# Patient Record
Sex: MALE | Race: WHITE | NOT HISPANIC OR LATINO | Employment: OTHER | URBAN - METROPOLITAN AREA
[De-identification: names, ages, dates, MRNs, and addresses within clinical notes are randomized per-mention and may not be internally consistent; named-entity substitution may affect disease eponyms.]

---

## 2017-06-29 ENCOUNTER — HOSPITAL ENCOUNTER (INPATIENT)
Facility: HOSPITAL | Age: 52
LOS: 4 days | Discharge: HOME | DRG: 871 | End: 2017-07-03
Attending: EMERGENCY MEDICINE | Admitting: HOSPITALIST
Payer: MEDICARE

## 2017-06-29 ENCOUNTER — GENERIC CONVERSION - ENCOUNTER (OUTPATIENT)
Dept: OTHER | Facility: OTHER | Age: 52
End: 2017-06-29

## 2017-06-29 ENCOUNTER — APPOINTMENT (EMERGENCY)
Dept: RADIOLOGY | Facility: HOSPITAL | Age: 52
DRG: 871 | End: 2017-06-29
Payer: MEDICARE

## 2017-06-29 DIAGNOSIS — N39.0 URINARY TRACT INFECTION, ACUTE: Primary | ICD-10-CM

## 2017-06-29 DIAGNOSIS — E66.01 MORBID OBESITY DUE TO EXCESS CALORIES (HCC): Chronic | ICD-10-CM

## 2017-06-29 DIAGNOSIS — A41.9 SEPSIS (HCC): ICD-10-CM

## 2017-06-29 DIAGNOSIS — I50.9 CHF, ACUTE (HCC): ICD-10-CM

## 2017-06-29 DIAGNOSIS — I50.43 ACUTE ON CHRONIC COMBINED SYSTOLIC AND DIASTOLIC CONGESTIVE HEART FAILURE (HCC): Chronic | ICD-10-CM

## 2017-06-29 PROBLEM — I10 ESSENTIAL HYPERTENSION: Chronic | Status: ACTIVE | Noted: 2017-06-29

## 2017-06-29 PROBLEM — R65.10 SIRS (SYSTEMIC INFLAMMATORY RESPONSE SYNDROME) (HCC): Status: ACTIVE | Noted: 2017-06-29

## 2017-06-29 PROBLEM — N17.9 AKI (ACUTE KIDNEY INJURY) (HCC): Status: ACTIVE | Noted: 2017-06-29

## 2017-06-29 LAB
ALBUMIN SERPL BCP-MCNC: 3.4 G/DL (ref 3.5–5)
ALP SERPL-CCNC: 80 U/L (ref 46–116)
ALT SERPL W P-5'-P-CCNC: 49 U/L (ref 12–78)
ANION GAP SERPL CALCULATED.3IONS-SCNC: 8 MMOL/L (ref 4–13)
AST SERPL W P-5'-P-CCNC: 27 U/L (ref 5–45)
BACTERIA UR QL AUTO: ABNORMAL /HPF
BASOPHILS # BLD AUTO: 0.07 THOUSANDS/ΜL (ref 0–0.1)
BASOPHILS NFR BLD AUTO: 1 % (ref 0–1)
BILIRUB SERPL-MCNC: 0.8 MG/DL (ref 0.2–1)
BILIRUB UR QL STRIP: NEGATIVE
BUN SERPL-MCNC: 15 MG/DL (ref 5–25)
CALCIUM SERPL-MCNC: 9.4 MG/DL (ref 8.3–10.1)
CHLORIDE SERPL-SCNC: 99 MMOL/L (ref 100–108)
CLARITY UR: CLEAR
CO2 SERPL-SCNC: 31 MMOL/L (ref 21–32)
COLOR UR: YELLOW
CREAT SERPL-MCNC: 1.41 MG/DL (ref 0.6–1.3)
EOSINOPHIL # BLD AUTO: 0.14 THOUSAND/ΜL (ref 0–0.61)
EOSINOPHIL NFR BLD AUTO: 1 % (ref 0–6)
ERYTHROCYTE [DISTWIDTH] IN BLOOD BY AUTOMATED COUNT: 13.6 % (ref 11.6–15.1)
GFR SERPL CREATININE-BSD FRML MDRD: 53 ML/MIN/1.73SQ M
GLUCOSE SERPL-MCNC: 102 MG/DL (ref 65–140)
GLUCOSE UR STRIP-MCNC: NEGATIVE MG/DL
HCT VFR BLD AUTO: 46.2 % (ref 36.5–49.3)
HGB BLD-MCNC: 15.3 G/DL (ref 12–17)
HGB UR QL STRIP.AUTO: ABNORMAL
KETONES UR STRIP-MCNC: NEGATIVE MG/DL
LACTATE SERPL-SCNC: 1.5 MMOL/L (ref 0.5–2)
LEUKOCYTE ESTERASE UR QL STRIP: ABNORMAL
LYMPHOCYTES # BLD AUTO: 1.82 THOUSANDS/ΜL (ref 0.6–4.47)
LYMPHOCYTES NFR BLD AUTO: 14 % (ref 14–44)
MCH RBC QN AUTO: 28.7 PG (ref 26.8–34.3)
MCHC RBC AUTO-ENTMCNC: 33.1 G/DL (ref 31.4–37.4)
MCV RBC AUTO: 87 FL (ref 82–98)
MONOCYTES # BLD AUTO: 1.1 THOUSAND/ΜL (ref 0.17–1.22)
MONOCYTES NFR BLD AUTO: 8 % (ref 4–12)
NEUTROPHILS # BLD AUTO: 10.37 THOUSANDS/ΜL (ref 1.85–7.62)
NEUTS SEG NFR BLD AUTO: 76 % (ref 43–75)
NITRITE UR QL STRIP: NEGATIVE
NON-SQ EPI CELLS URNS QL MICRO: ABNORMAL /HPF
NT-PROBNP SERPL-MCNC: 1748 PG/ML
PH UR STRIP.AUTO: 6 [PH] (ref 4.5–8)
PLATELET # BLD AUTO: 209 THOUSANDS/UL (ref 149–390)
PLATELET # BLD AUTO: 211 THOUSANDS/UL (ref 149–390)
PMV BLD AUTO: 11 FL (ref 8.9–12.7)
PMV BLD AUTO: 11.1 FL (ref 8.9–12.7)
POTASSIUM SERPL-SCNC: 3.9 MMOL/L (ref 3.5–5.3)
PROT SERPL-MCNC: 8.5 G/DL (ref 6.4–8.2)
PROT UR STRIP-MCNC: ABNORMAL MG/DL
RBC # BLD AUTO: 5.33 MILLION/UL (ref 3.88–5.62)
RBC #/AREA URNS AUTO: ABNORMAL /HPF
SODIUM SERPL-SCNC: 138 MMOL/L (ref 136–145)
SP GR UR STRIP.AUTO: 1.02 (ref 1–1.03)
TROPONIN I SERPL-MCNC: 0.06 NG/ML
UROBILINOGEN UR QL STRIP.AUTO: 0.2 E.U./DL
WBC # BLD AUTO: 13.5 THOUSAND/UL (ref 4.31–10.16)
WBC #/AREA URNS AUTO: ABNORMAL /HPF

## 2017-06-29 PROCEDURE — 94660 CPAP INITIATION&MGMT: CPT

## 2017-06-29 PROCEDURE — 85025 COMPLETE CBC W/AUTO DIFF WBC: CPT | Performed by: EMERGENCY MEDICINE

## 2017-06-29 PROCEDURE — 83605 ASSAY OF LACTIC ACID: CPT | Performed by: EMERGENCY MEDICINE

## 2017-06-29 PROCEDURE — 36415 COLL VENOUS BLD VENIPUNCTURE: CPT | Performed by: EMERGENCY MEDICINE

## 2017-06-29 PROCEDURE — 71020 HB CHEST X-RAY 2VW FRONTAL&LATL: CPT

## 2017-06-29 PROCEDURE — 99285 EMERGENCY DEPT VISIT HI MDM: CPT

## 2017-06-29 PROCEDURE — 80053 COMPREHEN METABOLIC PANEL: CPT | Performed by: EMERGENCY MEDICINE

## 2017-06-29 PROCEDURE — 87186 SC STD MICRODIL/AGAR DIL: CPT | Performed by: EMERGENCY MEDICINE

## 2017-06-29 PROCEDURE — 94760 N-INVAS EAR/PLS OXIMETRY 1: CPT

## 2017-06-29 PROCEDURE — 85049 AUTOMATED PLATELET COUNT: CPT | Performed by: PHYSICIAN ASSISTANT

## 2017-06-29 PROCEDURE — 84484 ASSAY OF TROPONIN QUANT: CPT | Performed by: EMERGENCY MEDICINE

## 2017-06-29 PROCEDURE — 93005 ELECTROCARDIOGRAM TRACING: CPT

## 2017-06-29 PROCEDURE — 87086 URINE CULTURE/COLONY COUNT: CPT | Performed by: EMERGENCY MEDICINE

## 2017-06-29 PROCEDURE — 83880 ASSAY OF NATRIURETIC PEPTIDE: CPT | Performed by: EMERGENCY MEDICINE

## 2017-06-29 PROCEDURE — 81001 URINALYSIS AUTO W/SCOPE: CPT | Performed by: EMERGENCY MEDICINE

## 2017-06-29 PROCEDURE — 84484 ASSAY OF TROPONIN QUANT: CPT | Performed by: PHYSICIAN ASSISTANT

## 2017-06-29 PROCEDURE — 87040 BLOOD CULTURE FOR BACTERIA: CPT | Performed by: EMERGENCY MEDICINE

## 2017-06-29 PROCEDURE — 87077 CULTURE AEROBIC IDENTIFY: CPT | Performed by: EMERGENCY MEDICINE

## 2017-06-29 RX ORDER — ALBUTEROL SULFATE 2.5 MG/3ML
2.5 SOLUTION RESPIRATORY (INHALATION) EVERY 4 HOURS PRN
Status: DISCONTINUED | OUTPATIENT
Start: 2017-06-29 | End: 2017-07-03 | Stop reason: HOSPADM

## 2017-06-29 RX ORDER — ACETAMINOPHEN 325 MG/1
650 TABLET ORAL ONCE
Status: COMPLETED | OUTPATIENT
Start: 2017-06-29 | End: 2017-06-29

## 2017-06-29 RX ORDER — ONDANSETRON 2 MG/ML
4 INJECTION INTRAMUSCULAR; INTRAVENOUS EVERY 6 HOURS PRN
Status: DISCONTINUED | OUTPATIENT
Start: 2017-06-29 | End: 2017-07-03 | Stop reason: HOSPADM

## 2017-06-29 RX ORDER — HEPARIN SODIUM 5000 [USP'U]/ML
7500 INJECTION, SOLUTION INTRAVENOUS; SUBCUTANEOUS EVERY 8 HOURS SCHEDULED
Status: DISCONTINUED | OUTPATIENT
Start: 2017-06-29 | End: 2017-07-03 | Stop reason: HOSPADM

## 2017-06-29 RX ORDER — LEVOFLOXACIN 5 MG/ML
750 INJECTION, SOLUTION INTRAVENOUS ONCE
Status: COMPLETED | OUTPATIENT
Start: 2017-06-29 | End: 2017-06-29

## 2017-06-29 RX ORDER — FUROSEMIDE 10 MG/ML
40 INJECTION INTRAMUSCULAR; INTRAVENOUS
Status: DISCONTINUED | OUTPATIENT
Start: 2017-06-29 | End: 2017-07-02

## 2017-06-29 RX ORDER — ASPIRIN 81 MG/1
81 TABLET, CHEWABLE ORAL DAILY
Status: DISCONTINUED | OUTPATIENT
Start: 2017-06-30 | End: 2017-07-03 | Stop reason: HOSPADM

## 2017-06-29 RX ADMIN — ONDANSETRON 4 MG: 2 INJECTION INTRAMUSCULAR; INTRAVENOUS at 21:17

## 2017-06-29 RX ADMIN — METOPROLOL TARTRATE 12.5 MG: 25 TABLET ORAL at 20:33

## 2017-06-29 RX ADMIN — LEVOFLOXACIN 750 MG: 5 INJECTION, SOLUTION INTRAVENOUS at 15:21

## 2017-06-29 RX ADMIN — HEPARIN SODIUM 7500 UNITS: 5000 INJECTION, SOLUTION INTRAVENOUS; SUBCUTANEOUS at 18:20

## 2017-06-29 RX ADMIN — HEPARIN SODIUM 7500 UNITS: 5000 INJECTION, SOLUTION INTRAVENOUS; SUBCUTANEOUS at 21:17

## 2017-06-29 RX ADMIN — CEFEPIME HYDROCHLORIDE 2000 MG: 2 INJECTION, POWDER, FOR SOLUTION INTRAVENOUS at 20:34

## 2017-06-29 RX ADMIN — ACETAMINOPHEN 650 MG: 325 TABLET ORAL at 15:29

## 2017-06-29 RX ADMIN — FUROSEMIDE 40 MG: 10 INJECTION, SOLUTION INTRAMUSCULAR; INTRAVENOUS at 18:20

## 2017-06-30 ENCOUNTER — APPOINTMENT (INPATIENT)
Dept: NON INVASIVE DIAGNOSTICS | Facility: HOSPITAL | Age: 52
DRG: 871 | End: 2017-06-30
Payer: MEDICARE

## 2017-06-30 LAB
ANION GAP SERPL CALCULATED.3IONS-SCNC: 10 MMOL/L (ref 4–13)
ATRIAL RATE: 117 BPM
BASOPHILS # BLD AUTO: 0.05 THOUSANDS/ΜL (ref 0–0.1)
BASOPHILS NFR BLD AUTO: 1 % (ref 0–1)
BUN SERPL-MCNC: 18 MG/DL (ref 5–25)
CALCIUM SERPL-MCNC: 8.9 MG/DL (ref 8.3–10.1)
CHLORIDE SERPL-SCNC: 102 MMOL/L (ref 100–108)
CHOLEST SERPL-MCNC: 163 MG/DL (ref 50–200)
CO2 SERPL-SCNC: 26 MMOL/L (ref 21–32)
CREAT SERPL-MCNC: 1.47 MG/DL (ref 0.6–1.3)
EOSINOPHIL # BLD AUTO: 0.2 THOUSAND/ΜL (ref 0–0.61)
EOSINOPHIL NFR BLD AUTO: 2 % (ref 0–6)
ERYTHROCYTE [DISTWIDTH] IN BLOOD BY AUTOMATED COUNT: 13.8 % (ref 11.6–15.1)
EST. AVERAGE GLUCOSE BLD GHB EST-MCNC: 105 MG/DL
GFR SERPL CREATININE-BSD FRML MDRD: 50.5 ML/MIN/1.73SQ M
GLUCOSE SERPL-MCNC: 101 MG/DL (ref 65–140)
HBA1C MFR BLD: 5.3 % (ref 4.2–6.3)
HCT VFR BLD AUTO: 46.1 % (ref 36.5–49.3)
HDLC SERPL-MCNC: 43 MG/DL (ref 40–60)
HGB BLD-MCNC: 14.9 G/DL (ref 12–17)
LDLC SERPL CALC-MCNC: 103 MG/DL (ref 0–100)
LYMPHOCYTES # BLD AUTO: 1.56 THOUSANDS/ΜL (ref 0.6–4.47)
LYMPHOCYTES NFR BLD AUTO: 15 % (ref 14–44)
MCH RBC QN AUTO: 28.2 PG (ref 26.8–34.3)
MCHC RBC AUTO-ENTMCNC: 32.3 G/DL (ref 31.4–37.4)
MCV RBC AUTO: 87 FL (ref 82–98)
MONOCYTES # BLD AUTO: 0.93 THOUSAND/ΜL (ref 0.17–1.22)
MONOCYTES NFR BLD AUTO: 9 % (ref 4–12)
NEUTROPHILS # BLD AUTO: 7.72 THOUSANDS/ΜL (ref 1.85–7.62)
NEUTS SEG NFR BLD AUTO: 73 % (ref 43–75)
P AXIS: 43 DEGREES
PLATELET # BLD AUTO: 233 THOUSANDS/UL (ref 149–390)
PMV BLD AUTO: 11 FL (ref 8.9–12.7)
POTASSIUM SERPL-SCNC: 3.8 MMOL/L (ref 3.5–5.3)
PR INTERVAL: 162 MS
QRS AXIS: -6 DEGREES
QRSD INTERVAL: 106 MS
QT INTERVAL: 322 MS
QTC INTERVAL: 449 MS
RBC # BLD AUTO: 5.29 MILLION/UL (ref 3.88–5.62)
SODIUM SERPL-SCNC: 138 MMOL/L (ref 136–145)
T WAVE AXIS: 91 DEGREES
TRIGL SERPL-MCNC: 87 MG/DL
TROPONIN I SERPL-MCNC: 0.05 NG/ML
TSH SERPL DL<=0.05 MIU/L-ACNC: 0.84 UIU/ML (ref 0.36–3.74)
VENTRICULAR RATE: 117 BPM
WBC # BLD AUTO: 10.46 THOUSAND/UL (ref 4.31–10.16)

## 2017-06-30 PROCEDURE — 84443 ASSAY THYROID STIM HORMONE: CPT | Performed by: PHYSICIAN ASSISTANT

## 2017-06-30 PROCEDURE — C8929 TTE W OR WO FOL WCON,DOPPLER: HCPCS

## 2017-06-30 PROCEDURE — 80061 LIPID PANEL: CPT | Performed by: PHYSICIAN ASSISTANT

## 2017-06-30 PROCEDURE — 94660 CPAP INITIATION&MGMT: CPT

## 2017-06-30 PROCEDURE — 85025 COMPLETE CBC W/AUTO DIFF WBC: CPT | Performed by: PHYSICIAN ASSISTANT

## 2017-06-30 PROCEDURE — 83036 HEMOGLOBIN GLYCOSYLATED A1C: CPT | Performed by: PHYSICIAN ASSISTANT

## 2017-06-30 PROCEDURE — 80048 BASIC METABOLIC PNL TOTAL CA: CPT | Performed by: PHYSICIAN ASSISTANT

## 2017-06-30 PROCEDURE — 84484 ASSAY OF TROPONIN QUANT: CPT | Performed by: PHYSICIAN ASSISTANT

## 2017-06-30 RX ORDER — CARVEDILOL 6.25 MG/1
6.25 TABLET ORAL 2 TIMES DAILY WITH MEALS
Status: DISCONTINUED | OUTPATIENT
Start: 2017-06-30 | End: 2017-07-03 | Stop reason: HOSPADM

## 2017-06-30 RX ADMIN — PERFLUTREN 1.2 ML/MIN: 6.52 INJECTION, SUSPENSION INTRAVENOUS at 12:32

## 2017-06-30 RX ADMIN — ASPIRIN 81 MG 81 MG: 81 TABLET ORAL at 08:11

## 2017-06-30 RX ADMIN — HEPARIN SODIUM 7500 UNITS: 5000 INJECTION, SOLUTION INTRAVENOUS; SUBCUTANEOUS at 05:05

## 2017-06-30 RX ADMIN — FUROSEMIDE 40 MG: 10 INJECTION, SOLUTION INTRAMUSCULAR; INTRAVENOUS at 08:11

## 2017-06-30 RX ADMIN — FUROSEMIDE 40 MG: 10 INJECTION, SOLUTION INTRAMUSCULAR; INTRAVENOUS at 15:33

## 2017-06-30 RX ADMIN — CEFTRIAXONE SODIUM 1000 MG: 10 INJECTION, POWDER, FOR SOLUTION INTRAVENOUS at 23:12

## 2017-06-30 RX ADMIN — HEPARIN SODIUM 7500 UNITS: 5000 INJECTION, SOLUTION INTRAVENOUS; SUBCUTANEOUS at 15:24

## 2017-06-30 RX ADMIN — METOPROLOL TARTRATE 12.5 MG: 25 TABLET ORAL at 08:11

## 2017-06-30 RX ADMIN — HEPARIN SODIUM 7500 UNITS: 5000 INJECTION, SOLUTION INTRAVENOUS; SUBCUTANEOUS at 23:09

## 2017-06-30 RX ADMIN — CEFTRIAXONE SODIUM 1000 MG: 10 INJECTION, POWDER, FOR SOLUTION INTRAVENOUS at 00:01

## 2017-06-30 RX ADMIN — CARVEDILOL 6.25 MG: 6.25 TABLET, FILM COATED ORAL at 15:33

## 2017-07-01 ENCOUNTER — APPOINTMENT (INPATIENT)
Dept: ULTRASOUND IMAGING | Facility: HOSPITAL | Age: 52
DRG: 871 | End: 2017-07-01
Payer: MEDICARE

## 2017-07-01 PROBLEM — A41.9 SEPSIS (HCC): Status: ACTIVE | Noted: 2017-06-29

## 2017-07-01 LAB
ANION GAP SERPL CALCULATED.3IONS-SCNC: 10 MMOL/L (ref 4–13)
BACTERIA UR CULT: NORMAL
BASOPHILS # BLD AUTO: 0.06 THOUSANDS/ΜL (ref 0–0.1)
BASOPHILS NFR BLD AUTO: 1 % (ref 0–1)
BUN SERPL-MCNC: 27 MG/DL (ref 5–25)
CALCIUM SERPL-MCNC: 9.6 MG/DL (ref 8.3–10.1)
CHLORIDE SERPL-SCNC: 101 MMOL/L (ref 100–108)
CO2 SERPL-SCNC: 28 MMOL/L (ref 21–32)
CREAT SERPL-MCNC: 1.6 MG/DL (ref 0.6–1.3)
EOSINOPHIL # BLD AUTO: 0.33 THOUSAND/ΜL (ref 0–0.61)
EOSINOPHIL NFR BLD AUTO: 3 % (ref 0–6)
ERYTHROCYTE [DISTWIDTH] IN BLOOD BY AUTOMATED COUNT: 14.1 % (ref 11.6–15.1)
GFR SERPL CREATININE-BSD FRML MDRD: 45.8 ML/MIN/1.73SQ M
GLUCOSE SERPL-MCNC: 104 MG/DL (ref 65–140)
HCT VFR BLD AUTO: 46.7 % (ref 36.5–49.3)
HGB BLD-MCNC: 14.9 G/DL (ref 12–17)
LYMPHOCYTES # BLD AUTO: 1.4 THOUSANDS/ΜL (ref 0.6–4.47)
LYMPHOCYTES NFR BLD AUTO: 13 % (ref 14–44)
MCH RBC QN AUTO: 28.4 PG (ref 26.8–34.3)
MCHC RBC AUTO-ENTMCNC: 31.9 G/DL (ref 31.4–37.4)
MCV RBC AUTO: 89 FL (ref 82–98)
MONOCYTES # BLD AUTO: 1.06 THOUSAND/ΜL (ref 0.17–1.22)
MONOCYTES NFR BLD AUTO: 10 % (ref 4–12)
NEUTROPHILS # BLD AUTO: 8.17 THOUSANDS/ΜL (ref 1.85–7.62)
NEUTS SEG NFR BLD AUTO: 73 % (ref 43–75)
PLATELET # BLD AUTO: 204 THOUSANDS/UL (ref 149–390)
PMV BLD AUTO: 10.7 FL (ref 8.9–12.7)
POTASSIUM SERPL-SCNC: 3.6 MMOL/L (ref 3.5–5.3)
RBC # BLD AUTO: 5.25 MILLION/UL (ref 3.88–5.62)
SODIUM SERPL-SCNC: 139 MMOL/L (ref 136–145)
WBC # BLD AUTO: 11.02 THOUSAND/UL (ref 4.31–10.16)

## 2017-07-01 PROCEDURE — 76770 US EXAM ABDO BACK WALL COMP: CPT

## 2017-07-01 PROCEDURE — 85025 COMPLETE CBC W/AUTO DIFF WBC: CPT | Performed by: PHYSICIAN ASSISTANT

## 2017-07-01 PROCEDURE — 80048 BASIC METABOLIC PNL TOTAL CA: CPT | Performed by: PHYSICIAN ASSISTANT

## 2017-07-01 RX ORDER — ACETAMINOPHEN 325 MG/1
650 TABLET ORAL EVERY 6 HOURS PRN
Status: DISCONTINUED | OUTPATIENT
Start: 2017-07-01 | End: 2017-07-03 | Stop reason: HOSPADM

## 2017-07-01 RX ORDER — POTASSIUM CHLORIDE 20 MEQ/1
40 TABLET, EXTENDED RELEASE ORAL ONCE
Status: COMPLETED | OUTPATIENT
Start: 2017-07-01 | End: 2017-07-01

## 2017-07-01 RX ORDER — HYDRALAZINE HYDROCHLORIDE 25 MG/1
25 TABLET, FILM COATED ORAL EVERY 8 HOURS SCHEDULED
Status: DISCONTINUED | OUTPATIENT
Start: 2017-07-01 | End: 2017-07-03 | Stop reason: HOSPADM

## 2017-07-01 RX ADMIN — HEPARIN SODIUM 7500 UNITS: 5000 INJECTION, SOLUTION INTRAVENOUS; SUBCUTANEOUS at 21:44

## 2017-07-01 RX ADMIN — ASPIRIN 81 MG 81 MG: 81 TABLET ORAL at 08:36

## 2017-07-01 RX ADMIN — FUROSEMIDE 40 MG: 10 INJECTION, SOLUTION INTRAMUSCULAR; INTRAVENOUS at 08:36

## 2017-07-01 RX ADMIN — CEFTRIAXONE SODIUM 1000 MG: 10 INJECTION, POWDER, FOR SOLUTION INTRAVENOUS at 23:08

## 2017-07-01 RX ADMIN — CARVEDILOL 6.25 MG: 6.25 TABLET, FILM COATED ORAL at 08:36

## 2017-07-01 RX ADMIN — FUROSEMIDE 40 MG: 10 INJECTION, SOLUTION INTRAMUSCULAR; INTRAVENOUS at 16:20

## 2017-07-01 RX ADMIN — HEPARIN SODIUM 7500 UNITS: 5000 INJECTION, SOLUTION INTRAVENOUS; SUBCUTANEOUS at 13:58

## 2017-07-01 RX ADMIN — CARVEDILOL 6.25 MG: 6.25 TABLET, FILM COATED ORAL at 16:20

## 2017-07-01 RX ADMIN — HYDRALAZINE HYDROCHLORIDE 25 MG: 25 TABLET, FILM COATED ORAL at 16:20

## 2017-07-01 RX ADMIN — HYDRALAZINE HYDROCHLORIDE 25 MG: 25 TABLET, FILM COATED ORAL at 21:42

## 2017-07-01 RX ADMIN — HEPARIN SODIUM 7500 UNITS: 5000 INJECTION, SOLUTION INTRAVENOUS; SUBCUTANEOUS at 05:39

## 2017-07-01 RX ADMIN — POTASSIUM CHLORIDE 40 MEQ: 1500 TABLET, EXTENDED RELEASE ORAL at 10:57

## 2017-07-01 RX ADMIN — HYDRALAZINE HYDROCHLORIDE 25 MG: 25 TABLET, FILM COATED ORAL at 10:57

## 2017-07-02 LAB
ANION GAP SERPL CALCULATED.3IONS-SCNC: 11 MMOL/L (ref 4–13)
BASOPHILS # BLD AUTO: 0.09 THOUSANDS/ΜL (ref 0–0.1)
BASOPHILS NFR BLD AUTO: 1 % (ref 0–1)
BUN SERPL-MCNC: 30 MG/DL (ref 5–25)
CALCIUM SERPL-MCNC: 9.2 MG/DL (ref 8.3–10.1)
CHLORIDE SERPL-SCNC: 102 MMOL/L (ref 100–108)
CO2 SERPL-SCNC: 28 MMOL/L (ref 21–32)
CREAT SERPL-MCNC: 1.49 MG/DL (ref 0.6–1.3)
EOSINOPHIL # BLD AUTO: 0.58 THOUSAND/ΜL (ref 0–0.61)
EOSINOPHIL NFR BLD AUTO: 7 % (ref 0–6)
ERYTHROCYTE [DISTWIDTH] IN BLOOD BY AUTOMATED COUNT: 14 % (ref 11.6–15.1)
GFR SERPL CREATININE-BSD FRML MDRD: 49.7 ML/MIN/1.73SQ M
GLUCOSE SERPL-MCNC: 109 MG/DL (ref 65–140)
HCT VFR BLD AUTO: 46.4 % (ref 36.5–49.3)
HGB BLD-MCNC: 14.8 G/DL (ref 12–17)
LYMPHOCYTES # BLD AUTO: 1.82 THOUSANDS/ΜL (ref 0.6–4.47)
LYMPHOCYTES NFR BLD AUTO: 23 % (ref 14–44)
MAGNESIUM SERPL-MCNC: 2.4 MG/DL (ref 1.6–2.6)
MCH RBC QN AUTO: 28.2 PG (ref 26.8–34.3)
MCHC RBC AUTO-ENTMCNC: 31.9 G/DL (ref 31.4–37.4)
MCV RBC AUTO: 88 FL (ref 82–98)
MONOCYTES # BLD AUTO: 1.01 THOUSAND/ΜL (ref 0.17–1.22)
MONOCYTES NFR BLD AUTO: 13 % (ref 4–12)
NEUTROPHILS # BLD AUTO: 4.38 THOUSANDS/ΜL (ref 1.85–7.62)
NEUTS SEG NFR BLD AUTO: 56 % (ref 43–75)
PLATELET # BLD AUTO: 238 THOUSANDS/UL (ref 149–390)
PMV BLD AUTO: 10.9 FL (ref 8.9–12.7)
POTASSIUM SERPL-SCNC: 3.8 MMOL/L (ref 3.5–5.3)
RBC # BLD AUTO: 5.25 MILLION/UL (ref 3.88–5.62)
SODIUM SERPL-SCNC: 141 MMOL/L (ref 136–145)
WBC # BLD AUTO: 7.88 THOUSAND/UL (ref 4.31–10.16)

## 2017-07-02 PROCEDURE — 85025 COMPLETE CBC W/AUTO DIFF WBC: CPT | Performed by: PHYSICIAN ASSISTANT

## 2017-07-02 PROCEDURE — 83735 ASSAY OF MAGNESIUM: CPT | Performed by: PHYSICIAN ASSISTANT

## 2017-07-02 PROCEDURE — 80048 BASIC METABOLIC PNL TOTAL CA: CPT | Performed by: PHYSICIAN ASSISTANT

## 2017-07-02 RX ORDER — ISOSORBIDE MONONITRATE 30 MG/1
30 TABLET, EXTENDED RELEASE ORAL DAILY
Status: DISCONTINUED | OUTPATIENT
Start: 2017-07-02 | End: 2017-07-03 | Stop reason: HOSPADM

## 2017-07-02 RX ORDER — MINERAL OIL AND PETROLATUM 150; 830 MG/G; MG/G
OINTMENT OPHTHALMIC
Status: DISCONTINUED | OUTPATIENT
Start: 2017-07-02 | End: 2017-07-02

## 2017-07-02 RX ORDER — LEVOFLOXACIN 500 MG/1
500 TABLET, FILM COATED ORAL EVERY 24 HOURS
Status: DISCONTINUED | OUTPATIENT
Start: 2017-07-02 | End: 2017-07-03 | Stop reason: HOSPADM

## 2017-07-02 RX ORDER — FUROSEMIDE 40 MG/1
40 TABLET ORAL
Status: DISCONTINUED | OUTPATIENT
Start: 2017-07-02 | End: 2017-07-03 | Stop reason: HOSPADM

## 2017-07-02 RX ORDER — POLYVINYL ALCOHOL 14 MG/ML
1 SOLUTION/ DROPS OPHTHALMIC
Status: DISCONTINUED | OUTPATIENT
Start: 2017-07-02 | End: 2017-07-03 | Stop reason: HOSPADM

## 2017-07-02 RX ADMIN — LEVOFLOXACIN 500 MG: 500 TABLET, FILM COATED ORAL at 09:21

## 2017-07-02 RX ADMIN — HYDRALAZINE HYDROCHLORIDE 25 MG: 25 TABLET, FILM COATED ORAL at 21:39

## 2017-07-02 RX ADMIN — ACETAMINOPHEN 650 MG: 325 TABLET, FILM COATED ORAL at 14:41

## 2017-07-02 RX ADMIN — HEPARIN SODIUM 7500 UNITS: 5000 INJECTION, SOLUTION INTRAVENOUS; SUBCUTANEOUS at 21:39

## 2017-07-02 RX ADMIN — HEPARIN SODIUM 7500 UNITS: 5000 INJECTION, SOLUTION INTRAVENOUS; SUBCUTANEOUS at 05:10

## 2017-07-02 RX ADMIN — FUROSEMIDE 40 MG: 40 TABLET ORAL at 16:39

## 2017-07-02 RX ADMIN — CARVEDILOL 6.25 MG: 6.25 TABLET, FILM COATED ORAL at 09:21

## 2017-07-02 RX ADMIN — FUROSEMIDE 40 MG: 10 INJECTION, SOLUTION INTRAMUSCULAR; INTRAVENOUS at 09:21

## 2017-07-02 RX ADMIN — POLYVINYL ALCOHOL 1 DROP: 14 SOLUTION/ DROPS OPHTHALMIC at 14:39

## 2017-07-02 RX ADMIN — CARVEDILOL 6.25 MG: 6.25 TABLET, FILM COATED ORAL at 16:39

## 2017-07-02 RX ADMIN — HYDRALAZINE HYDROCHLORIDE 25 MG: 25 TABLET, FILM COATED ORAL at 05:11

## 2017-07-02 RX ADMIN — HYDRALAZINE HYDROCHLORIDE 25 MG: 25 TABLET, FILM COATED ORAL at 14:39

## 2017-07-02 RX ADMIN — HEPARIN SODIUM 7500 UNITS: 5000 INJECTION, SOLUTION INTRAVENOUS; SUBCUTANEOUS at 13:02

## 2017-07-02 RX ADMIN — ASPIRIN 81 MG 81 MG: 81 TABLET ORAL at 09:21

## 2017-07-02 RX ADMIN — ISOSORBIDE MONONITRATE 30 MG: 30 TABLET, EXTENDED RELEASE ORAL at 12:57

## 2017-07-03 VITALS
HEART RATE: 87 BPM | DIASTOLIC BLOOD PRESSURE: 67 MMHG | RESPIRATION RATE: 18 BRPM | TEMPERATURE: 98.2 F | OXYGEN SATURATION: 95 % | SYSTOLIC BLOOD PRESSURE: 149 MMHG | WEIGHT: 315 LBS

## 2017-07-03 LAB
ANION GAP SERPL CALCULATED.3IONS-SCNC: 7 MMOL/L (ref 4–13)
BUN SERPL-MCNC: 27 MG/DL (ref 5–25)
CALCIUM SERPL-MCNC: 9.1 MG/DL (ref 8.3–10.1)
CHLORIDE SERPL-SCNC: 104 MMOL/L (ref 100–108)
CO2 SERPL-SCNC: 30 MMOL/L (ref 21–32)
CREAT SERPL-MCNC: 1.39 MG/DL (ref 0.6–1.3)
GFR SERPL CREATININE-BSD FRML MDRD: 53.9 ML/MIN/1.73SQ M
GLUCOSE SERPL-MCNC: 116 MG/DL (ref 65–140)
POTASSIUM SERPL-SCNC: 3.9 MMOL/L (ref 3.5–5.3)
SODIUM SERPL-SCNC: 141 MMOL/L (ref 136–145)

## 2017-07-03 PROCEDURE — 80048 BASIC METABOLIC PNL TOTAL CA: CPT | Performed by: PHYSICIAN ASSISTANT

## 2017-07-03 RX ORDER — FUROSEMIDE 40 MG/1
40 TABLET ORAL
Qty: 60 TABLET | Refills: 0 | Status: SHIPPED | OUTPATIENT
Start: 2017-07-03 | End: 2018-07-14 | Stop reason: SDUPTHER

## 2017-07-03 RX ORDER — ISOSORBIDE MONONITRATE 30 MG/1
30 TABLET, EXTENDED RELEASE ORAL DAILY
Qty: 30 TABLET | Refills: 0 | Status: SHIPPED | OUTPATIENT
Start: 2017-07-03 | End: 2018-07-14 | Stop reason: SDUPTHER

## 2017-07-03 RX ORDER — CARVEDILOL 6.25 MG/1
6.25 TABLET ORAL 2 TIMES DAILY WITH MEALS
Qty: 60 TABLET | Refills: 0 | Status: SHIPPED | OUTPATIENT
Start: 2017-07-03 | End: 2018-03-16 | Stop reason: SDUPTHER

## 2017-07-03 RX ORDER — POTASSIUM CHLORIDE 20 MEQ/1
20 TABLET, EXTENDED RELEASE ORAL DAILY
Qty: 30 TABLET | Refills: 0 | Status: CANCELLED | OUTPATIENT
Start: 2017-07-03 | End: 2017-08-02

## 2017-07-03 RX ORDER — ASPIRIN 81 MG/1
81 TABLET, CHEWABLE ORAL DAILY
Refills: 0 | Status: SHIPPED | OUTPATIENT
Start: 2017-07-03

## 2017-07-03 RX ORDER — POTASSIUM CHLORIDE 750 MG/1
20 TABLET, FILM COATED, EXTENDED RELEASE ORAL DAILY
Qty: 30 TABLET | Refills: 0 | Status: SHIPPED | OUTPATIENT
Start: 2017-07-03 | End: 2018-02-02 | Stop reason: SDUPTHER

## 2017-07-03 RX ORDER — LEVOFLOXACIN 500 MG/1
500 TABLET, FILM COATED ORAL EVERY 24 HOURS
Qty: 1 TABLET | Refills: 0 | Status: SHIPPED | OUTPATIENT
Start: 2017-07-03 | End: 2017-07-04

## 2017-07-03 RX ORDER — POTASSIUM CHLORIDE 20 MEQ/1
20 TABLET, EXTENDED RELEASE ORAL ONCE
Status: COMPLETED | OUTPATIENT
Start: 2017-07-03 | End: 2017-07-03

## 2017-07-03 RX ORDER — HYDRALAZINE HYDROCHLORIDE 25 MG/1
25 TABLET, FILM COATED ORAL EVERY 8 HOURS SCHEDULED
Qty: 90 TABLET | Refills: 0 | Status: SHIPPED | OUTPATIENT
Start: 2017-07-03 | End: 2019-01-14 | Stop reason: SDUPTHER

## 2017-07-03 RX ADMIN — HYDRALAZINE HYDROCHLORIDE 25 MG: 25 TABLET, FILM COATED ORAL at 06:05

## 2017-07-03 RX ADMIN — LEVOFLOXACIN 500 MG: 500 TABLET, FILM COATED ORAL at 08:42

## 2017-07-03 RX ADMIN — POTASSIUM CHLORIDE 20 MEQ: 1500 TABLET, EXTENDED RELEASE ORAL at 10:18

## 2017-07-03 RX ADMIN — ASPIRIN 81 MG 81 MG: 81 TABLET ORAL at 08:42

## 2017-07-03 RX ADMIN — HEPARIN SODIUM 7500 UNITS: 5000 INJECTION, SOLUTION INTRAVENOUS; SUBCUTANEOUS at 06:05

## 2017-07-03 RX ADMIN — CARVEDILOL 6.25 MG: 6.25 TABLET, FILM COATED ORAL at 08:42

## 2017-07-03 RX ADMIN — FUROSEMIDE 40 MG: 40 TABLET ORAL at 08:42

## 2017-07-03 RX ADMIN — ISOSORBIDE MONONITRATE 30 MG: 30 TABLET, EXTENDED RELEASE ORAL at 08:42

## 2017-07-04 LAB
BACTERIA BLD CULT: NORMAL
BACTERIA BLD CULT: NORMAL

## 2017-07-07 ENCOUNTER — TRANSCRIBE ORDERS (OUTPATIENT)
Dept: SLEEP CENTER | Facility: CLINIC | Age: 52
End: 2017-07-07

## 2017-07-07 DIAGNOSIS — G47.33 OSA (OBSTRUCTIVE SLEEP APNEA): Primary | ICD-10-CM

## 2017-07-17 ENCOUNTER — GENERIC CONVERSION - ENCOUNTER (OUTPATIENT)
Dept: OTHER | Facility: OTHER | Age: 52
End: 2017-07-17

## 2017-08-02 ENCOUNTER — ALLSCRIPTS OFFICE VISIT (OUTPATIENT)
Dept: OTHER | Facility: OTHER | Age: 52
End: 2017-08-02

## 2017-08-02 DIAGNOSIS — I50.9 HEART FAILURE (HCC): ICD-10-CM

## 2017-08-02 DIAGNOSIS — M19.90 OSTEOARTHRITIS: ICD-10-CM

## 2017-08-08 ENCOUNTER — TRANSCRIBE ORDERS (OUTPATIENT)
Dept: LAB | Facility: CLINIC | Age: 52
End: 2017-08-08

## 2017-08-08 ENCOUNTER — APPOINTMENT (OUTPATIENT)
Dept: LAB | Facility: CLINIC | Age: 52
End: 2017-08-08
Payer: MEDICARE

## 2017-08-08 DIAGNOSIS — M19.90 OSTEOARTHRITIS: ICD-10-CM

## 2017-08-08 DIAGNOSIS — I50.9 HEART FAILURE (HCC): ICD-10-CM

## 2017-08-08 LAB
ANION GAP SERPL CALCULATED.3IONS-SCNC: 9 MMOL/L (ref 4–13)
BUN SERPL-MCNC: 26 MG/DL (ref 5–25)
CALCIUM SERPL-MCNC: 9.2 MG/DL (ref 8.3–10.1)
CHLORIDE SERPL-SCNC: 103 MMOL/L (ref 100–108)
CO2 SERPL-SCNC: 30 MMOL/L (ref 21–32)
CREAT SERPL-MCNC: 1.35 MG/DL (ref 0.6–1.3)
GFR SERPL CREATININE-BSD FRML MDRD: 60 ML/MIN/1.73SQ M
GLUCOSE SERPL-MCNC: 108 MG/DL (ref 65–140)
POTASSIUM SERPL-SCNC: 4.2 MMOL/L (ref 3.5–5.3)
SODIUM SERPL-SCNC: 142 MMOL/L (ref 136–145)

## 2017-08-08 PROCEDURE — 36415 COLL VENOUS BLD VENIPUNCTURE: CPT

## 2017-08-08 PROCEDURE — 80048 BASIC METABOLIC PNL TOTAL CA: CPT

## 2017-08-30 ENCOUNTER — TRANSCRIBE ORDERS (OUTPATIENT)
Dept: SLEEP CENTER | Facility: CLINIC | Age: 52
End: 2017-08-30

## 2017-08-30 ENCOUNTER — HOSPITAL ENCOUNTER (OUTPATIENT)
Dept: SLEEP CENTER | Facility: CLINIC | Age: 52
Discharge: HOME | End: 2017-08-30
Payer: MEDICARE

## 2017-08-30 DIAGNOSIS — G47.33 OSA (OBSTRUCTIVE SLEEP APNEA): ICD-10-CM

## 2017-08-30 DIAGNOSIS — J44.9 OSA AND COPD OVERLAP SYNDROME (HCC): Primary | ICD-10-CM

## 2017-08-30 DIAGNOSIS — G47.33 OSA AND COPD OVERLAP SYNDROME (HCC): Primary | ICD-10-CM

## 2017-09-06 ENCOUNTER — HOSPITAL ENCOUNTER (OUTPATIENT)
Dept: SLEEP CENTER | Facility: CLINIC | Age: 52
Discharge: HOME | End: 2017-09-06
Payer: MEDICARE

## 2017-09-06 ENCOUNTER — TRANSCRIBE ORDERS (OUTPATIENT)
Dept: SLEEP CENTER | Facility: CLINIC | Age: 52
End: 2017-09-06

## 2017-09-06 DIAGNOSIS — G47.33 OSA AND COPD OVERLAP SYNDROME (HCC): ICD-10-CM

## 2017-09-06 DIAGNOSIS — G47.33 OSA AND COPD OVERLAP SYNDROME (HCC): Primary | ICD-10-CM

## 2017-09-06 DIAGNOSIS — J44.9 OSA AND COPD OVERLAP SYNDROME (HCC): Primary | ICD-10-CM

## 2017-09-06 DIAGNOSIS — J44.9 OSA AND COPD OVERLAP SYNDROME (HCC): ICD-10-CM

## 2017-11-02 ENCOUNTER — ALLSCRIPTS OFFICE VISIT (OUTPATIENT)
Dept: OTHER | Facility: OTHER | Age: 52
End: 2017-11-02

## 2017-11-02 DIAGNOSIS — G47.33 OBSTRUCTIVE SLEEP APNEA: ICD-10-CM

## 2017-11-03 NOTE — PROGRESS NOTES
Assessment  Assessed    1  Biventricular heart failure with reduced left ventricular function (428 0) (I50 814)   2  Morbid obesity (278 01) (E66 01)   3  Non-ischemic cardiomyopathy (425 4) (I42 8)   4  Obstructive sleep apnea (327 23) (G47 33)    Plan  Obstructive sleep apnea    · Follow-up visit in 4 Months Evaluation and Treatment  Follow-up  Status: Hold For -  Scheduling  Requested for: [id number]   Ordered; For: Obstructive sleep apnea; Ordered By: Nicole Estela Performed:  Due: 21SCK8845   · ECHO COMPLETE WITH CONTRAST IF INDICATED; Status:Hold For - Scheduling;  Requested RWQ:[id number];    Perform:[facility]; GTQ:[id number]; Ordered;For:Obstructive sleep apnea; Ordered By:Omar Tidwell; Discussion/Summary  Cardiology Discussion Summary Free Text Note Form [facility]:   1  NICM, EF: 35%, Stage Cis down 24 lbs to 380 [facility]- negative6 4 cmcoreg 12 5 mg BID, hydralazine 25 mg TID, Imdur 30 mg daily, kcl 20 meqnoneMorbid Obesity: eating better nowHTN- controlledOSA- awaiting CPAP titration  to check response to therapyto lose weight  Chief Complaint  Chief Complaint Free Text Note Form: 3 month f/u  History of Present Illness  Cardiology HPI Free Text Note Form [facility]: 47 yo male presents for follow up after hospitalization for acute systolic heart failure  He follows with Dr Shant Ceron for BiV heart failure, obesity and KYRIE  He states he did not have medical insurance for 2 5 years and then got Medicare, went to see his family doctor who sent him to ER due to his volume overload  He had not been on medicines  He is down 24 lbs since his admission  He reports he had a cardiac cath back around 2010 which was negative  He was discharged from the hospital on coreg 6 25 mg BID, hydralazine and imdur  His last Cr was 1 39  He was discharged on lasix 40 mg BID  HE is low sodium in his diet  His lost 12 more lbs since his discharge  He is getting cramping        Active Problems  Problems 1  Arthritis (716 90) (M19 90)   2  Biventricular heart failure with reduced left ventricular function (428 0) (I50 814)   3  Congestive heart failure (428 0) (I50 9)   4  Hypertension (401 9) (I10)   5  Morbid obesity (278 01) (E66 01)   6  Non-ischemic cardiomyopathy (425 4) (I42 8)   7  Non-sustained ventricular tachycardia (427 1) (I47 2)   8  Obesity hypoventilation syndrome (278 03) (E66 2)   9  Obstructive sleep apnea (327 23) (G47 33)    Past Medical History  Problems    1  History of Henoch-Schonlein purpura (287 0) (D69 0)    Surgical History  Problems    1  History of Knee Surgery    Family History  Mother    1  Family history of Arthritis   2  Family history of Hypertension  Father    3  Family history of Cancer  Family History    4  Family history of Diabetes    Social History  Problems    · Full-time employment   ·    · Never a smoker   · No alcohol use   · No drug use    Current Meds   1  Aspirin 81 MG TABS; TAKE 1 TABLET DAILY; Therapy: 09NYK0619 to Recorded   2  Carvedilol 12 5 MG Oral Tablet; TAKE 1 TABLET TWICE DAILY  Requested for:   02Aug2017; Last Rx:02Aug2017 Ordered   3  Furosemide 40 MG Oral Tablet; Take 1 tablet twice daily; Therapy: 23HYZ6549 to (Lissa Ordoñez)  Requested for: 02Aug2017; Last   Rx:02Aug2017 Ordered   4  HydrALAZINE HCl - 25 MG Oral Tablet; TAKE 1 TABLET BY MOUTH 3 TIMES DAILY; Therapy: 78KCE4824 to (Lis Craig)  Requested for: 27Oct2017; Last   Rx:27Oct2017 Ordered   5  Isosorbide Mononitrate ER 30 MG Oral Tablet Extended Release 24 Hour; Take 1 tablet   daily  Requested for: 58Utp8406; Last Rx:02Aug2017 Ordered   6  Potassium Chloride ER 20 MEQ Oral Tablet Extended Release; Take 1 tablet daily; Therapy: 73FAA2733 to (Lis Craig)  Requested for: 77OCC0132; Last   Rx:30Oct2017 Ordered    Allergies  Medication    1   Penicillins    Vitals  Vital Signs    Recorded: 66OIK2357 02:25PM   Heart Rate 76, R Radial   Systolic 959, LUE, Sitting Diastolic 70, LUE, Sitting   BP CUFF SIZE Large   Height 5 ft 4 in   Weight 373 lb 2 oz   BMI Calculated 64 05   BSA Calculated 2 55   O2 Saturation 95     Physical Exam    Constitutional   General appearance: No acute distress, well appearing and well nourished  Eyes   Conjunctiva and Sclera examination: Conjunctiva pink, sclera anicteric  Ears, Nose, Mouth, and Throat - Oropharynx: Clear, nares are clear, mucous membranes are moist    Neck   Neck and thyroid: Normal, supple, trachea midline, no thyromegaly  Pulmonary   Respiratory effort: No increased work of breathing or signs of respiratory distress  Auscultation of lungs: Clear to auscultation, no rales, no rhonchi, no wheezing, good air movement  Cardiovascular   Auscultation of heart: Normal rate and rhythm, normal S1 and S2, no murmurs  Carotid pulses: Normal, 2+ bilaterally  Peripheral vascular exam: Normal pulses throughout, no tenderness, erythema or swelling  Pedal pulses: Normal, 2+ bilaterally  Examination of extremities for edema and/or varicosities: Normal     Abdomen   Abdomen: Non-tender and no distention  Liver and spleen: No hepatomegaly or splenomegaly  Musculoskeletal Gait and station: Normal gait  -- Digits and nails: Normal without clubbing or cyanosis  -- Inspection/palpation of joints, bones, and muscles: Normal, ROM normal     Skin - Skin and subcutaneous tissue: Normal without rashes or lesions  Skin is warm and well perfused, normal turgor  Neurologic - Cranial nerves: II - XII intact  -- Speech: Normal     Psychiatric - Orientation to person, place, and time: Normal -- Mood and affect: Normal       Results/Data  Diagnostic Studies Reviewed Cardio:   Lab Review: Aug 2017: Cr 1 354 2   Echocardiogram/MICHELLE: EF: 35%        Signatures   Electronically signed by : Sriram Mitchell DO; Nov 2 2017  2:38PM EST                       (Author)

## 2017-11-16 ENCOUNTER — HOSPITAL ENCOUNTER (OUTPATIENT)
Dept: NON INVASIVE DIAGNOSTICS | Facility: CLINIC | Age: 52
Discharge: HOME | End: 2017-11-16
Payer: MEDICARE

## 2017-11-16 DIAGNOSIS — G47.33 OBSTRUCTIVE SLEEP APNEA: ICD-10-CM

## 2017-11-16 PROCEDURE — 93306 TTE W/DOPPLER COMPLETE: CPT

## 2017-12-05 ENCOUNTER — GENERIC CONVERSION - ENCOUNTER (OUTPATIENT)
Dept: OTHER | Facility: OTHER | Age: 52
End: 2017-12-05

## 2017-12-07 ENCOUNTER — TRANSCRIBE ORDERS (OUTPATIENT)
Dept: SLEEP CENTER | Facility: CLINIC | Age: 52
End: 2017-12-07

## 2017-12-07 ENCOUNTER — HOSPITAL ENCOUNTER (OUTPATIENT)
Dept: SLEEP CENTER | Facility: CLINIC | Age: 52
Discharge: HOME | End: 2017-12-07
Payer: MEDICARE

## 2017-12-07 DIAGNOSIS — G47.33 OSA (OBSTRUCTIVE SLEEP APNEA): Primary | ICD-10-CM

## 2017-12-07 DIAGNOSIS — J44.9 OSA AND COPD OVERLAP SYNDROME (HCC): ICD-10-CM

## 2017-12-07 DIAGNOSIS — G47.33 OSA AND COPD OVERLAP SYNDROME (HCC): ICD-10-CM

## 2017-12-07 NOTE — PROGRESS NOTES
Progress Note - Sleep Center   Sanjeev Barrientos CCK:[birth year] MRN: [id number]      Reason for Visit:  46 y o male here for PAP compliance check    Assessment:  Doing well with new PAP device  Sleep quality is improved and the patient reports less daytime sleepiness  Compliance data show utilization for greater than or equal to 70% of nights for greater than or equal to 4 hours per night  Plan:  Continue same    Follow up: One year    History of Present Illness:  History of KYRIE on PAP therapy  Fully compliant and deriving benefit  No snoring on current settings  The patient will sometimes fall asleep without putting his BiPAP on  Historical Information    Past Medical History:   Past Medical History:   Diagnosis Date    CHF (congestive heart failure) ([facility] [address] )     Hx of cardiac cath     Hypertension     KYRIE treated with BiPAP          Past Surgical History:   Past Surgical History:   Procedure Laterality Date    ABDOMINAL SURGERY      stomach cauterization post emesis    KNEE ARTHROSCOPY Right          Social History - see chart  History   Alcohol use: Not on file     History   Drug Use Not on file     History   Smoking Status    Not on file   Smokeless Tobacco    Not on file     Family History: No family history on file      Medications/Allergies:      Current Outpatient Prescriptions:     aspirin 81 mg chewable tablet, Chew 1 tablet daily, Disp: , Rfl: 0    carvedilol (COREG) 6 25 mg tablet, Take 1 tablet by mouth 2 (two) times a day with meals, Disp: 60 tablet, Rfl: 0    furosemide (LASIX) 40 mg tablet, Take 1 tablet by mouth 2 (two) times a day, Disp: 60 tablet, Rfl: 0    hydrALAZINE (APRESOLINE) 25 mg tablet, Take 1 tablet by mouth every 8 (eight) hours, Disp: 90 tablet, Rfl: 0    isosorbide mononitrate (IMDUR) 30 mg 24 hr tablet, Take 1 tablet by mouth daily, Disp: 30 tablet, Rfl: 0    potassium chloride (K-DUR) 10 mEq tablet, Take 2 tablets by mouth daily, Disp: 30 tablet, Rfl: 0      Objective    Vital Signs:   See Chart    Chandler Sleepiness Scale:   16    Physical Exam:    General: Alert, appropriate, cooperative, overweight    Head: NC/AT, mild retrognathia    Skin: Warm, dry    Neuro: No motor abnormalities, cranial nerves appear intact    Extremity: No clubbing, cyanosis    PAP settin/5 cm with S/T backup rate  DME Provider: [employer] [employer]    Counseling / Coordination of Care  Total clinic time spent today 15 minutes  Greater than 50% of total time was spent with the patient and / or family counseling and / or coordination of care  A description of the counseling / coordination of care: Discussed equipment and response to treatment  DUSTIN Robb    Board Certified Sleep Specialist

## 2018-01-13 VITALS
BODY MASS INDEX: 53.78 KG/M2 | WEIGHT: 315 LBS | DIASTOLIC BLOOD PRESSURE: 70 MMHG | SYSTOLIC BLOOD PRESSURE: 118 MMHG | HEIGHT: 64 IN | OXYGEN SATURATION: 95 % | HEART RATE: 76 BPM

## 2018-01-14 VITALS
SYSTOLIC BLOOD PRESSURE: 134 MMHG | HEART RATE: 94 BPM | BODY MASS INDEX: 53.78 KG/M2 | OXYGEN SATURATION: 98 % | DIASTOLIC BLOOD PRESSURE: 68 MMHG | HEIGHT: 64 IN | WEIGHT: 315 LBS

## 2018-02-02 DIAGNOSIS — I50.814 BIVENTRICULAR HEART FAILURE WITH REDUCED LEFT VENTRICULAR FUNCTION (HCC): Primary | ICD-10-CM

## 2018-02-02 RX ORDER — POTASSIUM CHLORIDE 750 MG/1
20 TABLET, FILM COATED, EXTENDED RELEASE ORAL DAILY
Qty: 30 TABLET | Refills: 5 | Status: SHIPPED | OUTPATIENT
Start: 2018-02-02 | End: 2018-05-07

## 2018-02-04 DIAGNOSIS — I50.42 CHRONIC COMBINED SYSTOLIC AND DIASTOLIC CHF, NYHA CLASS 2 (HCC): Primary | ICD-10-CM

## 2018-02-05 RX ORDER — POTASSIUM CHLORIDE 1500 MG/1
TABLET, FILM COATED, EXTENDED RELEASE ORAL
Qty: 90 TABLET | Refills: 0 | Status: SHIPPED | OUTPATIENT
Start: 2018-02-05 | End: 2018-05-07 | Stop reason: SDUPTHER

## 2018-02-24 ENCOUNTER — TRANSCRIBE ORDERS (OUTPATIENT)
Dept: LAB | Facility: CLINIC | Age: 53
End: 2018-02-24

## 2018-02-24 ENCOUNTER — APPOINTMENT (OUTPATIENT)
Dept: LAB | Facility: CLINIC | Age: 53
End: 2018-02-24
Payer: MEDICARE

## 2018-02-24 DIAGNOSIS — Z11.4 SCREENING FOR HUMAN IMMUNODEFICIENCY VIRUS: ICD-10-CM

## 2018-02-24 DIAGNOSIS — R53.83 FATIGUE, UNSPECIFIED TYPE: Primary | ICD-10-CM

## 2018-02-24 DIAGNOSIS — R53.83 FATIGUE, UNSPECIFIED TYPE: ICD-10-CM

## 2018-02-24 DIAGNOSIS — E78.2 MIXED HYPERLIPIDEMIA: ICD-10-CM

## 2018-02-24 DIAGNOSIS — Z11.59 SCREENING EXAMINATION FOR POLIOMYELITIS: ICD-10-CM

## 2018-02-24 LAB
ALBUMIN SERPL BCP-MCNC: 3.4 G/DL (ref 3.5–5)
ALP SERPL-CCNC: 76 U/L (ref 46–116)
ALT SERPL W P-5'-P-CCNC: 63 U/L (ref 12–78)
ANION GAP SERPL CALCULATED.3IONS-SCNC: 4 MMOL/L (ref 4–13)
AST SERPL W P-5'-P-CCNC: 26 U/L (ref 5–45)
BACTERIA UR QL AUTO: ABNORMAL /HPF
BASOPHILS # BLD AUTO: 0.11 THOUSANDS/ΜL (ref 0–0.1)
BASOPHILS NFR BLD AUTO: 1 % (ref 0–1)
BILIRUB SERPL-MCNC: 0.6 MG/DL (ref 0.2–1)
BILIRUB UR QL STRIP: NEGATIVE
BUN SERPL-MCNC: 22 MG/DL (ref 5–25)
CALCIUM SERPL-MCNC: 9.3 MG/DL (ref 8.3–10.1)
CHLORIDE SERPL-SCNC: 102 MMOL/L (ref 100–108)
CHOLEST SERPL-MCNC: 183 MG/DL (ref 50–200)
CLARITY UR: CLEAR
CO2 SERPL-SCNC: 32 MMOL/L (ref 21–32)
COLOR UR: YELLOW
CREAT SERPL-MCNC: 1.3 MG/DL (ref 0.6–1.3)
EOSINOPHIL # BLD AUTO: 0.47 THOUSAND/ΜL (ref 0–0.61)
EOSINOPHIL NFR BLD AUTO: 5 % (ref 0–6)
ERYTHROCYTE [DISTWIDTH] IN BLOOD BY AUTOMATED COUNT: 13.6 % (ref 11.6–15.1)
GFR SERPL CREATININE-BSD FRML MDRD: 63 ML/MIN/1.73SQ M
GLUCOSE P FAST SERPL-MCNC: 90 MG/DL (ref 65–99)
GLUCOSE UR STRIP-MCNC: NEGATIVE MG/DL
HCT VFR BLD AUTO: 45 % (ref 36.5–49.3)
HDLC SERPL-MCNC: 35 MG/DL (ref 40–60)
HGB BLD-MCNC: 14.6 G/DL (ref 12–17)
HGB UR QL STRIP.AUTO: ABNORMAL
KETONES UR STRIP-MCNC: NEGATIVE MG/DL
LDLC SERPL CALC-MCNC: 121 MG/DL (ref 0–100)
LEUKOCYTE ESTERASE UR QL STRIP: ABNORMAL
LYMPHOCYTES # BLD AUTO: 1.9 THOUSANDS/ΜL (ref 0.6–4.47)
LYMPHOCYTES NFR BLD AUTO: 19 % (ref 14–44)
MCH RBC QN AUTO: 28.9 PG (ref 26.8–34.3)
MCHC RBC AUTO-ENTMCNC: 32.4 G/DL (ref 31.4–37.4)
MCV RBC AUTO: 89 FL (ref 82–98)
MONOCYTES # BLD AUTO: 0.79 THOUSAND/ΜL (ref 0.17–1.22)
MONOCYTES NFR BLD AUTO: 8 % (ref 4–12)
NEUTROPHILS # BLD AUTO: 7.02 THOUSANDS/ΜL (ref 1.85–7.62)
NEUTS SEG NFR BLD AUTO: 67 % (ref 43–75)
NITRITE UR QL STRIP: NEGATIVE
NON-SQ EPI CELLS URNS QL MICRO: ABNORMAL /HPF
PH UR STRIP.AUTO: 7 [PH] (ref 4.5–8)
PLATELET # BLD AUTO: 217 THOUSANDS/UL (ref 149–390)
PMV BLD AUTO: 11 FL (ref 8.9–12.7)
POTASSIUM SERPL-SCNC: 4.2 MMOL/L (ref 3.5–5.3)
PROT SERPL-MCNC: 8.2 G/DL (ref 6.4–8.2)
PROT UR STRIP-MCNC: ABNORMAL MG/DL
RBC # BLD AUTO: 5.06 MILLION/UL (ref 3.88–5.62)
RBC #/AREA URNS AUTO: ABNORMAL /HPF
SODIUM SERPL-SCNC: 138 MMOL/L (ref 136–145)
SP GR UR STRIP.AUTO: 1.01 (ref 1–1.03)
TRIGL SERPL-MCNC: 136 MG/DL
TSH SERPL DL<=0.05 MIU/L-ACNC: 1.64 UIU/ML (ref 0.36–3.74)
UROBILINOGEN UR QL STRIP.AUTO: 0.2 E.U./DL
WBC # BLD AUTO: 10.29 THOUSAND/UL (ref 4.31–10.16)
WBC #/AREA URNS AUTO: ABNORMAL /HPF

## 2018-02-24 PROCEDURE — 80061 LIPID PANEL: CPT

## 2018-02-24 PROCEDURE — 84443 ASSAY THYROID STIM HORMONE: CPT

## 2018-02-24 PROCEDURE — 36415 COLL VENOUS BLD VENIPUNCTURE: CPT

## 2018-02-24 PROCEDURE — 86803 HEPATITIS C AB TEST: CPT

## 2018-02-24 PROCEDURE — 80053 COMPREHEN METABOLIC PANEL: CPT

## 2018-02-24 PROCEDURE — 85025 COMPLETE CBC W/AUTO DIFF WBC: CPT

## 2018-02-24 PROCEDURE — 87389 HIV-1 AG W/HIV-1&-2 AB AG IA: CPT

## 2018-02-24 PROCEDURE — 81001 URINALYSIS AUTO W/SCOPE: CPT | Performed by: FAMILY MEDICINE

## 2018-02-25 LAB — HCV AB SER QL: NORMAL

## 2018-02-26 LAB — HIV 1+2 AB+HIV1 P24 AG SERPL QL IA: NORMAL

## 2018-03-16 ENCOUNTER — OFFICE VISIT (OUTPATIENT)
Dept: CARDIOLOGY CLINIC | Facility: CLINIC | Age: 53
End: 2018-03-16
Payer: MEDICARE

## 2018-03-16 ENCOUNTER — TELEPHONE (OUTPATIENT)
Dept: CARDIOLOGY CLINIC | Facility: CLINIC | Age: 53
End: 2018-03-16

## 2018-03-16 VITALS
BODY MASS INDEX: 66.94 KG/M2 | OXYGEN SATURATION: 95 % | HEART RATE: 78 BPM | WEIGHT: 315 LBS | DIASTOLIC BLOOD PRESSURE: 72 MMHG | SYSTOLIC BLOOD PRESSURE: 128 MMHG

## 2018-03-16 DIAGNOSIS — I50.22 CHRONIC SYSTOLIC CHF (CONGESTIVE HEART FAILURE), NYHA CLASS 2 (HCC): Primary | ICD-10-CM

## 2018-03-16 DIAGNOSIS — I10 HTN (HYPERTENSION), BENIGN: ICD-10-CM

## 2018-03-16 DIAGNOSIS — E66.01 MORBID OBESITY DUE TO EXCESS CALORIES (HCC): ICD-10-CM

## 2018-03-16 PROCEDURE — 99214 OFFICE O/P EST MOD 30 MIN: CPT | Performed by: INTERNAL MEDICINE

## 2018-03-16 RX ORDER — CARVEDILOL 12.5 MG/1
12.5 TABLET ORAL 2 TIMES DAILY WITH MEALS
Qty: 60 TABLET | Refills: 3 | Status: SHIPPED | OUTPATIENT
Start: 2018-03-16 | End: 2018-07-14 | Stop reason: SDUPTHER

## 2018-03-16 RX ORDER — SPIRONOLACTONE 25 MG/1
25 TABLET ORAL DAILY
Qty: 30 TABLET | Refills: 2 | Status: SHIPPED | OUTPATIENT
Start: 2018-03-16 | End: 2018-06-12 | Stop reason: SDUPTHER

## 2018-03-16 RX ORDER — DICLOFENAC SODIUM 75 MG/1
75 TABLET, DELAYED RELEASE ORAL 2 TIMES DAILY
COMMUNITY
Start: 2018-02-19 | End: 2018-06-15

## 2018-03-16 NOTE — TELEPHONE ENCOUNTER
[facility] pharmacist called, concerned about filling spirinolactone script since pt also on potassium and lasix  I advised that potassium level followed closely, and BMP ordered today    FYI

## 2018-03-16 NOTE — PROGRESS NOTES
Heart Failure Outpatient Progress Note - Baron Platt 46 y o  male MRN: [id number]    @ Encounter: [id number]      Assessment/Plan:    Patient Active Problem List    Diagnosis Date Noted    Acute on chronic combined systolic and diastolic congestive heart failure ([facility] ) 06/29/2017    S[facility]is [facility] ) 06/29/2017    Urinary tract infection 06/29/2017    Essential hypertension 06/29/2017    Morbid obesity due to excess calories ([facility] ) 06/29/2017    VIRA (acute kidney injury) ([place] [address] ) 06/29/2017     1  Chronic systolic Heart failure, Stage C, NYHA 2  Etiology: NICM- obesity, HTN  Weight: 390 lbs , up from 373 lbs    TODAY'S PLAN:  I explained to the patient that prophylactic ICD is recommended per ACC/AHA/HRS guidelines given EF is < 35% for greater than 3 months from time of initial diagnosis despite optimized medical therapy  ICD implantation is performed for the primary goal or reducing arrhythmogenic and total mortality as evidenced in multiple trials  When applicable, per guideline recommendations for CRT therapy, a BIV device will be implanted to provide ICD therapy as well as resynchronization to optimize LV myocardial synchrony and cardiac output  In this circumstance the patient was explained that not all patients will achieve desired response (improved functional capacity, NYHA class, decreased LVESd on echo)  'Non responder rate can approach 40% based on patients particular type of cardiomyopathy, level and location of scar and amount of electrical dyssynchrony  Consent for the procedure including procedural risks will be obtained by implanting electrophysiologist    He has refused ICD today  I instructed him to try to avoid Diclofenac  He is drinking way too much- try to limit to 2 liters a day   Needs better HF outpt management  --2g sodium diet  - Daily weights    Echocardiogram 11/16/17  LVEF: 35%  LVIDd: 6 5  RV:  MR:  PASP:  RVOT:   Other:    Neurohormonal Blockade:  --Beta-Blocker: coreg 12 5 mg BID  --ACEi, ARB or ARNi: hydralazine 25 mg TID, Imdur 30 mg daily    (or SVR reduction)  --Aldosterone Receptor Blocker:  --Diuretic: lasix 40 mg BID, K 20 meq daily    Sudden Cardiac Death Risk Reduction:  --ICD: recommended; too big for lifevest  Interrogation:     Electrical Resynchronization:  --narrow QRS  Interrogation:     Advanced Therapies (If appropriate): --Inotrope:  --LVAD/Transplant Candidacy:    # Morbid obesity  # HTN  # KYRIE- CPAP    HPI:   47 yo male presents for follow up after hospitalization for acute systolic heart failure  He follows with Dr Terrance De Leon for BiV heart failure, obesity and KYRIE  He states he did not have medical insurance for 2 5 years and then got Medicare, went to see his family doctor who sent him to ER due to his volume overload  He had not been on medicines  He is down 24 lbs since his admission  He reports he had a cardiac cath back around 2010 which was negative  He was discharged from the hospital on coreg 6 25 mg BID, hydralazine and imdur  His last Cr was 1 39  He was discharged on lasix 40 mg BID  HE is low sodium in his diet  His lost 12 more lbs since his discharge  He is getting cramping  Interval History:  CPAP mask broke    Past Medical History:   Diagnosis Date    CHF (congestive heart failure) (HCC)     Hx of cardiac cath     Hypertension     KYRIE treated with BiPAP        Review of Systems - Negative except Weight is up today    Allergies   Allergen Reactions    Penicillins            Current Outpatient Prescriptions:     aspirin 81 mg chewable tablet, Chew 1 tablet daily, Disp: , Rfl: 0    carvedilol (COREG) 6 25 mg tablet, Take 1 tablet by mouth 2 (two) times a day with meals, Disp: 60 tablet, Rfl: 0    diclofenac (VOLTAREN) 75 mg EC tablet, Take 75 mg by mouth 2 (two) times a day, Disp: , Rfl:     furosemide (LASIX) 40 mg tablet, Take 1 tablet by mouth 2 (two) times a day, Disp: 60 tablet, Rfl: 0    hydrALAZINE (APRESOLINE) 25 mg tablet, Take 1 tablet by mouth every 8 (eight) hours, Disp: 90 tablet, Rfl: 0    isosorbide mononitrate (IMDUR) 30 mg 24 hr tablet, Take 1 tablet by mouth daily, Disp: 30 tablet, Rfl: 0    Potassium Chloride ER 20 MEQ TBCR, TAKE 1 TABLET BY MOUTH EVERY DAY, Disp: 90 tablet, Rfl: 0    VENTOLIN  (90 Base) MCG/ACT inhaler, Take 1 puff by mouth every 6 (six) hours as needed for wheezing, Disp: , Rfl: 5    potassium chloride (K-DUR) 10 mEq tablet, Take 2 tablets (20 mEq total) by mouth daily, Disp: 30 tablet, Rfl: 5    Social History     Social History    Marital status: /Civil Union     Spouse name: N/A    Number of children: N/A    Years of education: N/A     Occupational History    Not on file  Social History Main Topics    Smoking status: Never Smoker    Smokeless tobacco: Not on file    Alcohol use No    Drug use: No    Sexual activity: Not on file     Other Topics Concern    Not on file     Social History Narrative    No narrative on file       No family history on file      Physical Exam:    Vitals:   Vitals:    03/16/18 0921   BP: 128/72   Pulse: 78   SpO2: 95%       Wt Readings from Last 3 Encounters:   03/16/18 (!) 177 kg (390 lb)   11/02/17 (!) 169 kg (373 lb 2 oz)   08/02/17 (!) 172 kg (380 lb 0 9 oz)       Physical Exam:    GEN: Keron Oliva appears well, alert and oriented x 3, pleasant and cooperative   HEENT: pupils equal, round, and reactive to light; extraocular muscles intact  NECK: supple, no carotid bruits   HEART: regular rhythm, normal S1 and S2, no murmurs, clicks, gallops or rubs, JVP is    LUNGS: clear to auscultation bilaterally; no wheezes, rales, or rhonchi   ABDOMEN: normal bowel sounds, soft, no tenderness, no distention  EXTREMITIES: peripheral pulses normal; no clubbing, cyanosis, or edema  NEURO: no focal findings   SKIN: normal without suspicious lesions on exposed skin    Labs & Results:    Lab Results   Component Value Date    GLUCOSE 108 08/08/2017    CALCIUM 9 3 02/24/2018     02/24/2018    K 4 2 02/24/2018    CO2 32 02/24/2018     02/24/2018    BUN 22 02/24/2018    CREATININE 1 30 02/24/2018     Lab Results   Component Value Date    WBC 10 29 (H) 02/24/2018    HGB 14 6 02/24/2018    HCT 45 0 02/24/2018    MCV 89 02/24/2018     02/24/2018     Lab Results   Component Value Date    NTBNP 1,748 (H) 06/29/2017      Lab Results   Component Value Date    CHOL 183 02/24/2018    CHOL 163 06/30/2017    CHOL 149 12/28/2014     Lab Results   Component Value Date    HDL 35 (L) 02/24/2018    HDL 43 06/30/2017    HDL 43 12/28/2014     Lab Results   Component Value Date    LDLCALC 121 (H) 02/24/2018    LDLCALC 103 (H) 06/30/2017    LDLCALC 88 12/28/2014     Lab Results   Component Value Date    TRIG 136 02/24/2018    TRIG 87 06/30/2017    TRIG 88 12/28/2014     No components found for: CHOLHDL    Echocardiogram  LVEF:   LVIDd:  RV:  MR:  PASP:  RVOT:   Other:    EKG personally reviewed by Bridget Feldman DO  Counseling / Coordination of Care  Total floor / unit time spent today 25 minutes  Greater than 50% of total time was spent with the patient and / or family counseling and / or coordination of care  A description of the counseling / coordination of care: 15      Thank you for the opportunity to participate in the care of this patient    MARIETTA BLOOD 29 Shivani Mireles

## 2018-03-16 NOTE — LETTER
March 16, 2018     Ann Stone, [address] [place] [address]  [place] [phone]    Patient: Baron Platt   YOB: 1965   Date of Visit: 3/16/2018       Dear Dr Kermit Christiansen:    Thank you for referring Zhanna Frank to me for evaluation  Below are my notes for this consultation  If you have questions, please do not hesitate to call me  I look forward to following your patient along with you  Sincerely,        Jb Ruvalcaba DO        CC: No Recipients  Jb Ruvalcaba DO  3/16/2018  9:45 AM  Sign at close encounter    Heart Failure Outpatient Progress Note - Baron Platt 46 y o  male MRN: [id number]    @ Encounter: [id number]      Assessment/Plan:    Patient Active Problem List    Diagnosis Date Noted    Acute on chronic combined systolic and diastolic congestive heart failure ([address] ) 06/29/2017    Sepsis ([facility] [address] ) 06/29/2017    Urinary tract infection 06/29/2017    Essential hypertension 06/29/2017    Morbid obesity due to excess calories ([facility] [address] ) 06/29/2017    VIRA (acute kidney injury) ([facility] [address] ) 06/29/2017     1  Chronic systolic Heart failure, Stage C, NYHA 2  Etiology: NICM- obesity, HTN  Weight: 390 lbs , up from 373 lbs    TODAY'S PLAN:  I explained to the patient that prophylactic ICD is recommended per ACC/AHA/HRS guidelines given EF is < 35% for greater than 3 months from time of initial diagnosis despite optimized medical therapy  ICD implantation is performed for the primary goal or reducing arrhythmogenic and total mortality as evidenced in multiple trials  When applicable, per guideline recommendations for CRT therapy, a BIV device will be implanted to provide ICD therapy as well as resynchronization to optimize LV myocardial synchrony and cardiac output  In this circumstance the patient was explained that not all patients will achieve desired response (improved functional capacity, NYHA class, decreased LVESd on echo)   'Non responder rate can approach 40% based on patients particular type of cardiomyopathy, level and location of scar and amount of electrical dyssynchrony  Consent for the procedure including procedural risks will be obtained by implanting electrophysiologist    He has refused ICD today  I instructed him to try to avoid Diclofenac  He is drinking way too much- try to limit to 2 liters a day  Needs better HF outpt management  --2g sodium diet  - Daily weights    Echocardiogram 11/16/17  LVEF: 35%  LVIDd: 6 5  RV:  MR:  PASP:  RVOT:   Other:    Neurohormonal Blockade:  --Beta-Blocker: coreg 12 5 mg BID  --ACEi, ARB or ARNi: hydralazine 25 mg TID, Imdur 30 mg daily    (or SVR reduction)  --Aldosterone Receptor Blocker:  --Diuretic: lasix 40 mg BID, K 20 meq daily    Sudden Cardiac Death Risk Reduction:  --ICD: recommended; too big for lifevest  Interrogation:     Electrical Resynchronization:  --narrow QRS  Interrogation:     Advanced Therapies (If appropriate): --Inotrope:  --LVAD/Transplant Candidacy:    # Morbid obesity  # HTN  # KYRIE- CPAP    HPI:   45 yo male presents for follow up after hospitalization for acute systolic heart failure  He follows with Dr Meron Espinoza for BiV heart failure, obesity and KYRIE  He states he did not have medical insurance for 2 5 years and then got Medicare, went to see his family doctor who sent him to ER due to his volume overload  He had not been on medicines  He is down 24 lbs since his admission  He reports he had a cardiac cath back around 2010 which was negative  He was discharged from the hospital on coreg 6 25 mg BID, hydralazine and imdur  His last Cr was 1 39  He was discharged on lasix 40 mg BID  HE is low sodium in his diet  His lost 12 more lbs since his discharge  He is getting cramping       Interval History:  CPAP mask broke    Past Medical History:   Diagnosis Date    CHF (congestive heart failure) (HCC)     Hx of cardiac cath     Hypertension     KYRIE treated with BiPAP        Review of Systems - Negative except Weight is up today    Allergies   Allergen Reactions    Penicillins        Current Outpatient Prescriptions:     aspirin 81 mg chewable tablet, Chew 1 tablet daily, Disp: , Rfl: 0    carvedilol (COREG) 6 25 mg tablet, Take 1 tablet by mouth 2 (two) times a day with meals, Disp: 60 tablet, Rfl: 0    diclofenac (VOLTAREN) 75 mg EC tablet, Take 75 mg by mouth 2 (two) times a day, Disp: , Rfl:     furosemide (LASIX) 40 mg tablet, Take 1 tablet by mouth 2 (two) times a day, Disp: 60 tablet, Rfl: 0    hydrALAZINE (APRESOLINE) 25 mg tablet, Take 1 tablet by mouth every 8 (eight) hours, Disp: 90 tablet, Rfl: 0    isosorbide mononitrate (IMDUR) 30 mg 24 hr tablet, Take 1 tablet by mouth daily, Disp: 30 tablet, Rfl: 0    Potassium Chloride ER 20 MEQ TBCR, TAKE 1 TABLET BY MOUTH EVERY DAY, Disp: 90 tablet, Rfl: 0    VENTOLIN  (90 Base) MCG/ACT inhaler, Take 1 puff by mouth every 6 (six) hours as needed for wheezing, Disp: , Rfl: 5    potassium chloride (K-DUR) 10 mEq tablet, Take 2 tablets (20 mEq total) by mouth daily, Disp: 30 tablet, Rfl: 5    Social History     Social History    Marital status: /Civil Union     Spouse name: N/A    Number of children: N/A    Years of education: N/A     Occupational History    Not on file  Social History Main Topics    Smoking status: Never Smoker    Smokeless tobacco: Not on file    Alcohol use No    Drug use: No    Sexual activity: Not on file     Other Topics Concern    Not on file     Social History Narrative    No narrative on file       No family history on file      Physical Exam:    Vitals:   Vitals:    03/16/18 0921   BP: 128/72   Pulse: 78   SpO2: 95%       Wt Readings from Last 3 Encounters:   03/16/18 (!) 177 kg (390 lb)   11/02/17 (!) 169 kg (373 lb 2 oz)   08/02/17 (!) 172 kg (380 lb 0 9 oz)       Physical Exam:    GEN: Ning Taylor appears well, alert and oriented x 3, pleasant and cooperative   HEENT: pupils equal, round, and reactive to light; extraocular muscles intact  NECK: supple, no carotid bruits   HEART: regular rhythm, normal S1 and S2, no murmurs, clicks, gallops or rubs, JVP is    LUNGS: clear to auscultation bilaterally; no wheezes, rales, or rhonchi   ABDOMEN: normal bowel sounds, soft, no tenderness, no distention  EXTREMITIES: peripheral pulses normal; no clubbing, cyanosis, or edema  NEURO: no focal findings   SKIN: normal without suspicious lesions on exposed skin    Labs & Results:    Lab Results   Component Value Date    GLUCOSE 108 08/08/2017    CALCIUM 9 3 02/24/2018     02/24/2018    K 4 2 02/24/2018    CO2 32 02/24/2018     02/24/2018    BUN 22 02/24/2018    CREATININE 1 30 02/24/2018     Lab Results   Component Value Date    WBC 10 29 (H) 02/24/2018    HGB 14 6 02/24/2018    HCT 45 0 02/24/2018    MCV 89 02/24/2018     02/24/2018     Lab Results   Component Value Date    NTBNP 1,748 (H) 06/29/2017      Lab Results   Component Value Date    CHOL 183 02/24/2018    CHOL 163 06/30/2017    CHOL 149 12/28/2014     Lab Results   Component Value Date    HDL 35 (L) 02/24/2018    HDL 43 06/30/2017    HDL 43 12/28/2014     Lab Results   Component Value Date    LDLCALC 121 (H) 02/24/2018    LDLCALC 103 (H) 06/30/2017    LDLCALC 88 12/28/2014     Lab Results   Component Value Date    TRIG 136 02/24/2018    TRIG 87 06/30/2017    TRIG 88 12/28/2014     No components found for: CHOLHDL    Echocardiogram  LVEF:   LVIDd:  RV:  MR:  PASP:  RVOT:   Other:    EKG personally reviewed by Kyle Root DO  Counseling / Coordination of Care  Total floor / unit time spent today 25 minutes  Greater than 50% of total time was spent with the patient and / or family counseling and / or coordination of care  A description of the counseling / coordination of care: 15      Thank you for the opportunity to participate in the care of this patient    MARIETTA CARO   DIRECTOR OF HEART FAILURE  MEDICAL DIRECTOR OF LVAD PROGRAM  [facility]

## 2018-04-23 DIAGNOSIS — I50.42 CHRONIC COMBINED SYSTOLIC AND DIASTOLIC CHF, NYHA CLASS 2 (HCC): ICD-10-CM

## 2018-04-23 RX ORDER — POTASSIUM CHLORIDE 1500 MG/1
1 TABLET, FILM COATED, EXTENDED RELEASE ORAL DAILY
Qty: 90 TABLET | Refills: 0 | Status: CANCELLED | OUTPATIENT
Start: 2018-04-23

## 2018-05-02 DIAGNOSIS — I50.42 CHRONIC COMBINED SYSTOLIC AND DIASTOLIC CHF, NYHA CLASS 2 (HCC): ICD-10-CM

## 2018-05-02 RX ORDER — POTASSIUM CHLORIDE 1500 MG/1
1 TABLET, FILM COATED, EXTENDED RELEASE ORAL DAILY
Qty: 90 TABLET | Refills: 0 | Status: CANCELLED | OUTPATIENT
Start: 2018-05-02

## 2018-05-07 DIAGNOSIS — I50.42 CHRONIC COMBINED SYSTOLIC AND DIASTOLIC CHF, NYHA CLASS 2 (HCC): ICD-10-CM

## 2018-05-07 RX ORDER — POTASSIUM CHLORIDE 1500 MG/1
1 TABLET, FILM COATED, EXTENDED RELEASE ORAL DAILY
Qty: 90 TABLET | Refills: 3 | Status: SHIPPED | OUTPATIENT
Start: 2018-05-07 | End: 2018-08-02 | Stop reason: SDUPTHER

## 2018-06-12 DIAGNOSIS — I50.22 CHRONIC SYSTOLIC CHF (CONGESTIVE HEART FAILURE), NYHA CLASS 2 (HCC): ICD-10-CM

## 2018-06-12 RX ORDER — SPIRONOLACTONE 25 MG/1
25 TABLET ORAL DAILY
Qty: 30 TABLET | Refills: 2 | Status: SHIPPED | OUTPATIENT
Start: 2018-06-12 | End: 2018-06-15 | Stop reason: SINTOL

## 2018-06-15 ENCOUNTER — OFFICE VISIT (OUTPATIENT)
Dept: CARDIOLOGY CLINIC | Facility: CLINIC | Age: 53
End: 2018-06-15
Payer: MEDICARE

## 2018-06-15 VITALS
SYSTOLIC BLOOD PRESSURE: 112 MMHG | DIASTOLIC BLOOD PRESSURE: 66 MMHG | WEIGHT: 315 LBS | BODY MASS INDEX: 52.48 KG/M2 | OXYGEN SATURATION: 98 % | HEIGHT: 65 IN | HEART RATE: 80 BPM

## 2018-06-15 DIAGNOSIS — E66.01 MORBID OBESITY WITH BMI OF 40.0-44.9, ADULT (HCC): ICD-10-CM

## 2018-06-15 DIAGNOSIS — I50.22 CHRONIC SYSTOLIC CHF (CONGESTIVE HEART FAILURE), NYHA CLASS 2 (HCC): Primary | ICD-10-CM

## 2018-06-15 DIAGNOSIS — Z99.89 OSA ON CPAP: ICD-10-CM

## 2018-06-15 DIAGNOSIS — I42.0 DILATED CARDIOMYOPATHY (HCC): ICD-10-CM

## 2018-06-15 DIAGNOSIS — G47.33 OSA ON CPAP: ICD-10-CM

## 2018-06-15 PROCEDURE — 99214 OFFICE O/P EST MOD 30 MIN: CPT | Performed by: INTERNAL MEDICINE

## 2018-06-15 RX ORDER — THIAMINE HCL 100 MG
1 TABLET ORAL DAILY
COMMUNITY

## 2018-06-15 RX ORDER — EPLERENONE 25 MG/1
25 TABLET, FILM COATED ORAL DAILY
Qty: 30 TABLET | Refills: 3 | Status: SHIPPED | OUTPATIENT
Start: 2018-06-15 | End: 2018-10-15 | Stop reason: ALTCHOICE

## 2018-06-15 NOTE — PROGRESS NOTES
Heart Failure Outpatient Progress Note - Francis Kohler 46 y o  male MRN: [id number]    @ Encounter: [id number]      Assessment/Plan:    Patient Active Problem List    Diagnosis Date Noted    Acute on chronic combined systolic and diastolic congestive heart failure ([facility] ) 06/29/2017    Sepsis ([facility] ) 06/29/2017    Urinary tract infection 06/29/2017    Essential hypertension 06/29/2017    Morbid obesity due to excess calories ([facility] ) 06/29/2017    VIRA (acute kidney injury) ([facility] ) 06/29/2017     1  Chronic systolic Heart failure, Stage C, NYHA 2  Etiology: NICM- obesity, HTN  Weight: 384 lbs, prior 390 lbs , up from 373 lbs    TODAY'S PLAN:  I explained to the patient that prophylactic ICD is recommended per ACC/AHA/HRS guidelines given EF is < 35% for greater than 3 months from time of initial diagnosis despite optimized medical therapy  ICD implantation is performed for the primary goal or reducing arrhythmogenic and total mortality as evidenced in multiple trials  When applicable, per guideline recommendations for CRT therapy, a BIV device will be implanted to provide ICD therapy as well as resynchronization to optimize LV myocardial synchrony and cardiac output  In this circumstance the patient was explained that not all patients will achieve desired response (improved functional capacity, NYHA class, decreased LVESd on echo)  'Non responder rate can approach 40% based on patients particular type of cardiomyopathy, level and location of scar and amount of electrical dyssynchrony  Consent for the procedure including procedural risks will be obtained by implanting electrophysiologist    He has refused ICD today  I instructed him to try to avoid Diclofenac  He is drinking way too much- try to limit to 2 liters a day   Needs better HF outpt management  --2g sodium diet  - Daily weights    Echocardiogram 11/16/17  LVEF: 35%  LVIDd: 6 5  RV: dilated  MR:  PASP:  RVOT:   Other:    Neurohormonal Blockade:  --Beta-Blocker: coreg 12 5 mg BID  --ACEi, ARB or ARNi: hydralazine 25 mg TID, Imdur 30 mg daily    (or SVR reduction)  --Aldosterone Receptor Blocker:  --Diuretic: lasix 40 mg BID, K 20 meq daily    Sudden Cardiac Death Risk Reduction:  --ICD: recommended; too big for lifevest  Interrogation:     Electrical Resynchronization:  --narrow QRS  Interrogation:     Advanced Therapies (If appropriate): --Inotrope:  --LVAD/Transplant Candidacy:    # Morbid obesity  # HTN  # KYRIE- CPAP    HPI:   45 yo male presents for follow up after hospitalization for acute systolic heart failure  He follows with Dr Estrella Back for BiV heart failure, obesity and KYRIE  He states he did not have medical insurance for 2 5 years and then got Medicare, went to see his family doctor who sent him to ER due to his volume overload  He had not been on medicines  He is down 24 lbs since his admission  He reports he had a cardiac cath back around 2010 which was negative  He was discharged from the hospital on coreg 6 25 mg BID, hydralazine and imdur  His last Cr was 1 39  He was discharged on lasix 40 mg BID  HE is low sodium in his diet  His lost 12 more lbs since his discharge  He is getting cramping  Interval History:  A lot of arthritis pain    Past Medical History:   Diagnosis Date    CHF (congestive heart failure) (HCC)     Hx of cardiac cath     Hypertension     KYRIE treated with BiPAP        Review of Systems - Negative except Weight is up today, breast tenderness on spironolactone    Allergies   Allergen Reactions    Penicillins            Current Outpatient Prescriptions:     aspirin 81 mg chewable tablet, Chew 1 tablet daily, Disp: , Rfl: 0    carvedilol (COREG) 12 5 mg tablet, Take 1 tablet (12 5 mg total) by mouth 2 (two) times a day with meals, Disp: 60 tablet, Rfl: 3    furosemide (LASIX) 40 mg tablet, Take 1 tablet by mouth 2 (two) times a day, Disp: 60 tablet, Rfl: 0    hydrALAZINE (APRESOLINE) 25 mg tablet, Take 1 tablet by mouth every 8 (eight) hours, Disp: 90 tablet, Rfl: 0    isosorbide mononitrate (IMDUR) 30 mg 24 hr tablet, Take 1 tablet by mouth daily, Disp: 30 tablet, Rfl: 0    Magnesium 400 MG CAPS, Take 1 capsule by mouth daily, Disp: , Rfl:     Potassium Chloride ER 20 MEQ TBCR, Take 1 tablet (20 mEq total) by mouth daily, Disp: 90 tablet, Rfl: 3    spironolactone (ALDACTONE) 25 mg tablet, TAKE 1 TABLET (25 MG TOTAL) BY MOUTH DAILY, Disp: 30 tablet, Rfl: 2    VENTOLIN  (90 Base) MCG/ACT inhaler, Take 1 puff by mouth every 6 (six) hours as needed for wheezing, Disp: , Rfl: 5    Social History     Social History    Marital status: /Civil Union     Spouse name: N/A    Number of children: N/A    Years of education: N/A     Occupational History    Not on file  Social History Main Topics    Smoking status: Never Smoker    Smokeless tobacco: Not on file    Alcohol use No    Drug use: No    Sexual activity: Not on file     Other Topics Concern    Not on file     Social History Narrative    No narrative on file       No family history on file      Physical Exam:    Vitals:   Vitals:    06/15/18 1602   BP: 112/66   Pulse: 80   SpO2: 98%       Wt Readings from Last 3 Encounters:   06/15/18 (!) 175 kg (384 lb 12 8 oz)   03/16/18 (!) 177 kg (390 lb)   11/02/17 (!) 169 kg (373 lb 2 oz)       Physical Exam:    GEN: Francis Kohler appears well, alert and oriented x 3, pleasant and cooperative   HEENT: pupils equal, round, and reactive to light; extraocular muscles intact  NECK: supple, no carotid bruits   HEART: regular rhythm, normal S1 and S2, no murmurs, clicks, gallops or rubs, JVP is    LUNGS: clear to auscultation bilaterally; no wheezes, rales, or rhonchi   ABDOMEN: normal bowel sounds, soft, no tenderness, no distention  EXTREMITIES: peripheral pulses normal; no clubbing, cyanosis, or edema  NEURO: no focal findings   SKIN: normal without suspicious lesions on exposed skin    Labs & Results:    Lab Results   Component Value Date    GLUCOSE 108 08/08/2017    CALCIUM 9 3 02/24/2018     02/24/2018    K 4 2 02/24/2018    CO2 32 02/24/2018     02/24/2018    BUN 22 02/24/2018    CREATININE 1 30 02/24/2018     Lab Results   Component Value Date    WBC 10 29 (H) 02/24/2018    HGB 14 6 02/24/2018    HCT 45 0 02/24/2018    MCV 89 02/24/2018     02/24/2018     Lab Results   Component Value Date    NTBNP 1,748 (H) 06/29/2017      Lab Results   Component Value Date    CHOL 183 02/24/2018    CHOL 163 06/30/2017    CHOL 149 12/28/2014     Lab Results   Component Value Date    HDL 35 (L) 02/24/2018    HDL 43 06/30/2017    HDL 43 12/28/2014     Lab Results   Component Value Date    LDLCALC 121 (H) 02/24/2018    LDLCALC 103 (H) 06/30/2017    LDLCALC 88 12/28/2014     Lab Results   Component Value Date    TRIG 136 02/24/2018    TRIG 87 06/30/2017    TRIG 88 12/28/2014     No components found for: CHOLHDL    EKG personally reviewed by Emi Plaza DO  Counseling / Coordination of Care  Total floor / unit time spent today 25 minutes  Greater than 50% of total time was spent with the patient and / or family counseling and / or coordination of care  A description of the counseling / coordination of care: 15      Thank you for the opportunity to participate in the care of this patient    MARIETTA BLOOD 29 Shivani Mireles

## 2018-06-21 ENCOUNTER — APPOINTMENT (OUTPATIENT)
Dept: LAB | Facility: CLINIC | Age: 53
End: 2018-06-21
Payer: MEDICARE

## 2018-06-21 LAB
ANION GAP SERPL CALCULATED.3IONS-SCNC: 4 MMOL/L (ref 4–13)
BUN SERPL-MCNC: 26 MG/DL (ref 5–25)
CALCIUM SERPL-MCNC: 9.2 MG/DL (ref 8.3–10.1)
CHLORIDE SERPL-SCNC: 105 MMOL/L (ref 100–108)
CO2 SERPL-SCNC: 31 MMOL/L (ref 21–32)
CREAT SERPL-MCNC: 1.54 MG/DL (ref 0.6–1.3)
GFR SERPL CREATININE-BSD FRML MDRD: 51 ML/MIN/1.73SQ M
GLUCOSE P FAST SERPL-MCNC: 89 MG/DL (ref 65–99)
POTASSIUM SERPL-SCNC: 4.4 MMOL/L (ref 3.5–5.3)
SODIUM SERPL-SCNC: 140 MMOL/L (ref 136–145)

## 2018-06-21 PROCEDURE — 36415 COLL VENOUS BLD VENIPUNCTURE: CPT | Performed by: INTERNAL MEDICINE

## 2018-06-21 PROCEDURE — 80048 BASIC METABOLIC PNL TOTAL CA: CPT | Performed by: INTERNAL MEDICINE

## 2018-06-28 ENCOUNTER — OFFICE VISIT (OUTPATIENT)
Dept: SLEEP CENTER | Facility: CLINIC | Age: 53
End: 2018-06-28
Payer: MEDICARE

## 2018-06-28 VITALS
HEIGHT: 64 IN | SYSTOLIC BLOOD PRESSURE: 138 MMHG | BODY MASS INDEX: 53.78 KG/M2 | HEART RATE: 76 BPM | DIASTOLIC BLOOD PRESSURE: 72 MMHG | WEIGHT: 315 LBS

## 2018-06-28 DIAGNOSIS — G47.33 OSA (OBSTRUCTIVE SLEEP APNEA): ICD-10-CM

## 2018-06-28 PROCEDURE — 99214 OFFICE O/P EST MOD 30 MIN: CPT | Performed by: INTERNAL MEDICINE

## 2018-06-28 NOTE — Clinical Note
Ankur Reyes,  I just saw Sergey Goode in follow-up regarding his sleep apnea  He is having a hard time sleeping due to frequent nocturia  On the chance that his nocturia is caused by his second dose of diuretic, would be possible to give the diuretic once a day in the morning?   Thanks,  Grant Carmichael

## 2018-06-28 NOTE — PROGRESS NOTES
Progress Note - Sleep Center   Farhan Sagastume VRX:[id number] MRN: [id number]      Reason for Visit:  46 y o male here for annual follow-up    Assessment:  Doing well on current therapy of BiPAP ST with a pressure of 9/5 cm and a backup rate of 8 per minute  for moderate obstructive and central sleep apnea (AHI = 18)  His only complaint is that sometimes the pressure feels insufficient  I will discontinue his ramp  His other problem is frequent nocturnal awakenings due to nocturia  He takes a second dose of diuretic in the late afternoon  Perhaps consolidating diuretic for morning use only, will help  He will also undergo evaluation for prostatic hypertrophy  Plan:  As above    Follow up: One year    History of Present Illness:  History of KYRIE on PAP therapy  Fully compliant and deriving benefit        Review of Systems      Genitourinary need to urinate more than twice a night   Cardiology palpitations/fluttering feeling in the chest and ankle/leg swelling   Gastrointestinal none   Neurology awaken with headache, need to move extremities, muscle weakness, numbness/tingling of an extremity, forgetfulness, poor concentration or confusion, , difficulty with memory and balance problems   Constitutional fatigue   Integumentary none   Psychiatry depression   Musculoskeletal joint pain, muscle aches, back pain and leg cramps   Pulmonary shortness of breath with activity, chest tightness and wheezing   ENT throat clearing and ringing in ears   Endocrine excessive thirst and frequent urination   Hematological none         Historical Information    Past Medical History:   Past Medical History:   Diagnosis Date    CHF (congestive heart failure) (HCC)     Hx of cardiac cath     Hypertension     KYRIE treated with BiPAP          Past Surgical History:   Past Surgical History:   Procedure Laterality Date    ABDOMINAL SURGERY      stomach cauterization post emesis    KNEE ARTHROSCOPY Right        Social History: Social History     Social History    Marital status: /Civil Union     Spouse name: N/A    Number of children: N/A    Years of education: N/A     Social History Main Topics    Smoking status: Never Smoker    Smokeless tobacco: Never Used    Alcohol use No    Drug use: No    Sexual activity: Not Asked     Other Topics Concern    None     Social History Narrative    None       Family History: History reviewed  No pertinent family history  Medications/Allergies:      Current Outpatient Prescriptions:     aspirin 81 mg chewable tablet, Chew 1 tablet daily, Disp: , Rfl: 0    carvedilol (COREG) 12 5 mg tablet, Take 1 tablet (12 5 mg total) by mouth 2 (two) times a day with meals, Disp: 60 tablet, Rfl: 3    eplerenone (INSPRA) 25 mg tablet, Take 1 tablet (25 mg total) by mouth daily, Disp: 30 tablet, Rfl: 3    furosemide (LASIX) 40 mg tablet, Take 1 tablet by mouth 2 (two) times a day, Disp: 60 tablet, Rfl: 0    hydrALAZINE (APRESOLINE) 25 mg tablet, Take 1 tablet by mouth every 8 (eight) hours, Disp: 90 tablet, Rfl: 0    isosorbide mononitrate (IMDUR) 30 mg 24 hr tablet, Take 1 tablet by mouth daily, Disp: 30 tablet, Rfl: 0    Magnesium 400 MG CAPS, Take 1 capsule by mouth daily, Disp: , Rfl:     Potassium Chloride ER 20 MEQ TBCR, Take 1 tablet (20 mEq total) by mouth daily, Disp: 90 tablet, Rfl: 3    VENTOLIN  (90 Base) MCG/ACT inhaler, Take 1 puff by mouth every 6 (six) hours as needed for wheezing, Disp: , Rfl: 5          Objective      Vital Signs:   Vitals:    06/28/18 1400   BP: 138/72   Pulse: 76     [place] Sleepiness Scale: Total score: 22        Physical Exam:    General: Alert, appropriate, cooperative, overweight    Head: NC/AT    Skin: Warm, dry    Neuro: No motor abnormalities, cranial nerves appear intact    Extremity: No clubbing, cyanosis    PAP setting:  BiPAP ST 9/5 cm and backup rate 8  DME Provider:   Laurita Mcneil    Counseling / Coordination of Care  Total clinic time spent today 15 minutes  Greater than 50% of total time was spent with the patient and / or family counseling and / or coordination of care  A description of the counseling / coordination of care: Discussed equipment and response to treatment  DUSTIN Shen    Board Certified Sleep Specialist

## 2018-07-14 DIAGNOSIS — I50.22 CHRONIC SYSTOLIC CHF (CONGESTIVE HEART FAILURE), NYHA CLASS 2 (HCC): ICD-10-CM

## 2018-07-14 RX ORDER — FUROSEMIDE 40 MG/1
TABLET ORAL
Qty: 180 TABLET | Refills: 3 | Status: SHIPPED | OUTPATIENT
Start: 2018-07-14 | End: 2018-10-15 | Stop reason: ALTCHOICE

## 2018-07-14 RX ORDER — CARVEDILOL 12.5 MG/1
TABLET ORAL
Qty: 60 TABLET | Refills: 11 | Status: SHIPPED | OUTPATIENT
Start: 2018-07-14 | End: 2019-08-03 | Stop reason: SDUPTHER

## 2018-07-14 RX ORDER — ISOSORBIDE MONONITRATE 30 MG/1
TABLET, EXTENDED RELEASE ORAL
Qty: 90 TABLET | Refills: 3 | Status: SHIPPED | OUTPATIENT
Start: 2018-07-14 | End: 2019-07-08 | Stop reason: SDUPTHER

## 2018-08-02 DIAGNOSIS — I50.42 CHRONIC COMBINED SYSTOLIC AND DIASTOLIC CHF, NYHA CLASS 2 (HCC): ICD-10-CM

## 2018-08-02 RX ORDER — POTASSIUM CHLORIDE 1500 MG/1
1 TABLET, FILM COATED, EXTENDED RELEASE ORAL DAILY
Qty: 90 TABLET | Refills: 3 | Status: SHIPPED | OUTPATIENT
Start: 2018-08-02 | End: 2018-10-15 | Stop reason: SDUPTHER

## 2018-10-15 ENCOUNTER — OFFICE VISIT (OUTPATIENT)
Dept: CARDIOLOGY CLINIC | Facility: CLINIC | Age: 53
End: 2018-10-15
Payer: MEDICARE

## 2018-10-15 VITALS
OXYGEN SATURATION: 96 % | DIASTOLIC BLOOD PRESSURE: 64 MMHG | HEIGHT: 64 IN | WEIGHT: 315 LBS | BODY MASS INDEX: 53.78 KG/M2 | SYSTOLIC BLOOD PRESSURE: 110 MMHG | HEART RATE: 69 BPM

## 2018-10-15 DIAGNOSIS — I50.42 CHRONIC COMBINED SYSTOLIC AND DIASTOLIC CHF, NYHA CLASS 2 (HCC): ICD-10-CM

## 2018-10-15 DIAGNOSIS — I10 HTN (HYPERTENSION), BENIGN: ICD-10-CM

## 2018-10-15 DIAGNOSIS — I50.22 CHRONIC SYSTOLIC CHF (CONGESTIVE HEART FAILURE), NYHA CLASS 2 (HCC): Primary | ICD-10-CM

## 2018-10-15 DIAGNOSIS — I42.8 NICM (NONISCHEMIC CARDIOMYOPATHY) (HCC): ICD-10-CM

## 2018-10-15 DIAGNOSIS — E66.9 OBESITY (BMI 30.0-34.9): ICD-10-CM

## 2018-10-15 PROCEDURE — 99214 OFFICE O/P EST MOD 30 MIN: CPT | Performed by: INTERNAL MEDICINE

## 2018-10-15 RX ORDER — PROPRANOLOL/HYDROCHLOROTHIAZID 40 MG-25MG
TABLET ORAL DAILY
COMMUNITY
End: 2019-02-01 | Stop reason: HOSPADM

## 2018-10-15 RX ORDER — POTASSIUM CHLORIDE 1500 MG/1
2 TABLET, FILM COATED, EXTENDED RELEASE ORAL DAILY
Qty: 180 TABLET | Refills: 3 | Status: SHIPPED | OUTPATIENT
Start: 2018-10-15 | End: 2019-10-06 | Stop reason: SDUPTHER

## 2018-10-15 RX ORDER — TORSEMIDE 20 MG/1
40 TABLET ORAL
Qty: 120 TABLET | Refills: 2 | Status: SHIPPED | OUTPATIENT
Start: 2018-10-15 | End: 2019-01-11 | Stop reason: SDUPTHER

## 2018-10-15 NOTE — PROGRESS NOTES
Heart Failure Outpatient Progress Note - Cristina Tyler 46 y o  male MRN: [id number]    @ Encounter: [id number]      Assessment/Plan:    Patient Active Problem List    Diagnosis Date Noted    Acute on chronic combined systolic and diastolic congestive heart failure ([facility] ) 06/29/2017    S[facility] 06[facility]2017    Urinary tract infection 06/29/2017    Essential hypertension 06/29/2017    Morbid obesity due to excess calories ([facility] ) 06/29/2017    VIRA (acute kidney injury) ([facility] ) 06/29/2017     1  Chronic systolic Heart failure, Stage C, NYHA 2  Etiology: NICM- obesity, HTN  Weight: 389 lbs, prior 386 lbs , up from 373 lbs    TODAY'S PLAN:  I explained to the patient that prophylactic ICD is recommended per ACC/AHA/HRS guidelines given EF is < 35% for greater than 3 months from time of initial diagnosis despite optimized medical therapy  ICD implantation is performed for the primary goal or reducing arrhythmogenic and total mortality as evidenced in multiple trials  When applicable, per guideline recommendations for CRT therapy, a BIV device will be implanted to provide ICD therapy as well as resynchronization to optimize LV myocardial synchrony and cardiac output  In this circumstance the patient was explained that not all patients will achieve desired response (improved functional capacity, NYHA class, decreased LVESd on echo)  'Non responder rate can approach 40% based on patients particular type of cardiomyopathy, level and location of scar and amount of electrical dyssynchrony  Consent for the procedure including procedural risks will be obtained by implanting electrophysiologist    He has refused ICD today  I instructed him to try to avoid Diclofenac  He is drinking way too much- try to limit to 2 liters a day   Needs better HF outpt management  --2g sodium diet  - Daily weights    Echocardiogram 11/16/17  LVEF: 35%  LVIDd: 6 5  RV: dilated  MR:  PASP:  RVOT:   Other:    Neurohormonal Blockade:  --Beta-Blocker: coreg 12 5 mg BID  --ACEi, ARB or ARNi: hydralazine 25 mg TID, Imdur 30 mg daily    (or SVR reduction)  --Aldosterone Receptor Blocker: gynecomastia from spironolactone and inspra so off of them now  --Diuretic: lasix 40 mg BID, K 20 meq daily    Sudden Cardiac Death Risk Reduction:  --ICD: recommended; too big for lifevest  Interrogation:     Electrical Resynchronization:  --narrow QRS  Interrogation:     Advanced Therapies (If appropriate): --Inotrope:  --LVAD/Transplant Candidacy:    # Morbid obesity  # HTN  # KYRIE- BiPAP  # CKD, Cr 1 54 on 6/21 labs    TODAY'S PLAN:  I explained to the patient that prophylactic ICD is recommended per ACC/AHA/HRS guidelines given EF is < 35% for greater than 3 months from time of initial diagnosis despite optimized medical therapy  ICD implantation is performed for the primary goal or reducing arrhythmogenic and total mortality as evidenced in multiple trials  When applicable, per guideline recommendations for CRT therapy, a BIV device will be implanted to provide ICD therapy as well as resynchronization to optimize LV myocardial synchrony and cardiac output  In this circumstance the patient was explained that not all patients will achieve desired response (improved functional capacity, NYHA class, decreased LVESd on echo)  'Non responder rate can approach 40% based on patients particular type of cardiomyopathy, level and location of scar and amount of electrical dyssynchrony  Consent for the procedure including procedural risks will be obtained by implanting electrophysiologist    He has refused ICD today  I instructed him to try to avoid Diclofenac  He is drinking way too much- try to limit to 2 liters a day   Needs better HF outpt management    Change lasix 40 mg BID to torsemide 40 mg BID plus K 40 meq daily  Check BMP  --2g sodium diet  - Daily weights    HPI:   47 yo male presents for follow up after hospitalization for acute systolic heart failure  He follows with Dr Cheo Galindo for BiV heart failure, obesity and KYRIE  He states he did not have medical insurance for 2 5 years and then got Medicare, went to see his family doctor who sent him to ER due to his volume overload  He had not been on medicines  He is down 24 lbs since his admission  He reports he had a cardiac cath back around 2010 which was negative  He was discharged from the hospital on coreg 6 25 mg BID, hydralazine and imdur  His last Cr was 1 39  He was discharged on lasix 40 mg BID  HE is low sodium in his diet  He had lost 12 more lbs since his discharge  He is getting cramping  Interval History:  A lot of arthritis pain  Weight is the same  Last time I switched him to Inspra due to gynecomastia and they still hurt so no longer takingit    Past Medical History:   Diagnosis Date    [facility]est[facility]     Hx of cardiac cath     Hypertension     KYRIE treated with BiPAP        Review of Systems - Negative except Weight is up today, breast tenderness on spironolactone now on Inspra  State they are eating healthy    Allergies   Allergen Reactions    Penicillins        Current Outpatient Prescriptions:     aspirin 81 mg chewable tablet, Chew 1 tablet daily, Disp: , Rfl: 0    carvedilol (COREG) 12 5 mg tablet, TAKE 1 TABLET TWICE DAILY  , Disp: 60 tablet, Rfl: 11    furosemide (LASIX) 40 mg tablet, TAKE 1 TABLET TWICE DAILY, Disp: 180 tablet, Rfl: 3    hydrALAZINE (APRESOLINE) 25 mg tablet, Take 1 tablet by mouth every 8 (eight) hours, Disp: 90 tablet, Rfl: 0    isosorbide mononitrate (IMDUR) 30 mg 24 hr tablet, TAKE 1 TABLET DAILY, Disp: 90 tablet, Rfl: 3    Magnesium 500 MG TABS, Take 1 capsule by mouth daily, Disp: , Rfl:     Potassium Chloride ER 20 MEQ TBCR, Take 1 tablet (20 mEq total) by mouth daily, Disp: 90 tablet, Rfl: 3    Turmeric 500 MG CAPS, Take by mouth daily, Disp: , Rfl:     VENTOLIN  (90 Base) MCG/ACT inhaler, Take 1 puff by mouth every 6 (six) hours as needed for wheezing, Disp: , Rfl: 5    eplerenone (INSPRA) 25 mg tablet, Take 1 tablet (25 mg total) by mouth daily (Patient not taking: Reported on 10/15/2018 ), Disp: 30 tablet, Rfl: 3    Social History     Social History    Marital status: /Civil Union     Spouse name: N/A    Number of children: N/A    Years of education: N/A     Occupational History    Not on file  Social History Main Topics    Smoking status: Never Smoker    Smokeless tobacco: Never Used    Alcohol use No    Drug use: No    Sexual activity: Not on file     Other Topics Concern    Not on file     Social History Narrative    No narrative on file       No family history on file      Physical Exam:    Vitals:   Vitals:    10/15/18 1659   BP: 110/64   Pulse: 69   SpO2: 96%       Wt Readings from Last 3 Encounters:   10/15/18 (!) 176 kg (389 lb)   06/28/18 (!) 174 kg (383 lb)   06/15/18 (!) 175 kg (384 lb 12 8 oz)       Physical Exam:    GEN: Salud Khan appears well, alert and oriented x 3, pleasant and cooperative   HEENT: pupils equal, round, and reactive to light; extraocular muscles intact  NECK: supple, no carotid bruits   HEART: regular rhythm, normal S1 and S2, no murmurs, clicks, gallops or rubs, JVP is    LUNGS: clear to auscultation bilaterally; no wheezes, rales, or rhonchi   ABDOMEN: normal bowel sounds, soft, no tenderness, no distention  EXTREMITIES: peripheral pulses normal; no clubbing, cyanosis, or edema  NEURO: no focal findings   SKIN: normal without suspicious lesions on exposed skin    Labs & Results:    Lab Results   Component Value Date    GLUCOSE 107 06/26/2015    CALCIUM 9 2 06/21/2018     06/21/2018    K 4 4 06/21/2018    CO2 31 06/21/2018     06/21/2018    BUN 26 (H) 06/21/2018    CREATININE 1 54 (H) 06/21/2018     Lab Results   Component Value Date    WBC 10 29 (H) 02/24/2018    HGB 14 6 02/24/2018    HCT 45 0 02/24/2018    MCV 89 02/24/2018     02/24/2018     Lab Results   Component Value Date    NTBNP 1,748 (H) 06/29/2017      Lab Results   Component Value Date    CHOL 149 12/28/2014     Lab Results   Component Value Date    HDL 35 (L) 02/24/2018    HDL 43 06/30/2017    HDL 43 12/28/2014     Lab Results   Component Value Date    LDLCALC 121 (H) 02/24/2018    LDLCALC 103 (H) 06/30/2017    LDLCALC 88 12/28/2014     Lab Results   Component Value Date    TRIG 136 02/24/2018    TRIG 87 06/30/2017    TRIG 88 12/28/2014     No components found for: CHOLHDL    EKG personally reviewed by Dianelys Hernandez DO  Counseling / Coordination of Care  Total floor / unit time spent today 25 minutes  Greater than 50% of total time was spent with the patient and / or family counseling and / or coordination of care  A description of the counseling / coordination of care: 15      Thank you for the opportunity to participate in the care of this patient    MARIETTA BLOOD 29 Shivani Mireles

## 2018-10-15 NOTE — PATIENT INSTRUCTIONS
Changing lasix (furosemide) to torsemide 40 mg (2 pills twice daily) twice daily    Increase potassium to 40 meq daily    Check BMP (kidney function and potassium ) in one week    Please check daily weights and contact the heart failure program at  if you gain 3 lb in one day or 5 lb in one week  Please limit daily sodium intake to 2000 mg daily  Please limit daily fluid intake to 2000 ml daily  Bring complete list of medications to your follow up appointment

## 2018-10-30 ENCOUNTER — APPOINTMENT (OUTPATIENT)
Dept: LAB | Facility: CLINIC | Age: 53
End: 2018-10-30
Payer: MEDICARE

## 2018-10-30 DIAGNOSIS — I42.8 NICM (NONISCHEMIC CARDIOMYOPATHY) (HCC): ICD-10-CM

## 2018-10-30 DIAGNOSIS — I50.22 CHRONIC SYSTOLIC CHF (CONGESTIVE HEART FAILURE), NYHA CLASS 2 (HCC): ICD-10-CM

## 2018-10-30 LAB
ANION GAP SERPL CALCULATED.3IONS-SCNC: 8 MMOL/L (ref 4–13)
BUN SERPL-MCNC: 29 MG/DL (ref 5–25)
CALCIUM SERPL-MCNC: 9.2 MG/DL (ref 8.3–10.1)
CHLORIDE SERPL-SCNC: 101 MMOL/L (ref 100–108)
CO2 SERPL-SCNC: 32 MMOL/L (ref 21–32)
CREAT SERPL-MCNC: 1.53 MG/DL (ref 0.6–1.3)
GFR SERPL CREATININE-BSD FRML MDRD: 52 ML/MIN/1.73SQ M
GLUCOSE P FAST SERPL-MCNC: 98 MG/DL (ref 65–99)
POTASSIUM SERPL-SCNC: 3.8 MMOL/L (ref 3.5–5.3)
SODIUM SERPL-SCNC: 141 MMOL/L (ref 136–145)

## 2018-10-30 PROCEDURE — 80048 BASIC METABOLIC PNL TOTAL CA: CPT | Performed by: INTERNAL MEDICINE

## 2018-10-30 PROCEDURE — 36415 COLL VENOUS BLD VENIPUNCTURE: CPT | Performed by: INTERNAL MEDICINE

## 2019-01-11 DIAGNOSIS — I42.8 NICM (NONISCHEMIC CARDIOMYOPATHY) (HCC): ICD-10-CM

## 2019-01-11 DIAGNOSIS — I50.22 CHRONIC SYSTOLIC CHF (CONGESTIVE HEART FAILURE), NYHA CLASS 2 (HCC): ICD-10-CM

## 2019-01-13 RX ORDER — TORSEMIDE 20 MG/1
TABLET ORAL
Qty: 120 TABLET | Refills: 2 | Status: SHIPPED | OUTPATIENT
Start: 2019-01-13 | End: 2019-04-11 | Stop reason: SDUPTHER

## 2019-01-14 DIAGNOSIS — I10 HTN (HYPERTENSION), BENIGN: Primary | ICD-10-CM

## 2019-01-14 RX ORDER — HYDRALAZINE HYDROCHLORIDE 25 MG/1
25 TABLET, FILM COATED ORAL EVERY 8 HOURS SCHEDULED
Qty: 90 TABLET | Refills: 6 | Status: SHIPPED | OUTPATIENT
Start: 2019-01-14 | End: 2019-01-21 | Stop reason: SDUPTHER

## 2019-01-21 DIAGNOSIS — I10 HTN (HYPERTENSION), BENIGN: ICD-10-CM

## 2019-01-21 RX ORDER — HYDRALAZINE HYDROCHLORIDE 25 MG/1
TABLET, FILM COATED ORAL
Qty: 270 TABLET | Refills: 3 | Status: SHIPPED | OUTPATIENT
Start: 2019-01-21 | End: 2019-09-12 | Stop reason: SDUPTHER

## 2019-01-31 NOTE — PROGRESS NOTES
Heart Failure Outpatient Progress Note - Mara Perez 48 y o  male MRN: [id number]    @ Encounter: [id number]      Assessment/Plan:    Patient Active Problem List    Diagnosis Date Noted    Acute on chronic combined systolic and diastolic congestive heart failure ([facility] ) 06/29/2017    Sepsis ([place] [id number] ) 06/29/2017    Urinary tract infection 06/29/2017    Essential hypertension 06/29/2017    Morbid obesity due to excess calories ([place] [address] ) 06/29/2017    VIRA (acute kidney injury) ([place] [address] ) 06/29/2017     1  Chronic systolic Heart failure, Stage C, NYHA 2  Etiology: NICM- obesity, HTN  Weight: 392 lbs, prior 386 lbs , up from 373 lbs    Echocardiogram 11/16/17  LVEF: 35%  LVIDd: 6 5  RV: dilated  MR:  PASP:    Neurohormonal Blockade:  --Beta-Blocker: coreg 12 5 mg BID  --ACEi, ARB or ARNi: hydralazine 25 mg TID, Imdur 30 mg daily    (or SVR reduction)  --Aldosterone Receptor Blocker: gynecomastia from spironolactone and inspra so off of them now  --Diuretic: lasix 40 mg BID, K 20 meq daily    Sudden Cardiac Death Risk Reduction:  --ICD: recommended; too big for lifevest  Interrogation:     Electrical Resynchronization:  --narrow QRS  Interrogation:     Advanced Therapies (If appropriate): --Inotrope:  --LVAD/Transplant Candidacy:    # Morbid obesity- BMI 67, recommend gastric bypass eval  # HTN  # KYRIE- BiPAP  # CKD, Cr 1 54 on 6/21 labs    TODAY'S PLAN:  I explained to the patient that prophylactic ICD is recommended per ACC/AHA/HRS guidelines given EF is < 35% for greater than 3 months from time of initial diagnosis despite optimized medical therapy  ICD implantation is performed for the primary goal or reducing arrhythmogenic and total mortality as evidenced in multiple trials  When applicable, per guideline recommendations for CRT therapy, a BIV device will be implanted to provide ICD therapy as well as resynchronization to optimize LV myocardial synchrony and cardiac output   In this circumstance the patient was explained that not all patients will achieve desired response (improved functional capacity, NYHA class, decreased LVESd on echo)  'Non responder rate can approach 40% based on patients particular type of cardiomyopathy, level and location of scar and amount of electrical dyssynchrony  Consent for the procedure including procedural risks will be obtained by implanting electrophysiologist    He has refused ICD today  I instructed him to try to avoid Diclofenac  He is drinking way too much- try to limit to 2 liters a day  Needs better HF outpt management    I recommend gastric bypass surgery eval as his weight is life threatening to him, his BMI 67    --2g sodium diet  - Daily weights    HPI:   47 yo male presents for follow up after hospitalization for acute systolic heart failure  He follows with Dr Alfonso Marc for BiV heart failure, obesity and KYRIE  He states he did not have medical insurance for 2 5 years and then got Medicare, went to see his family doctor who sent him to ER due to his volume overload  He had not been on medicines  He is down 24 lbs since his admission  He reports he had a cardiac cath back around 2010 which was negative  He was discharged from the hospital on coreg 6 25 mg BID, hydralazine and imdur  His last Cr was 1 39  He was discharged on lasix 40 mg BID  HE is low sodium in his diet  He had lost 12 more lbs since his discharge  He is getting cramping  Interval History:  Refused ICD last time  Was drinking too much fluids  I changed him to torsemide 40 mg BID last time  Weight up even further    Past Medical History:   Diagnosis Date    CHF (congestive heart failure) (HCC)     Hx of cardiac cath     Hypertension     KYRIE treated with BiPAP        Review of Systems - weight up 3 lbs, urinating a lot from torsemide      Allergies   Allergen Reactions    Penicillins            Current Outpatient Prescriptions:     aspirin 81 mg chewable tablet, Chew 1 tablet daily, Disp: , Rfl: 0   carvedilol (COREG) 12 5 mg tablet, TAKE 1 TABLET TWICE DAILY  , Disp: 60 tablet, Rfl: 11    diclofenac (VOLTAREN) 75 mg EC tablet, TAKE 1 TABLET (75 MG TOTAL) BY MOUTH 2 (TWO) TIMES A DAY WITH MEALS , Disp: , Rfl: 1    hydrALAZINE (APRESOLINE) 25 mg tablet, TAKE 1 TABLET BY MOUTH 3 TIMES DAILY, Disp: 270 tablet, Rfl: 3    isosorbide mononitrate (IMDUR) 30 mg 24 hr tablet, TAKE 1 TABLET DAILY, Disp: 90 tablet, Rfl: 3    Magnesium 500 MG TABS, Take 1 capsule by mouth 2 (two) times a day  , Disp: , Rfl:     Potassium Chloride ER 20 MEQ TBCR, Take 2 tablets (40 mEq total) by mouth daily, Disp: 180 tablet, Rfl: 3    torsemide (DEMADEX) 20 mg tablet, TAKE 2 TABLETS BY MOUTH TWICE A DAY, Disp: 120 tablet, Rfl: 2    VENTOLIN  (90 Base) MCG/ACT inhaler, Take 1 puff by mouth every 6 (six) hours as needed for wheezing, Disp: , Rfl: 5    Social History     Social History    Marital status: /Civil Union     Spouse name: N/A    Number of children: N/A    Years of education: N/A     Occupational History    Not on file  Social History Main Topics    Smoking status: Never Smoker    Smokeless tobacco: Never Used    Alcohol use No    Drug use: No    Sexual activity: Not on file     Other Topics Concern    Not on file     Social History Narrative    No narrative on file       No family history on file      Physical Exam:    Vitals:   Vitals:    02/01/19 1529   BP: 112/56   Pulse: 81   SpO2: 96%       Wt Readings from Last 3 Encounters:   02/01/19 (!) 178 kg (392 lb)   10/15/18 (!) 176 kg (389 lb)   06/28/18 (!) 174 kg (383 lb)       Physical Exam:    GEN: Pilar Ambriz appears well, alert and oriented x 3, pleasant and cooperative   HEENT: pupils equal, round, and reactive to light; extraocular muscles intact  NECK: supple, no carotid bruits   HEART: regular rhythm, normal S1 and S2, no murmurs, clicks, gallops or rubs, JVP is    LUNGS: clear to auscultation bilaterally; no wheezes, rales, or rhonchi   ABDOMEN: normal bowel sounds, soft, no tenderness, no distention  EXTREMITIES: peripheral pulses normal; no clubbing, cyanosis, or edema  NEURO: no focal findings   SKIN: normal without suspicious lesions on exposed skin    Labs & Results:    Lab Results   Component Value Date    GLUCOSE 107 06/26/2015    CALCIUM 9 2 10/30/2018     06/26/2015    K 3 8 10/30/2018    CO2 32 10/30/2018     10/30/2018    BUN 29 (H) 10/30/2018    CREATININE 1 53 (H) 10/30/2018     Lab Results   Component Value Date    WBC 10 29 (H) 02/24/2018    HGB 14 6 02/24/2018    HCT 45 0 02/24/2018    MCV 89 02/24/2018     02/24/2018     Lab Results   Component Value Date    NTBNP 1,748 (H) 06/29/2017      Lab Results   Component Value Date    CHOL 149 12/28/2014     Lab Results   Component Value Date    HDL 35 (L) 02/24/2018    HDL 43 06/30/2017    HDL 43 12/28/2014     Lab Results   Component Value Date    LDLCALC 121 (H) 02/24/2018    LDLCALC 103 (H) 06/30/2017    LDLCALC 88 12/28/2014     Lab Results   Component Value Date    TRIG 136 02/24/2018    TRIG 87 06/30/2017    TRIG 88 12/28/2014     No results found for: CHOLHDL    EKG personally reviewed by Jonelle Goel,   Counseling / Coordination of Care  Total floor / unit time spent today 25 minutes  Greater than 50% of total time was spent with the patient and / or family counseling and / or coordination of care  A description of the counseling / coordination of care: 15      Thank you for the opportunity to participate in the care of this patient    MARIETTA BLOOD 29 Shivani Mireles

## 2019-02-01 ENCOUNTER — OFFICE VISIT (OUTPATIENT)
Dept: CARDIOLOGY CLINIC | Facility: CLINIC | Age: 54
End: 2019-02-01
Payer: MEDICARE

## 2019-02-01 VITALS
DIASTOLIC BLOOD PRESSURE: 56 MMHG | WEIGHT: 315 LBS | HEIGHT: 64 IN | HEART RATE: 81 BPM | BODY MASS INDEX: 53.78 KG/M2 | OXYGEN SATURATION: 96 % | SYSTOLIC BLOOD PRESSURE: 112 MMHG

## 2019-02-01 DIAGNOSIS — I42.0 DILATED CARDIOMYOPATHY (HCC): ICD-10-CM

## 2019-02-01 DIAGNOSIS — I10 HTN (HYPERTENSION), BENIGN: ICD-10-CM

## 2019-02-01 DIAGNOSIS — I50.22 CHRONIC SYSTOLIC CHF (CONGESTIVE HEART FAILURE), NYHA CLASS 2 (HCC): Primary | ICD-10-CM

## 2019-02-01 DIAGNOSIS — Z99.89 OSA ON CPAP: ICD-10-CM

## 2019-02-01 DIAGNOSIS — G47.33 OSA ON CPAP: ICD-10-CM

## 2019-02-01 PROCEDURE — 99214 OFFICE O/P EST MOD 30 MIN: CPT | Performed by: INTERNAL MEDICINE

## 2019-02-01 RX ORDER — DICLOFENAC SODIUM 75 MG/1
TABLET, DELAYED RELEASE ORAL
Refills: 1 | COMMUNITY
Start: 2019-01-14

## 2019-02-16 ENCOUNTER — TRANSCRIBE ORDERS (OUTPATIENT)
Dept: LAB | Facility: CLINIC | Age: 54
End: 2019-02-16

## 2019-02-16 ENCOUNTER — APPOINTMENT (OUTPATIENT)
Dept: LAB | Facility: CLINIC | Age: 54
End: 2019-02-16
Payer: MEDICARE

## 2019-02-16 DIAGNOSIS — E66.01 MORBID OBESITY (HCC): ICD-10-CM

## 2019-02-16 DIAGNOSIS — R53.83 TIREDNESS: ICD-10-CM

## 2019-02-16 DIAGNOSIS — Z13.6 SCREENING FOR ISCHEMIC HEART DISEASE: ICD-10-CM

## 2019-02-16 DIAGNOSIS — I10 ESSENTIAL HYPERTENSION, MALIGNANT: ICD-10-CM

## 2019-02-16 DIAGNOSIS — I10 ESSENTIAL HYPERTENSION, MALIGNANT: Primary | ICD-10-CM

## 2019-02-16 LAB
ALBUMIN SERPL BCP-MCNC: 3.5 G/DL (ref 3.5–5)
ALP SERPL-CCNC: 75 U/L (ref 46–116)
ALT SERPL W P-5'-P-CCNC: 63 U/L (ref 12–78)
ANION GAP SERPL CALCULATED.3IONS-SCNC: 7 MMOL/L (ref 4–13)
AST SERPL W P-5'-P-CCNC: 31 U/L (ref 5–45)
BACTERIA UR QL AUTO: ABNORMAL /HPF
BASOPHILS # BLD AUTO: 0.11 THOUSANDS/ΜL (ref 0–0.1)
BASOPHILS NFR BLD AUTO: 1 % (ref 0–1)
BILIRUB SERPL-MCNC: 0.5 MG/DL (ref 0.2–1)
BILIRUB UR QL STRIP: NEGATIVE
BUN SERPL-MCNC: 27 MG/DL (ref 5–25)
CALCIUM SERPL-MCNC: 9.3 MG/DL (ref 8.3–10.1)
CHLORIDE SERPL-SCNC: 103 MMOL/L (ref 100–108)
CHOLEST SERPL-MCNC: 182 MG/DL (ref 50–200)
CLARITY UR: CLEAR
CO2 SERPL-SCNC: 30 MMOL/L (ref 21–32)
COLOR UR: YELLOW
CREAT SERPL-MCNC: 1.65 MG/DL (ref 0.6–1.3)
EOSINOPHIL # BLD AUTO: 0.43 THOUSAND/ΜL (ref 0–0.61)
EOSINOPHIL NFR BLD AUTO: 5 % (ref 0–6)
ERYTHROCYTE [DISTWIDTH] IN BLOOD BY AUTOMATED COUNT: 13.6 % (ref 11.6–15.1)
GFR SERPL CREATININE-BSD FRML MDRD: 47 ML/MIN/1.73SQ M
GLUCOSE P FAST SERPL-MCNC: 98 MG/DL (ref 65–99)
GLUCOSE UR STRIP-MCNC: NEGATIVE MG/DL
HCT VFR BLD AUTO: 43.6 % (ref 36.5–49.3)
HDLC SERPL-MCNC: 31 MG/DL (ref 40–60)
HGB BLD-MCNC: 14.4 G/DL (ref 12–17)
HGB UR QL STRIP.AUTO: NEGATIVE
IMM GRANULOCYTES # BLD AUTO: 0.12 THOUSAND/UL (ref 0–0.2)
IMM GRANULOCYTES NFR BLD AUTO: 2 % (ref 0–2)
KETONES UR STRIP-MCNC: NEGATIVE MG/DL
LDLC SERPL CALC-MCNC: 115 MG/DL (ref 0–100)
LEUKOCYTE ESTERASE UR QL STRIP: ABNORMAL
LYMPHOCYTES # BLD AUTO: 1.51 THOUSANDS/ΜL (ref 0.6–4.47)
LYMPHOCYTES NFR BLD AUTO: 18 % (ref 14–44)
MCH RBC QN AUTO: 31 PG (ref 26.8–34.3)
MCHC RBC AUTO-ENTMCNC: 33 G/DL (ref 31.4–37.4)
MCV RBC AUTO: 94 FL (ref 82–98)
MONOCYTES # BLD AUTO: 0.88 THOUSAND/ΜL (ref 0.17–1.22)
MONOCYTES NFR BLD AUTO: 11 % (ref 4–12)
NEUTROPHILS # BLD AUTO: 5.2 THOUSANDS/ΜL (ref 1.85–7.62)
NEUTS SEG NFR BLD AUTO: 63 % (ref 43–75)
NITRITE UR QL STRIP: NEGATIVE
NON-SQ EPI CELLS URNS QL MICRO: ABNORMAL /HPF
NONHDLC SERPL-MCNC: 151 MG/DL
NRBC BLD AUTO-RTO: 0 /100 WBCS
PH UR STRIP.AUTO: 6 [PH] (ref 4.5–8)
PLATELET # BLD AUTO: 224 THOUSANDS/UL (ref 149–390)
PMV BLD AUTO: 11.3 FL (ref 8.9–12.7)
POTASSIUM SERPL-SCNC: 4.3 MMOL/L (ref 3.5–5.3)
PROT SERPL-MCNC: 7.9 G/DL (ref 6.4–8.2)
PROT UR STRIP-MCNC: NEGATIVE MG/DL
RBC # BLD AUTO: 4.64 MILLION/UL (ref 3.88–5.62)
RBC #/AREA URNS AUTO: ABNORMAL /HPF
SODIUM SERPL-SCNC: 140 MMOL/L (ref 136–145)
SP GR UR STRIP.AUTO: 1.01 (ref 1–1.03)
TRIGL SERPL-MCNC: 178 MG/DL
TSH SERPL DL<=0.05 MIU/L-ACNC: 1.16 UIU/ML (ref 0.36–3.74)
UROBILINOGEN UR QL STRIP.AUTO: 0.2 E.U./DL
WBC # BLD AUTO: 8.25 THOUSAND/UL (ref 4.31–10.16)
WBC #/AREA URNS AUTO: ABNORMAL /HPF

## 2019-02-16 PROCEDURE — 84443 ASSAY THYROID STIM HORMONE: CPT

## 2019-02-16 PROCEDURE — 85025 COMPLETE CBC W/AUTO DIFF WBC: CPT

## 2019-02-16 PROCEDURE — 36415 COLL VENOUS BLD VENIPUNCTURE: CPT

## 2019-02-16 PROCEDURE — 80053 COMPREHEN METABOLIC PANEL: CPT

## 2019-02-16 PROCEDURE — 80061 LIPID PANEL: CPT

## 2019-02-16 PROCEDURE — 81001 URINALYSIS AUTO W/SCOPE: CPT | Performed by: FAMILY MEDICINE

## 2019-04-11 DIAGNOSIS — I42.8 NICM (NONISCHEMIC CARDIOMYOPATHY) (HCC): ICD-10-CM

## 2019-04-11 DIAGNOSIS — I50.22 CHRONIC SYSTOLIC CHF (CONGESTIVE HEART FAILURE), NYHA CLASS 2 (HCC): ICD-10-CM

## 2019-04-11 RX ORDER — TORSEMIDE 20 MG/1
TABLET ORAL
Qty: 120 TABLET | Refills: 2 | Status: SHIPPED | OUTPATIENT
Start: 2019-04-11 | End: 2019-07-06 | Stop reason: SDUPTHER

## 2019-06-03 ENCOUNTER — OFFICE VISIT (OUTPATIENT)
Dept: CARDIOLOGY CLINIC | Facility: CLINIC | Age: 54
End: 2019-06-03
Payer: MEDICARE

## 2019-06-03 VITALS
WEIGHT: 315 LBS | BODY MASS INDEX: 53.78 KG/M2 | OXYGEN SATURATION: 95 % | HEIGHT: 64 IN | HEART RATE: 74 BPM | SYSTOLIC BLOOD PRESSURE: 130 MMHG | DIASTOLIC BLOOD PRESSURE: 60 MMHG

## 2019-06-03 DIAGNOSIS — I42.8 NICM (NONISCHEMIC CARDIOMYOPATHY) (HCC): ICD-10-CM

## 2019-06-03 DIAGNOSIS — I50.22 CHRONIC SYSTOLIC CHF (CONGESTIVE HEART FAILURE), NYHA CLASS 2 (HCC): ICD-10-CM

## 2019-06-03 DIAGNOSIS — I10 HTN (HYPERTENSION), BENIGN: ICD-10-CM

## 2019-06-03 DIAGNOSIS — E66.01 MORBID OBESITY WITH BMI OF 60.0-69.9, ADULT (HCC): Primary | ICD-10-CM

## 2019-06-03 PROCEDURE — 99214 OFFICE O/P EST MOD 30 MIN: CPT | Performed by: INTERNAL MEDICINE

## 2019-06-13 ENCOUNTER — HOSPITAL ENCOUNTER (OUTPATIENT)
Dept: NON INVASIVE DIAGNOSTICS | Facility: CLINIC | Age: 54
Discharge: HOME | End: 2019-06-13
Payer: MEDICARE

## 2019-06-13 DIAGNOSIS — E66.01 MORBID OBESITY WITH BMI OF 60.0-69.9, ADULT (HCC): ICD-10-CM

## 2019-06-13 DIAGNOSIS — I42.8 NICM (NONISCHEMIC CARDIOMYOPATHY) (HCC): ICD-10-CM

## 2019-06-13 PROCEDURE — C8929 TTE W OR WO FOL WCON,DOPPLER: HCPCS

## 2019-06-13 PROCEDURE — 93306 TTE W/DOPPLER COMPLETE: CPT | Performed by: INTERNAL MEDICINE

## 2019-06-13 RX ADMIN — PERFLUTREN 1 ML/MIN: 6.52 INJECTION, SUSPENSION INTRAVENOUS at 11:40

## 2019-06-27 ENCOUNTER — OFFICE VISIT (OUTPATIENT)
Dept: SLEEP CENTER | Facility: CLINIC | Age: 54
End: 2019-06-27
Payer: MEDICARE

## 2019-06-27 VITALS
SYSTOLIC BLOOD PRESSURE: 122 MMHG | DIASTOLIC BLOOD PRESSURE: 68 MMHG | BODY MASS INDEX: 52.48 KG/M2 | WEIGHT: 315 LBS | HEIGHT: 65 IN

## 2019-06-27 DIAGNOSIS — G47.33 OSA (OBSTRUCTIVE SLEEP APNEA): Primary | ICD-10-CM

## 2019-06-27 PROCEDURE — 99213 OFFICE O/P EST LOW 20 MIN: CPT | Performed by: INTERNAL MEDICINE

## 2019-07-06 DIAGNOSIS — I42.8 NICM (NONISCHEMIC CARDIOMYOPATHY) (HCC): ICD-10-CM

## 2019-07-06 DIAGNOSIS — I50.22 CHRONIC SYSTOLIC CHF (CONGESTIVE HEART FAILURE), NYHA CLASS 2 (HCC): ICD-10-CM

## 2019-07-08 DIAGNOSIS — I50.22 CHRONIC SYSTOLIC CHF (CONGESTIVE HEART FAILURE), NYHA CLASS 2 (HCC): ICD-10-CM

## 2019-07-08 RX ORDER — ISOSORBIDE MONONITRATE 30 MG/1
TABLET, EXTENDED RELEASE ORAL
Qty: 90 TABLET | Refills: 3 | Status: SHIPPED | OUTPATIENT
Start: 2019-07-08 | End: 2019-09-30 | Stop reason: SDUPTHER

## 2019-07-08 RX ORDER — ISOSORBIDE MONONITRATE 30 MG/1
30 TABLET, EXTENDED RELEASE ORAL DAILY
Qty: 30 TABLET | Refills: 6 | Status: SHIPPED | OUTPATIENT
Start: 2019-07-08 | End: 2020-07-20

## 2019-07-08 RX ORDER — TORSEMIDE 20 MG/1
40 TABLET ORAL 2 TIMES DAILY
Qty: 120 TABLET | Refills: 6 | Status: SHIPPED | OUTPATIENT
Start: 2019-07-08 | End: 2020-02-18 | Stop reason: SDUPTHER

## 2019-08-03 DIAGNOSIS — I50.22 CHRONIC SYSTOLIC CHF (CONGESTIVE HEART FAILURE), NYHA CLASS 2 (HCC): ICD-10-CM

## 2019-08-05 RX ORDER — CARVEDILOL 12.5 MG/1
TABLET ORAL
Qty: 60 TABLET | Refills: 11 | Status: SHIPPED | OUTPATIENT
Start: 2019-08-05 | End: 2019-09-12 | Stop reason: SDUPTHER

## 2019-09-12 DIAGNOSIS — I10 HTN (HYPERTENSION), BENIGN: ICD-10-CM

## 2019-09-12 DIAGNOSIS — I50.22 CHRONIC SYSTOLIC CHF (CONGESTIVE HEART FAILURE), NYHA CLASS 2 (HCC): ICD-10-CM

## 2019-09-12 RX ORDER — HYDRALAZINE HYDROCHLORIDE 25 MG/1
25 TABLET, FILM COATED ORAL 3 TIMES DAILY
Qty: 90 TABLET | Refills: 5 | Status: SHIPPED | OUTPATIENT
Start: 2019-09-12 | End: 2020-06-09

## 2019-09-12 RX ORDER — CARVEDILOL 12.5 MG/1
12.5 TABLET ORAL 2 TIMES DAILY
Qty: 60 TABLET | Refills: 5 | Status: SHIPPED | OUTPATIENT
Start: 2019-09-12 | End: 2020-06-09

## 2019-09-21 ENCOUNTER — APPOINTMENT (OUTPATIENT)
Dept: LAB | Facility: CLINIC | Age: 54
End: 2019-09-21
Payer: MEDICARE

## 2019-09-21 ENCOUNTER — TRANSCRIBE ORDERS (OUTPATIENT)
Dept: LAB | Facility: CLINIC | Age: 54
End: 2019-09-21

## 2019-09-21 DIAGNOSIS — Z11.4 SCREENING FOR HUMAN IMMUNODEFICIENCY VIRUS: ICD-10-CM

## 2019-09-21 DIAGNOSIS — Z13.6 SCREENING FOR ISCHEMIC HEART DISEASE: ICD-10-CM

## 2019-09-21 DIAGNOSIS — Z12.11 SPECIAL SCREENING FOR MALIGNANT NEOPLASMS, COLON: ICD-10-CM

## 2019-09-21 DIAGNOSIS — Z12.11 SPECIAL SCREENING FOR MALIGNANT NEOPLASMS, COLON: Primary | ICD-10-CM

## 2019-09-21 DIAGNOSIS — R53.83 FATIGUE, UNSPECIFIED TYPE: ICD-10-CM

## 2019-09-21 DIAGNOSIS — I10 ESSENTIAL HYPERTENSION, MALIGNANT: ICD-10-CM

## 2019-09-21 LAB
ALBUMIN SERPL BCP-MCNC: 3.3 G/DL (ref 3.5–5)
ALP SERPL-CCNC: 80 U/L (ref 46–116)
ALT SERPL W P-5'-P-CCNC: 53 U/L (ref 12–78)
ANION GAP SERPL CALCULATED.3IONS-SCNC: 4 MMOL/L (ref 4–13)
AST SERPL W P-5'-P-CCNC: 29 U/L (ref 5–45)
BACTERIA UR QL AUTO: ABNORMAL /HPF
BASOPHILS # BLD AUTO: 0.11 THOUSANDS/ΜL (ref 0–0.1)
BASOPHILS NFR BLD AUTO: 1 % (ref 0–1)
BILIRUB SERPL-MCNC: 0.4 MG/DL (ref 0.2–1)
BILIRUB UR QL STRIP: NEGATIVE
BUN SERPL-MCNC: 18 MG/DL (ref 5–25)
CALCIUM SERPL-MCNC: 9.2 MG/DL (ref 8.3–10.1)
CHLORIDE SERPL-SCNC: 103 MMOL/L (ref 100–108)
CHOLEST SERPL-MCNC: 188 MG/DL (ref 50–200)
CLARITY UR: CLEAR
CO2 SERPL-SCNC: 32 MMOL/L (ref 21–32)
COLOR UR: YELLOW
CREAT SERPL-MCNC: 1.44 MG/DL (ref 0.6–1.3)
EOSINOPHIL # BLD AUTO: 0.42 THOUSAND/ΜL (ref 0–0.61)
EOSINOPHIL NFR BLD AUTO: 5 % (ref 0–6)
ERYTHROCYTE [DISTWIDTH] IN BLOOD BY AUTOMATED COUNT: 13.4 % (ref 11.6–15.1)
GFR SERPL CREATININE-BSD FRML MDRD: 55 ML/MIN/1.73SQ M
GLUCOSE P FAST SERPL-MCNC: 111 MG/DL (ref 65–99)
GLUCOSE UR STRIP-MCNC: NEGATIVE MG/DL
HCT VFR BLD AUTO: 44.4 % (ref 36.5–49.3)
HDLC SERPL-MCNC: 32 MG/DL (ref 40–60)
HGB BLD-MCNC: 14.2 G/DL (ref 12–17)
HGB UR QL STRIP.AUTO: NEGATIVE
IMM GRANULOCYTES # BLD AUTO: 0.13 THOUSAND/UL (ref 0–0.2)
IMM GRANULOCYTES NFR BLD AUTO: 1 % (ref 0–2)
KETONES UR STRIP-MCNC: NEGATIVE MG/DL
LDLC SERPL CALC-MCNC: 125 MG/DL (ref 0–100)
LEUKOCYTE ESTERASE UR QL STRIP: ABNORMAL
LYMPHOCYTES # BLD AUTO: 2.24 THOUSANDS/ΜL (ref 0.6–4.47)
LYMPHOCYTES NFR BLD AUTO: 24 % (ref 14–44)
MCH RBC QN AUTO: 30.1 PG (ref 26.8–34.3)
MCHC RBC AUTO-ENTMCNC: 32 G/DL (ref 31.4–37.4)
MCV RBC AUTO: 94 FL (ref 82–98)
MONOCYTES # BLD AUTO: 0.82 THOUSAND/ΜL (ref 0.17–1.22)
MONOCYTES NFR BLD AUTO: 9 % (ref 4–12)
NEUTROPHILS # BLD AUTO: 5.49 THOUSANDS/ΜL (ref 1.85–7.62)
NEUTS SEG NFR BLD AUTO: 60 % (ref 43–75)
NITRITE UR QL STRIP: NEGATIVE
NON-SQ EPI CELLS URNS QL MICRO: ABNORMAL /HPF
NONHDLC SERPL-MCNC: 156 MG/DL
NRBC BLD AUTO-RTO: 0 /100 WBCS
PH UR STRIP.AUTO: 5.5 [PH]
PLATELET # BLD AUTO: 221 THOUSANDS/UL (ref 149–390)
PMV BLD AUTO: 10.2 FL (ref 8.9–12.7)
POTASSIUM SERPL-SCNC: 4 MMOL/L (ref 3.5–5.3)
PROT SERPL-MCNC: 7.9 G/DL (ref 6.4–8.2)
PROT UR STRIP-MCNC: NEGATIVE MG/DL
RBC # BLD AUTO: 4.72 MILLION/UL (ref 3.88–5.62)
RBC #/AREA URNS AUTO: ABNORMAL /HPF
SODIUM SERPL-SCNC: 139 MMOL/L (ref 136–145)
SP GR UR STRIP.AUTO: 1.02 (ref 1–1.03)
TRIGL SERPL-MCNC: 156 MG/DL
TSH SERPL DL<=0.05 MIU/L-ACNC: 1.97 UIU/ML (ref 0.36–3.74)
UROBILINOGEN UR QL STRIP.AUTO: 0.2 E.U./DL
WBC # BLD AUTO: 9.21 THOUSAND/UL (ref 4.31–10.16)
WBC #/AREA URNS AUTO: ABNORMAL /HPF

## 2019-09-21 PROCEDURE — 81001 URINALYSIS AUTO W/SCOPE: CPT | Performed by: FAMILY MEDICINE

## 2019-09-21 PROCEDURE — 36415 COLL VENOUS BLD VENIPUNCTURE: CPT

## 2019-09-21 PROCEDURE — 80061 LIPID PANEL: CPT

## 2019-09-21 PROCEDURE — 84443 ASSAY THYROID STIM HORMONE: CPT

## 2019-09-21 PROCEDURE — 85025 COMPLETE CBC W/AUTO DIFF WBC: CPT

## 2019-09-21 PROCEDURE — 87389 HIV-1 AG W/HIV-1&-2 AB AG IA: CPT

## 2019-09-21 PROCEDURE — 80053 COMPREHEN METABOLIC PANEL: CPT

## 2019-09-21 PROCEDURE — G0328 FECAL BLOOD SCRN IMMUNOASSAY: HCPCS

## 2019-09-22 LAB — HIV 1+2 AB+HIV1 P24 AG SERPL QL IA: NORMAL

## 2019-09-25 LAB — MISCELLANEOUS LAB TEST RESULT: NORMAL

## 2019-09-30 ENCOUNTER — OFFICE VISIT (OUTPATIENT)
Dept: CARDIOLOGY CLINIC | Facility: CLINIC | Age: 54
End: 2019-09-30
Payer: MEDICARE

## 2019-09-30 VITALS
HEIGHT: 65 IN | DIASTOLIC BLOOD PRESSURE: 60 MMHG | HEART RATE: 77 BPM | SYSTOLIC BLOOD PRESSURE: 110 MMHG | OXYGEN SATURATION: 95 % | WEIGHT: 315 LBS | BODY MASS INDEX: 52.48 KG/M2

## 2019-09-30 DIAGNOSIS — I50.22 CHRONIC SYSTOLIC CHF (CONGESTIVE HEART FAILURE), NYHA CLASS 2 (HCC): Primary | ICD-10-CM

## 2019-09-30 DIAGNOSIS — I10 HTN (HYPERTENSION), BENIGN: ICD-10-CM

## 2019-09-30 DIAGNOSIS — E66.01 MORBID OBESITY WITH BMI OF 50.0-59.9, ADULT (HCC): ICD-10-CM

## 2019-09-30 DIAGNOSIS — I42.8 NICM (NONISCHEMIC CARDIOMYOPATHY) (HCC): ICD-10-CM

## 2019-09-30 PROCEDURE — 99214 OFFICE O/P EST MOD 30 MIN: CPT | Performed by: INTERNAL MEDICINE

## 2019-09-30 RX ORDER — FLUTICASONE PROPIONATE 50 MCG
1 SPRAY, SUSPENSION (ML) NASAL DAILY
COMMUNITY

## 2019-09-30 NOTE — PROGRESS NOTES
Heart Failure Outpatient Progress Note - Ewelina Gutierrez 48 y o  male MRN: [id number]    @ Encounter: [id number]      Assessment/Plan:    Patient Active Problem List    Diagnosis Date Noted    Acute on chronic combined systolic and diastolic congestive heart failure ([facility] ) 06/29/2017    Sepsis ([facility] ) 06/29/2017    Urinary tract infection 06/29/2017    Essential hypertension 06/29/2017    Morbid obesity due to excess calories ([facility] ) 06/29/2017    VIRA (acute kidney injury) ([place] [address] ) 06/29/2017     1  Chronic systolic Heart failure, Stage C, NYHA 2  Etiology: NICM- obesity, HTN    Weight: 387 lbs, prior 386 lbs , up from 373 lbs    Echo 6/13/19:  LVEF: 50%, grade 2 DD  LVIDd: 6 2 cm  RV: normal  MR: mild    Echocardiogram 11/16/17  LVEF: 35%  LVIDd: 6 5  RV: dilated    Echo June 2017  LVEF: 35%    Neurohormonal Blockade:  --Beta-Blocker: coreg 12 5 mg BID  --ACEi, ARB or ARNi: hydralazine 25 mg TID, Imdur 30 mg daily    (or SVR reduction)  --Aldosterone Receptor Blocker: gynecomastia from spironolactone and inspra so off of them now  --Diuretic: lasix 40 mg BID, K 20 meq daily    Sudden Cardiac Death Risk Reduction:  --ICD: EF now around 50%    Electrical Resynchronization:  --narrow QRS  Interrogation:     Advanced Therapies (If appropriate): --Inotrope:  --LVAD/Transplant Candidacy:    # Morbid obesity- BMI 67, recommend gastric bypass eval  # hyperlipidemia 9/25: , HDL 32  # HTN  # KYRIE- BiPAP  # CKD, Cr 1 44 on 9/21    TODAY'S PLAN:  No change in meds  EF now 50% so not recommending ICD at this point    I recommend gastric bypass surgery eval as his weight is life threatening to him, his BMI 67    --2g sodium diet  - Daily weights    HPI:   49 yo male presents for follow up after hospitalization for acute systolic heart failure  He follows with Dr Carlito Xie for BiV heart failure, obesity and KYRIE   He states he did not have medical insurance for 2 5 years and then got Medicare, went to see his family doctor who sent him to ER due to his volume overload  He had not been on medicines  He is down 24 lbs since his admission  He reports he had a cardiac cath back around 2010 which was negative  He was discharged from the hospital on coreg 6 25 mg BID, hydralazine and imdur  His last Cr was 1 39  He was discharged on lasix 40 mg BID  HE is low sodium in his diet  He had lost 12 more lbs since his discharge  He is getting cramping  On my last follow up his weight was up more as drinking too much so I educated about amount to drink  Also discussed gastric bypass as BMI was 67  Also continued to recommend ICD for primary prevention  Interval History:  I instructed him to try to avoid Diclofenac  He is drinking way too much- try to limit to 2 liters a day  Needs better HF outpt management    I recommended gastric bypass surgery eval as his weight is life threatening to him, his BMI 67    Echo 6/13/19:  LVEF: 50%, grade 2 DD  LVIDd: 6 2 cm  RV: normal  MR: mild    Review of Systems   Constitutional: Negative for activity change, appetite change, fatigue and unexpected weight change  HENT: Negative for congestion and nosebleeds  Eyes: Negative  Respiratory: Negative for cough, chest tightness and shortness of breath  Cardiovascular: Negative for chest pain, palpitations and leg swelling  Gastrointestinal: Negative for abdominal distention  Endocrine: Negative  Genitourinary: Negative  Musculoskeletal: Negative  Skin: Negative  Neurological: Negative for dizziness, syncope and weakness  Hematological: Negative  Psychiatric/Behavioral: Negative  Past Medical History:   Diagnosis Date    CHF (congestive heart failure) (HCC)     Hx of cardiac cath     Hypertension     KYRIE treated with BiPAP          Allergies   Allergen Reactions    Penicillins            Current Outpatient Medications:     aspirin 81 mg chewable tablet, Chew 1 tablet daily, Disp: , Rfl: 0    carvedilol (COREG) 12 5 mg tablet, Take 1 tablet (12 5 mg total) by mouth 2 (two) times a day, Disp: 60 tablet, Rfl: 5    fluticasone (FLONASE) 50 mcg/act nasal spray, 1 spray into each nostril daily, Disp: , Rfl:     hydrALAZINE (APRESOLINE) 25 mg tablet, Take 1 tablet (25 mg total) by mouth 3 (three) times a day, Disp: 90 tablet, Rfl: 5    isosorbide mononitrate (IMDUR) 30 mg 24 hr tablet, Take 1 tablet (30 mg total) by mouth daily, Disp: 30 tablet, Rfl: 6    Magnesium 500 MG TABS, Take 1 capsule by mouth daily , Disp: , Rfl:     Potassium Chloride ER 20 MEQ TBCR, Take 2 tablets (40 mEq total) by mouth daily, Disp: 180 tablet, Rfl: 3    torsemide (DEMADEX) 20 mg tablet, Take 2 tablets (40 mg total) by mouth 2 (two) times a day, Disp: 120 tablet, Rfl: 6    diclofenac (VOLTAREN) 75 mg EC tablet, , Disp: , Rfl: 1    Social History     Socioeconomic History    Marital status: /Civil Union     Spouse name: Not on file    Number of children: Not on file    Years of education: Not on file    Highest education level: Not on file   Occupational History    Not on file   Social Needs    Financial resource strain: Not on file    Food insecurity:     Worry: Not on file     Inability: Not on file    Transportation needs:     Medical: Not on file     Non-medical: Not on file   Tobacco Use    Smoking status: Never Smoker    Smokeless tobacco: Never Used   Substance and Sexual Activity    Alcohol use: No    Drug use: No    Sexual activity: Not on file   Lifestyle    Physical activity:     Days per week: Not on file     Minutes per session: Not on file    Stress: Not on file   Relationships    Social connections:     Talks on phone: Not on file     Gets together: Not on file     Attends [employer]oration[employer] service: Not on file     Active member of club or organization: Not on file     Attends meetings of clubs or organizations: Not on file     Relationship status: Not on file    Intimate partner violence:     Fear of current or ex partner: Not on file     Emotionally abused: Not on file     Physically abused: Not on file     Forced sexual activity: Not on file   Other Topics Concern    Not on file   Social History Narrative    Not on file       No family history on file  Physical Exam:    Vitals:   Vitals:    09/30/19 1330   BP: 110/60   Pulse: 77   SpO2: 95%       Wt Readings from Last 3 Encounters:   09/30/19 (!) 175 kg (386 lb)   06/27/19 (!) 175 kg (385 lb)   06/03/19 (!) 176 kg (387 lb 3 2 oz)       Physical Exam:    Physical Exam   Constitutional: He is oriented to person, place, and time  He appears well-developed and well-nourished  HENT:   Head: Normocephalic and atraumatic  Eyes: Pupils are equal, round, and reactive to light  EOM are normal    Neck: Normal range of motion  No JVD present  Cardiovascular: Normal rate, regular rhythm and normal heart sounds  No murmur heard  Pulmonary/Chest: Effort normal and breath sounds normal  He has no rales  Abdominal: Soft  Bowel sounds are normal  He exhibits no distension  obese   Musculoskeletal: Normal range of motion  He exhibits no edema  Neurological: He is alert and oriented to person, place, and time  Skin: Skin is warm and dry  He is not diaphoretic  Psychiatric: He has a normal mood and affect         Labs & Results:    Lab Results   Component Value Date    GLUCOSE 107 06/26/2015    CALCIUM 9 2 09/21/2019     06/26/2015    K 4 0 09/21/2019    CO2 32 09/21/2019     09/21/2019    BUN 18 09/21/2019    CREATININE 1 44 (H) 09/21/2019     Lab Results   Component Value Date    WBC 9 21 09/21/2019    HGB 14 2 09/21/2019    HCT 44 4 09/21/2019    MCV 94 09/21/2019     09/21/2019     Lab Results   Component Value Date    NTBNP 1,748 (H) 06/29/2017      Lab Results   Component Value Date    CHOL 149 12/28/2014     Lab Results   Component Value Date    HDL 32 (L) 09/21/2019    HDL 31 (L) 02/16/2019    HDL 35 (L) 02/24/2018     Lab Results   Component Value Date    LDLCALC 125 (H) 09/21/2019    LDLCALC 115 (H) 02/16/2019    LDLCALC 121 (H) 02/24/2018     Lab Results   Component Value Date    TRIG 156 (H) 09/21/2019    TRIG 178 (H) 02/16/2019    TRIG 136 02/24/2018     No results found for: CHOLHDL    EKG personally reviewed by Petra Diez DO  Counseling / Coordination of Care  Total floor / unit time spent today 25 minutes  Greater than 50% of total time was spent with the patient and / or family counseling and / or coordination of care  A description of the counseling / coordination of care: 15      Thank you for the opportunity to participate in the care of this patient    MARIETTA BLOOD 29 Shivani Mireles

## 2019-10-06 DIAGNOSIS — I42.8 NICM (NONISCHEMIC CARDIOMYOPATHY) (HCC): ICD-10-CM

## 2019-10-06 DIAGNOSIS — I50.22 CHRONIC SYSTOLIC CHF (CONGESTIVE HEART FAILURE), NYHA CLASS 2 (HCC): ICD-10-CM

## 2019-10-06 DIAGNOSIS — I50.42 CHRONIC COMBINED SYSTOLIC AND DIASTOLIC CHF, NYHA CLASS 2 (HCC): ICD-10-CM

## 2019-10-06 RX ORDER — POTASSIUM CHLORIDE 1500 MG/1
TABLET, EXTENDED RELEASE ORAL
Qty: 180 TABLET | Refills: 3 | Status: SHIPPED | OUTPATIENT
Start: 2019-10-06 | End: 2020-10-20

## 2020-01-31 NOTE — PROGRESS NOTES
Heart Failure Outpatient Progress Note - Erika Smith 47 y o  male MRN: [id number]    @ Encounter: [id number]      Assessment/Plan:    Patient Active Problem List    Diagnosis Date Noted    Acute on chronic combined systolic and diastolic congestive heart failure ([facility] ) 06/29/2017    Sepsis ([facility] ) 06/29/2017    Urinary tract infection 06/29/2017    Essential hypertension 06/29/2017    Morbid obesity due to excess calories ([place] [address] ) 06/29/2017    VIRA (acute kidney injury) ([place] [address] ) 06/29/2017     1  Chronic systolic Heart failure, Stage C, NYHA 2  Etiology: NICM- obesity, HTN    Weight: 398 lbs, prior 386 lbs , up from 373 lbs    Echo 6/13/19:  LVEF: 50%, grade 2 DD  LVIDd: 6 2 cm  RV: normal  MR: mild    Echocardiogram 11/16/17  LVEF: 35%  LVIDd: 6 5  RV: dilated    Echo June 2017  LVEF: 35%    Neurohormonal Blockade:  --Beta-Blocker: coreg 12 5 mg BID  --ACEi, ARB or ARNi: hydralazine 25 mg TID, Imdur 30 mg daily    (or SVR reduction)  --Aldosterone Receptor Blocker: gynecomastia from spironolactone and inspra so off of them now  --Diuretic: torsemide 40 mg BID, K 20 meq daily    Sudden Cardiac Death Risk Reduction:  --ICD: EF now around 50%    Electrical Resynchronization:  --narrow QRS  Interrogation:     Advanced Therapies (If appropriate): --Inotrope:  --LVAD/Transplant Candidacy:    # Morbid obesity- BMI 67, recommend gastric bypass eval  # hyperlipidemia 9/25: , HDL 32  # HTN  # KYRIE- BiPAP  # CKD, Cr 1 44 on 9/21    TODAY'S PLAN:  labs  I recommend gastric bypass surgery eval as his weight is life threatening to him, his BMI 67  His meds are working it just that he has trouble managing his volume  Going to try 80 mg once daily instead of 40 mg twice daily so maybe he can sleep better  --2g sodium diet  - Daily weights    HPI:   48 yo male presents for follow up of systolic heart failure  He follows with Dr Alexei Benítez for BiV heart failure, obesity and KYRIE   He states he did not have medical insurance for 2 5 years and then got Medicare, went to see his family doctor who sent him to ER due to his volume overload  He had not been on medicines  He is down 24 lbs since his admission  He reports he had a cardiac cath back around 2010 which was negative  He was discharged from the hospital on coreg 6 25 mg BID, hydralazine and imdur  His last Cr was 1 39  He was discharged on lasix 40 mg BID  HE is low sodium in his diet  He had lost 12 more lbs since his discharge  He is getting cramping  On my last follow up his weight was up more as drinking too much so I educated about amount to drink  Also discussed gastric bypass as BMI was 67  Also continued to recommend ICD for primary prevention  Interval History:  Often won't take diuretic as it prevents him from getting out- especially on weekends      Review of Systems   Constitutional: Negative for activity change, appetite change, fatigue and unexpected weight change  HENT: Negative for congestion and nosebleeds  Eyes: Negative  Respiratory: Negative for cough, chest tightness and shortness of breath  Cardiovascular: Negative for chest pain, palpitations and leg swelling  Gastrointestinal: Negative for abdominal distention  Endocrine: Negative  Genitourinary: Negative  Musculoskeletal: Negative  Skin: Negative  Neurological: Negative for dizziness, syncope and weakness  Hematological: Negative  Psychiatric/Behavioral: Negative  Past Medical History:   Diagnosis Date    CHF (congestive heart failure) (HCC)     Hx of cardiac cath     Hypertension     KYRIE treated with BiPAP          Allergies   Allergen Reactions    Penicillins            Current Outpatient Medications:     aspirin 81 mg chewable tablet, Chew 1 tablet daily, Disp: , Rfl: 0    carvedilol (COREG) 12 5 mg tablet, Take 1 tablet (12 5 mg total) by mouth 2 (two) times a day, Disp: 60 tablet, Rfl: 5    diclofenac (VOLTAREN) 75 mg EC tablet, , Disp: , Rfl: 1    fluticasone (FLONASE) 50 mcg/act nasal spray, 1 spray into each nostril daily, Disp: , Rfl:     hydrALAZINE (APRESOLINE) 25 mg tablet, Take 1 tablet (25 mg total) by mouth 3 (three) times a day, Disp: 90 tablet, Rfl: 5    isosorbide mononitrate (IMDUR) 30 mg 24 hr tablet, Take 1 tablet (30 mg total) by mouth daily, Disp: 30 tablet, Rfl: 6    KLOR-CON M20 20 MEQ tablet, TAKE 2 TABLETS BY MOUTH EVERY DAY, Disp: 180 tablet, Rfl: 3    Magnesium 500 MG TABS, Take 1 capsule by mouth daily , Disp: , Rfl:     torsemide (DEMADEX) 20 mg tablet, Take 2 tablets (40 mg total) by mouth 2 (two) times a day, Disp: 120 tablet, Rfl: 6    Social History     Socioeconomic History    Marital status: /Civil Union     Spouse name: Not on file    Number of children: Not on file    Years of education: Not on file    Highest education level: Not on file   Occupational History    Not on file   Social Needs    Financial resource strain: Not on file    Food insecurity:     Worry: Not on file     Inability: Not on file    Transportation needs:     Medical: Not on file     Non-medical: Not on file   Tobacco Use    Smoking status: Never Smoker    Smokeless tobacco: Never Used   Substance and Sexual Activity    Alcohol use: No    Drug use: No    Sexual activity: Not on file   Lifestyle    Physical activity:     Days per week: Not on file     Minutes per session: Not on file    Stress: Not on file   Relationships    Social connections:     Talks on phone: Not on file     Gets together: Not on file     Attends Jehovah's witness service: Not on file     Active member of club or organization: Not on file     Attends meetings of clubs or organizations: Not on file     Relationship status: Not on file    Intimate partner violence:     Fear of current or ex partner: Not on file     Emotionally abused: Not on file     Physically abused: Not on file     Forced sexual activity: Not on file   Other Topics Concern    Not on file Social History Narrative    Not on file       No family history on file  Physical Exam:    Vitals:   Vitals:    02/03/20 1544   BP: 138/68   Pulse: 72   SpO2: 96%       Wt Readings from Last 3 Encounters:   02/03/20 (!) 181 kg (398 lb)   09/30/19 (!) 175 kg (386 lb)   06/27/19 (!) 175 kg (385 lb)       Physical Exam   Constitutional: He is oriented to person, place, and time  He appears well-developed and well-nourished  HENT:   Head: Normocephalic and atraumatic  Eyes: Pupils are equal, round, and reactive to light  EOM are normal    Neck: Normal range of motion  No JVD present  Cardiovascular: Normal rate, regular rhythm and normal heart sounds  No murmur heard  Pulmonary/Chest: Effort normal and breath sounds normal  He has no rales  Abdominal: Soft  Bowel sounds are normal  He exhibits no distension  obese   Musculoskeletal: Normal range of motion  He exhibits no edema  Neurological: He is alert and oriented to person, place, and time  Skin: Skin is warm and dry  He is not diaphoretic  Psychiatric: He has a normal mood and affect       Labs & Results:    Lab Results   Component Value Date    GLUCOSE 107 06/26/2015    CALCIUM 9 2 09/21/2019     06/26/2015    K 4 0 09/21/2019    CO2 32 09/21/2019     09/21/2019    BUN 18 09/21/2019    CREATININE 1 44 (H) 09/21/2019     Lab Results   Component Value Date    WBC 9 21 09/21/2019    HGB 14 2 09/21/2019    HCT 44 4 09/21/2019    MCV 94 09/21/2019     09/21/2019     Lab Results   Component Value Date    NTBNP 1,748 (H) 06/29/2017      Lab Results   Component Value Date    CHOL 149 12/28/2014     Lab Results   Component Value Date    HDL 32 (L) 09/21/2019    HDL 31 (L) 02/16/2019    HDL 35 (L) 02/24/2018     Lab Results   Component Value Date    LDLCALC 125 (H) 09/21/2019    LDLCALC 115 (H) 02/16/2019    LDLCALC 121 (H) 02/24/2018     Lab Results   Component Value Date    TRIG 156 (H) 09/21/2019    TRIG 178 (H) 02/16/2019 TRIG 136 02/24/2018     No results found for: [place], [place]    EKG personally reviewed by Erasmo Nazario DO  Counseling / Coordination of Care  Total floor / unit time spent today 25 minutes  Greater than 50% of total time was spent with the patient and / or family counseling and / or coordination of care  A description of the counseling / coordination of care: 15      Thank you for the opportunity to participate in the care of this patient    MARIET[facility] BLOOD 29 Shivani Mireles

## 2020-02-03 ENCOUNTER — OFFICE VISIT (OUTPATIENT)
Dept: CARDIOLOGY CLINIC | Facility: CLINIC | Age: 55
End: 2020-02-03
Payer: MEDICARE

## 2020-02-03 VITALS
HEIGHT: 65 IN | SYSTOLIC BLOOD PRESSURE: 138 MMHG | OXYGEN SATURATION: 96 % | HEART RATE: 72 BPM | DIASTOLIC BLOOD PRESSURE: 68 MMHG | BODY MASS INDEX: 52.48 KG/M2 | WEIGHT: 315 LBS

## 2020-02-03 DIAGNOSIS — G47.33 OSA ON CPAP: ICD-10-CM

## 2020-02-03 DIAGNOSIS — I42.8 NICM (NONISCHEMIC CARDIOMYOPATHY) (HCC): ICD-10-CM

## 2020-02-03 DIAGNOSIS — E66.01 MORBID OBESITY WITH BMI OF 60.0-69.9, ADULT (HCC): ICD-10-CM

## 2020-02-03 DIAGNOSIS — Z99.89 OSA ON CPAP: ICD-10-CM

## 2020-02-03 DIAGNOSIS — I10 HTN (HYPERTENSION), BENIGN: Primary | ICD-10-CM

## 2020-02-03 PROCEDURE — 99214 OFFICE O/P EST MOD 30 MIN: CPT | Performed by: INTERNAL MEDICINE

## 2020-02-03 NOTE — PATIENT INSTRUCTIONS
Please check daily weights and contact the heart failure program at [phone] if you gain 3 lb in one day or 5 lb in one week  Please limit daily sodium intake to 2000 mg daily  Please limit daily fluid intake to 2000 ml daily  Bring complete list of medications to your follow up appointment         Pt should purchase a hot tub for daily use

## 2020-02-18 DIAGNOSIS — I50.22 CHRONIC SYSTOLIC CHF (CONGESTIVE HEART FAILURE), NYHA CLASS 2 (HCC): ICD-10-CM

## 2020-02-18 DIAGNOSIS — I42.8 NICM (NONISCHEMIC CARDIOMYOPATHY) (HCC): ICD-10-CM

## 2020-02-18 RX ORDER — TORSEMIDE 20 MG/1
40 TABLET ORAL 2 TIMES DAILY
Qty: 120 TABLET | Refills: 6 | Status: SHIPPED | OUTPATIENT
Start: 2020-02-18 | End: 2020-08-14

## 2020-03-07 ENCOUNTER — TRANSCRIBE ORDERS (OUTPATIENT)
Dept: LAB | Facility: CLINIC | Age: 55
End: 2020-03-07

## 2020-03-07 ENCOUNTER — APPOINTMENT (OUTPATIENT)
Dept: LAB | Facility: CLINIC | Age: 55
End: 2020-03-07
Payer: MEDICARE

## 2020-03-07 DIAGNOSIS — I10 HTN (HYPERTENSION), BENIGN: ICD-10-CM

## 2020-03-07 DIAGNOSIS — E66.01 MORBID OBESITY WITH BMI OF 60.0-69.9, ADULT (HCC): ICD-10-CM

## 2020-03-07 LAB
ANION GAP SERPL CALCULATED.3IONS-SCNC: 7 MMOL/L (ref 4–13)
BASOPHILS # BLD AUTO: 0.11 THOUSANDS/ΜL (ref 0–0.1)
BASOPHILS NFR BLD AUTO: 1 % (ref 0–1)
BUN SERPL-MCNC: 19 MG/DL (ref 5–25)
CALCIUM SERPL-MCNC: 9.1 MG/DL (ref 8.3–10.1)
CHLORIDE SERPL-SCNC: 104 MMOL/L (ref 100–108)
CO2 SERPL-SCNC: 31 MMOL/L (ref 21–32)
CREAT SERPL-MCNC: 1.28 MG/DL (ref 0.6–1.3)
EOSINOPHIL # BLD AUTO: 0.38 THOUSAND/ΜL (ref 0–0.61)
EOSINOPHIL NFR BLD AUTO: 4 % (ref 0–6)
ERYTHROCYTE [DISTWIDTH] IN BLOOD BY AUTOMATED COUNT: 13.6 % (ref 11.6–15.1)
GFR SERPL CREATININE-BSD FRML MDRD: 63 ML/MIN/1.73SQ M
GLUCOSE P FAST SERPL-MCNC: 95 MG/DL (ref 65–99)
HCT VFR BLD AUTO: 39.8 % (ref 36.5–49.3)
HGB BLD-MCNC: 14.6 G/DL (ref 12–17)
IMM GRANULOCYTES # BLD AUTO: 0.14 THOUSAND/UL (ref 0–0.2)
IMM GRANULOCYTES NFR BLD AUTO: 1 % (ref 0–2)
LYMPHOCYTES # BLD AUTO: 2.13 THOUSANDS/ΜL (ref 0.6–4.47)
LYMPHOCYTES NFR BLD AUTO: 20 % (ref 14–44)
MCH RBC QN AUTO: 34.8 PG (ref 26.8–34.3)
MCHC RBC AUTO-ENTMCNC: 36.7 G/DL (ref 31.4–37.4)
MCV RBC AUTO: 95 FL (ref 82–98)
MONOCYTES # BLD AUTO: 0.87 THOUSAND/ΜL (ref 0.17–1.22)
MONOCYTES NFR BLD AUTO: 8 % (ref 4–12)
NEUTROPHILS # BLD AUTO: 6.98 THOUSANDS/ΜL (ref 1.85–7.62)
NEUTS SEG NFR BLD AUTO: 66 % (ref 43–75)
NRBC BLD AUTO-RTO: 0 /100 WBCS
NT-PROBNP SERPL-MCNC: 348 PG/ML
PLATELET # BLD AUTO: 220 THOUSANDS/UL (ref 149–390)
PMV BLD AUTO: 11 FL (ref 8.9–12.7)
POTASSIUM SERPL-SCNC: 4.5 MMOL/L (ref 3.5–5.3)
RBC # BLD AUTO: 4.2 MILLION/UL (ref 3.88–5.62)
SODIUM SERPL-SCNC: 142 MMOL/L (ref 136–145)
WBC # BLD AUTO: 10.61 THOUSAND/UL (ref 4.31–10.16)

## 2020-03-07 PROCEDURE — 85025 COMPLETE CBC W/AUTO DIFF WBC: CPT | Performed by: INTERNAL MEDICINE

## 2020-03-07 PROCEDURE — 83880 ASSAY OF NATRIURETIC PEPTIDE: CPT

## 2020-03-07 PROCEDURE — 36415 COLL VENOUS BLD VENIPUNCTURE: CPT | Performed by: INTERNAL MEDICINE

## 2020-03-07 PROCEDURE — 80048 BASIC METABOLIC PNL TOTAL CA: CPT | Performed by: INTERNAL MEDICINE

## 2020-05-20 ENCOUNTER — TELEMEDICINE (OUTPATIENT)
Dept: CARDIOLOGY CLINIC | Facility: CLINIC | Age: 55
End: 2020-05-20
Payer: MEDICARE

## 2020-05-20 DIAGNOSIS — I50.32 CHRONIC DIASTOLIC CHF (CONGESTIVE HEART FAILURE), NYHA CLASS 2 (HCC): Primary | ICD-10-CM

## 2020-05-20 DIAGNOSIS — E66.01 MORBID OBESITY WITH BMI OF 50.0-59.9, ADULT (HCC): ICD-10-CM

## 2020-05-20 DIAGNOSIS — I10 HTN (HYPERTENSION), BENIGN: ICD-10-CM

## 2020-05-20 PROCEDURE — 99214 OFFICE O/P EST MOD 30 MIN: CPT | Performed by: INTERNAL MEDICINE

## 2020-06-09 DIAGNOSIS — I10 HTN (HYPERTENSION), BENIGN: ICD-10-CM

## 2020-06-09 DIAGNOSIS — I50.22 CHRONIC SYSTOLIC CHF (CONGESTIVE HEART FAILURE), NYHA CLASS 2 (HCC): ICD-10-CM

## 2020-06-09 RX ORDER — CARVEDILOL 12.5 MG/1
12.5 TABLET ORAL 2 TIMES DAILY
Qty: 180 TABLET | Refills: 3 | Status: SHIPPED | OUTPATIENT
Start: 2020-06-09 | End: 2021-06-17

## 2020-06-09 RX ORDER — HYDRALAZINE HYDROCHLORIDE 25 MG/1
TABLET, FILM COATED ORAL
Qty: 270 TABLET | Refills: 3 | Status: SHIPPED | OUTPATIENT
Start: 2020-06-09 | End: 2021-06-17

## 2020-06-25 ENCOUNTER — OFFICE VISIT (OUTPATIENT)
Dept: SLEEP CENTER | Facility: CLINIC | Age: 55
End: 2020-06-25
Payer: MEDICARE

## 2020-06-25 VITALS
HEIGHT: 65 IN | BODY MASS INDEX: 52.48 KG/M2 | DIASTOLIC BLOOD PRESSURE: 60 MMHG | WEIGHT: 315 LBS | SYSTOLIC BLOOD PRESSURE: 126 MMHG

## 2020-06-25 DIAGNOSIS — G47.33 OSA (OBSTRUCTIVE SLEEP APNEA): Primary | ICD-10-CM

## 2020-06-25 DIAGNOSIS — G47.31 COMPLEX SLEEP APNEA SYNDROME: ICD-10-CM

## 2020-06-25 PROCEDURE — 99213 OFFICE O/P EST LOW 20 MIN: CPT | Performed by: INTERNAL MEDICINE

## 2020-06-25 RX ORDER — CLOTRIMAZOLE AND BETAMETHASONE DIPROPIONATE 10; .64 MG/G; MG/G
CREAM TOPICAL 2 TIMES DAILY
COMMUNITY
Start: 2020-06-25 | End: 2021-06-29

## 2020-07-17 DIAGNOSIS — I50.22 CHRONIC SYSTOLIC CHF (CONGESTIVE HEART FAILURE), NYHA CLASS 2 (HCC): ICD-10-CM

## 2020-07-20 RX ORDER — ISOSORBIDE MONONITRATE 30 MG/1
TABLET, EXTENDED RELEASE ORAL
Qty: 90 TABLET | Refills: 2 | Status: SHIPPED | OUTPATIENT
Start: 2020-07-20 | End: 2021-04-25

## 2020-08-14 DIAGNOSIS — I50.22 CHRONIC SYSTOLIC CHF (CONGESTIVE HEART FAILURE), NYHA CLASS 2 (HCC): ICD-10-CM

## 2020-08-14 DIAGNOSIS — I42.8 NICM (NONISCHEMIC CARDIOMYOPATHY) (HCC): ICD-10-CM

## 2020-08-14 RX ORDER — TORSEMIDE 20 MG/1
40 TABLET ORAL 2 TIMES DAILY
Qty: 360 TABLET | Refills: 3 | Status: SHIPPED | OUTPATIENT
Start: 2020-08-14 | End: 2021-08-22

## 2020-10-20 DIAGNOSIS — I42.8 NICM (NONISCHEMIC CARDIOMYOPATHY) (HCC): ICD-10-CM

## 2020-10-20 DIAGNOSIS — I50.22 CHRONIC SYSTOLIC CHF (CONGESTIVE HEART FAILURE), NYHA CLASS 2 (HCC): ICD-10-CM

## 2020-10-20 DIAGNOSIS — I50.42 CHRONIC COMBINED SYSTOLIC AND DIASTOLIC CHF, NYHA CLASS 2 (HCC): ICD-10-CM

## 2020-10-20 RX ORDER — POTASSIUM CHLORIDE 1500 MG/1
TABLET, EXTENDED RELEASE ORAL
Qty: 180 TABLET | Refills: 3 | Status: SHIPPED | OUTPATIENT
Start: 2020-10-20 | End: 2021-10-26

## 2020-12-14 ENCOUNTER — TELEMEDICINE (OUTPATIENT)
Dept: CARDIOLOGY CLINIC | Facility: CLINIC | Age: 55
End: 2020-12-14
Payer: MEDICARE

## 2020-12-14 DIAGNOSIS — G47.33 OSA ON CPAP: ICD-10-CM

## 2020-12-14 DIAGNOSIS — I50.22 CHRONIC SYSTOLIC CHF (CONGESTIVE HEART FAILURE), NYHA CLASS 2 (HCC): ICD-10-CM

## 2020-12-14 DIAGNOSIS — I42.8 NICM (NONISCHEMIC CARDIOMYOPATHY) (HCC): Primary | ICD-10-CM

## 2020-12-14 DIAGNOSIS — Z99.89 OSA ON CPAP: ICD-10-CM

## 2020-12-14 DIAGNOSIS — I10 HTN (HYPERTENSION), BENIGN: ICD-10-CM

## 2020-12-14 PROCEDURE — 99213 OFFICE O/P EST LOW 20 MIN: CPT | Performed by: INTERNAL MEDICINE

## 2021-04-25 ENCOUNTER — IMMUNIZATIONS (OUTPATIENT)
Dept: FAMILY MEDICINE CLINIC | Facility: HOSPITAL | Age: 56
End: 2021-04-25

## 2021-04-25 DIAGNOSIS — I50.22 CHRONIC SYSTOLIC CHF (CONGESTIVE HEART FAILURE), NYHA CLASS 2 (HCC): ICD-10-CM

## 2021-04-25 DIAGNOSIS — Z23 ENCOUNTER FOR IMMUNIZATION: Primary | ICD-10-CM

## 2021-04-25 PROCEDURE — 91300 SARS-COV-2 / COVID-19 MRNA VACCINE (PFIZER-BIONTECH) 30 MCG: CPT

## 2021-04-25 PROCEDURE — 0001A SARS-COV-2 / COVID-19 MRNA VACCINE (PFIZER-BIONTECH) 30 MCG: CPT

## 2021-04-25 RX ORDER — ISOSORBIDE MONONITRATE 30 MG/1
TABLET, EXTENDED RELEASE ORAL
Qty: 90 TABLET | Refills: 2 | Status: SHIPPED | OUTPATIENT
Start: 2021-04-25

## 2021-05-16 ENCOUNTER — IMMUNIZATIONS (OUTPATIENT)
Dept: FAMILY MEDICINE CLINIC | Facility: HOSPITAL | Age: 56
End: 2021-05-16

## 2021-05-16 DIAGNOSIS — Z23 ENCOUNTER FOR IMMUNIZATION: Primary | ICD-10-CM

## 2021-05-16 PROCEDURE — 0002A SARS-COV-2 / COVID-19 MRNA VACCINE (PFIZER-BIONTECH) 30 MCG: CPT

## 2021-05-16 PROCEDURE — 91300 SARS-COV-2 / COVID-19 MRNA VACCINE (PFIZER-BIONTECH) 30 MCG: CPT

## 2021-05-19 ENCOUNTER — OFFICE VISIT (OUTPATIENT)
Dept: CARDIOLOGY CLINIC | Facility: CLINIC | Age: 56
End: 2021-05-19
Payer: MEDICARE

## 2021-05-19 VITALS
SYSTOLIC BLOOD PRESSURE: 120 MMHG | OXYGEN SATURATION: 96 % | DIASTOLIC BLOOD PRESSURE: 76 MMHG | HEIGHT: 65 IN | BODY MASS INDEX: 52.48 KG/M2 | WEIGHT: 315 LBS | HEART RATE: 77 BPM

## 2021-05-19 DIAGNOSIS — G47.33 OSA ON CPAP: ICD-10-CM

## 2021-05-19 DIAGNOSIS — I50.22 CHRONIC SYSTOLIC CHF (CONGESTIVE HEART FAILURE), NYHA CLASS 2 (HCC): ICD-10-CM

## 2021-05-19 DIAGNOSIS — I10 HTN (HYPERTENSION), BENIGN: ICD-10-CM

## 2021-05-19 DIAGNOSIS — Z99.89 OSA ON CPAP: ICD-10-CM

## 2021-05-19 DIAGNOSIS — E78.2 MIXED HYPERLIPIDEMIA: Primary | ICD-10-CM

## 2021-05-19 DIAGNOSIS — I42.8 NICM (NONISCHEMIC CARDIOMYOPATHY) (HCC): ICD-10-CM

## 2021-05-19 PROCEDURE — 99214 OFFICE O/P EST MOD 30 MIN: CPT | Performed by: INTERNAL MEDICINE

## 2021-05-19 NOTE — PROGRESS NOTES
Heart Failure Outpatient Progress Note - Chaz Chaney 54 y o  male MRN: [id number]    @ Encounter: [id number]      Assessment/Plan:    Patient Active Problem List    Diagnosis Date Noted    Acute on chronic combined systolic and diastolic congestive heart failure ([facility] ) 06/29/2017    Sepsis ([facility] ) 06/29/2017    Urinary tract infection 06/29/2017    Essential hypertension 06/29/2017    Morbid obesity due to excess calories ([facility] ) 06/29/2017    VIRA (acute kidney injury) ([place] [address] ) 06/29/2017       # Chronic systolic Heart failure, Stage C, NYHA 2  Etiology: NICM- obesity, HTN     Weight:402 lbs, prior 386 lbs , up from 373 lbs  NT proBNP: 3/7/20: 348     Studies- personally reviewed by me    Echo 6/13/19:  LVEF: 50%, grade 2 DD  LVIDd: 6 2 cm  RV: normal  MR: mild     Echocardiogram 11/16/17  LVEF: 35%  LVIDd: 6 5  RV: dilated     Echo June 2017  LVEF: 35%     Neurohormonal Blockade:  --Beta-Blocker: coreg 12 5 mg BID  --ACEi, ARB or ARNi: hydralazine 25 mg TID, Imdur 30 mg daily    (or SVR reduction)  --Aldosterone Receptor Blocker: gynecomastia from spironolactone and inspra so off of them now  --Diuretic: torsemide 80 mg daily, K 20 meq daily     Sudden Cardiac Death Risk Reduction:  --ICD: EF now around 50%     Electrical Resynchronization:  --narrow QRS     Advanced Therapies (If appropriate): --Inotrope:  --LVAD/Transplant Candidacy:     # Morbid obesity- BMI 67, recommend gastric bypass eval  # hyperlipidemia 9/25: , HDL 32  # HTN  # KYRIE- BiPAP  # CKD, Cr 1 28 on 3/7/21     TODAY'S PLAN:  Continue lasix 80 mg daily  Continue BiPap for KYRIE  --2g sodium diet  - Daily weights     HPI:   47 yo male presents for follow up of systolic heart failure  He follows with Dr Hardy Simon for BiV heart failure, obesity and KYRIE  He states he did not have medical insurance for 2 5 years and then got Medicare, went to see his family doctor who sent him to ER due to his volume overload  He had not been on medicines   He is down 24 lbs since his admission  He reports he had a cardiac cath back around 2010 which was negative  He was discharged from the hospital on coreg 6 25 mg BID, hydralazine and imdur  His last Cr was 1 39  He was discharged on lasix 40 mg BID  HE is low sodium in his diet  He had lost 12 more lbs since his discharge  He is getting cramping      On my last follow up his weight was up more as drinking too much so I educated about amount to drink  Also discussed gastric bypass as BMI was 67  Also continued to recommend ICD for primary prevention       Interval History:  Often won't take diuretic as it prevents him from getting out- especially on weekends  Recommended lasix 80 mg daily instead of 40 mg BID for compliance and sleep  Recommended Gastric bypass  He states his pills make him feel very lethargic       Review of Systems   Constitutional: Negative for activity change, appetite change, fatigue and unexpected weight change  HENT: Negative for congestion and nosebleeds  Eyes: Negative  Respiratory: Negative for cough, chest tightness and shortness of breath  Cardiovascular: Negative for chest pain, palpitations and leg swelling  Gastrointestinal: Negative for abdominal distention  Endocrine: Negative  Genitourinary: Negative  Musculoskeletal: Negative  Skin: Negative  Neurological: Negative for dizziness, syncope and weakness  Hematological: Negative  Psychiatric/Behavioral: Negative  Past Medical History:   Diagnosis Date    CHF (congestive heart failure) (HCC)     Hx of cardiac cath     Hypertension     KYRIE treated with BiPAP          Allergies   Allergen Reactions    Penicillins            Current Outpatient Medications:     aspirin 81 mg chewable tablet, Chew 1 tablet daily, Disp: , Rfl: 0    carvedilol (COREG) 12 5 mg tablet, TAKE 1 TABLET (12 5 MG TOTAL) BY MOUTH 2 (TWO) TIMES A DAY, Disp: 180 tablet, Rfl: 3    clotrimazole-betamethasone (LOTRISONE) 1-0 05 % cream, Apply topically 2 (two) times a day, Disp: , Rfl:     diclofenac (VOLTAREN) 75 mg EC tablet, , Disp: , Rfl: 1    fluticasone (FLONASE) 50 mcg/act nasal spray, 1 spray into each nostril daily, Disp: , Rfl:     hydrALAZINE (APRESOLINE) 25 mg tablet, TAKE 1 TABLET BY MOUTH THREE TIMES A DAY, Disp: 270 tablet, Rfl: 3    isosorbide mononitrate (IMDUR) 30 mg 24 hr tablet, TAKE 1 TABLET BY MOUTH EVERY DAY, Disp: 90 tablet, Rfl: 2    Klor-Con M20 20 MEQ tablet, TAKE 2 TABLETS BY MOUTH EVERY DAY, Disp: 180 tablet, Rfl: 3    Magnesium 500 MG TABS, Take 1 capsule by mouth daily , Disp: , Rfl:     torsemide (DEMADEX) 20 mg tablet, TAKE 2 TABLETS (40 MG TOTAL) BY MOUTH 2 (TWO) TIMES A DAY, Disp: 360 tablet, Rfl: 3    Social History     Socioeconomic History    Marital status: /Civil Union     Spouse name: Not on file    Number of children: Not on file    Years of education: Not on file    Highest education level: Not on file   Occupational History    Not on file   Social Needs    Financial resource strain: Not on file    Food insecurity     Worry: Not on file     Inability: Not on file   [employer] needs     Medical: Not on file     Non-medical: Not on file   Tobacco Use    Smoking status: Never Smoker    Smokeless tobacco: Never Used   Substance and Sexual Activity    Alcohol use: No    Drug use: No    Sexual activity: Not on file   Lifestyle    Physical activity     Days per week: Not on file     Minutes per session: Not on file    Stress: Not on file   Relationships    Social connections     Talks on phone: Not on file     Gets together: Not on file     Attends Oriental orthodox service: Not on file     Active member of club or organization: Not on file     Attends meetings of clubs or organizations: Not on file     Relationship status: Not on file    Intimate partner violence     Fear of current or ex partner: Not on file     Emotionally abused: Not on file     Physically abused: Not on file     Forced sexual activity: Not on file   Other Topics Concern    Not on file   Social History Narrative    Not on file       No family history on file  Physical Exam:    Vitals:     Physical Exam    Labs & Results:    Lab Results   Component Value Date    SODIUM 142 03/07/2020    K 4 5 03/07/2020     03/07/2020    CO2 31 03/07/2020    BUN 19 03/07/2020    CREATININE 1 28 03/07/2020    GLUC 108 08/08/2017    CALCIUM 9 1 03/07/2020     Lab Results   Component Value Date    WBC 10 61 (H) 03/07/2020    HGB 14 6 03/07/2020    HCT 39 8 03/07/2020    MCV 95 03/07/2020     03/07/2020     Lab Results   Component Value Date    NTBNP 348 (H) 03/07/2020      Lab Results   Component Value Date    CHOLESTEROL 188 09/21/2019    CHOLESTEROL 182 02/16/2019    CHOLESTEROL 183 02/24/2018     Lab Results   Component Value Date    HDL 32 (L) 09/21/2019    HDL 31 (L) 02/16/2019    HDL 35 (L) 02/24/2018     Lab Results   Component Value Date    TRIG 156 (H) 09/21/2019    TRIG 178 (H) 02/16/2019    TRIG 136 02/24/2018     Lab Results   Component Value Date    [place] 156 09/21/2019    [place] 151 02/16/2019       EKG personally reviewed by Argelia Amin DO  Counseling / Coordination of Care  Time spent today 25 minutes  Greater than 50% of total time was spent with the patient and / or family counseling and / or coordination of care  We went over current diagnosis, most recent studies and any changes in treatment  Thank you for the opportunity to participate in the care of this patient      [address] [place] [address] PULMONARY HYPERTENSION  MEDICAL DIRECTOR OF [place]

## 2021-06-15 DIAGNOSIS — I50.22 CHRONIC SYSTOLIC CHF (CONGESTIVE HEART FAILURE), NYHA CLASS 2 (HCC): ICD-10-CM

## 2021-06-15 DIAGNOSIS — I10 HTN (HYPERTENSION), BENIGN: ICD-10-CM

## 2021-06-17 RX ORDER — CARVEDILOL 12.5 MG/1
12.5 TABLET ORAL 2 TIMES DAILY
Qty: 180 TABLET | Refills: 3 | Status: SHIPPED | OUTPATIENT
Start: 2021-06-17 | End: 2022-06-21

## 2021-06-17 RX ORDER — HYDRALAZINE HYDROCHLORIDE 25 MG/1
TABLET, FILM COATED ORAL
Qty: 270 TABLET | Refills: 3 | Status: SHIPPED | OUTPATIENT
Start: 2021-06-17 | End: 2022-06-21

## 2021-06-29 ENCOUNTER — OFFICE VISIT (OUTPATIENT)
Dept: SLEEP CENTER | Facility: CLINIC | Age: 56
End: 2021-06-29
Payer: MEDICARE

## 2021-06-29 VITALS
DIASTOLIC BLOOD PRESSURE: 62 MMHG | WEIGHT: 315 LBS | HEART RATE: 87 BPM | BODY MASS INDEX: 52.48 KG/M2 | SYSTOLIC BLOOD PRESSURE: 124 MMHG | HEIGHT: 65 IN

## 2021-06-29 DIAGNOSIS — G47.33 OSA (OBSTRUCTIVE SLEEP APNEA): Primary | ICD-10-CM

## 2021-06-29 PROCEDURE — 99213 OFFICE O/P EST LOW 20 MIN: CPT | Performed by: INTERNAL MEDICINE

## 2021-06-29 NOTE — PROGRESS NOTES
Progress Note - Sleep Center   Jair Valdez [place]:[id number] MRN: [id number]      Reason for Visit:  54 y  o male here for annual follow-up    Assessment:  Doing well on current therapy of BPAP S/T 9/5 cm with back-up 8/min for KYRIE/CSA AHI=18 0  Plan:  Continue same    Follow up: One year    History of Present Illness:  History of KYRIE on PAP therapy  Fully compliant and deriving benefit      Review of Systems      Genitourinary need to urinate more than twice a night   Cardiology palpitations/fluttering feeling in the chest and ankle/leg swelling   Gastrointestinal none   Neurology muscle weakness, forgetfulness, poor concentration or confusion,  and difficulty with memory   Constitutional fatigue and weight change   Integumentary none   Psychiatry depression   Musculoskeletal joint pain and back pain   Pulmonary shortness of breath with activity and difficulty breathing when lying flat    ENT ringing in ears   Endocrine excessive thirst   Hematological none         I have reviewed and updated the review of systems as necessary      Historical Information    Past Medical History:   Past Medical History:   Diagnosis Date    [facility]congestive [facility]) [facility] )     Hx of cardiac cath     Hypertension     KYRIE treated with BiPAP          Past Surgical History:   Past Surgical History:   Procedure Laterality Date    ABDOMINAL SURGERY      stomach cauterization post emesis    KNEE ARTHROSCOPY Right        Social History:   Social History     Socioeconomic History    Marital status: /Civil Union     Spouse name: None    Number of children: None    Years of education: None    Highest education level: None   Occupational History    None   Tobacco Use    Smoking status: Never Smoker    Smokeless tobacco: Never Used   Vaping Use    Vaping Use: Never used   Substance and Sexual Activity    Alcohol use: No    Drug use: No    Sexual activity: None   Other Topics Concern    None   Social History Narrative    None     Social Determinants of Health     Financial Resource Strain:     Difficulty of Paying Living Expenses:    Food Insecurity:     Worried About Running Out of Food in the Last Year:     [address] in the Last Year:    Transportation Needs:     Lack of Transportation (Medical):  Lack of Transportation (Non-Medical):    Physical Activity:     Days of Exercise per Week:     Minutes of Exercise per Session:    Stress:     Feeling of Stress :    Social Connections:     Frequency of Communication with Friends and Family:     Frequency of Social Gatherings with Friends and Family:     Attends Religion Services:     Active Member of Clubs or Organizations:     Attends Club or Organization Meetings:     Marital Status:    Intimate Partner Violence:     Fear of Current or Ex-Partner:     Emotionally Abused:     Physically Abused:     Sexually Abused:        Family History: No family history on file      Medications/Allergies:      Current Outpatient Medications:     aspirin 81 mg chewable tablet, Chew 1 tablet daily, Disp: , Rfl: 0    carvedilol (COREG) 12 5 mg tablet, TAKE 1 TABLET (12 5 MG TOTAL) BY MOUTH 2 (TWO) TIMES A DAY, Disp: 180 tablet, Rfl: 3    clotrimazole-betamethasone (LOTRISONE) 1-0 05 % cream, Apply topically 2 (two) times a day, Disp: , Rfl:     diclofenac (VOLTAREN) 75 mg EC tablet, , Disp: , Rfl: 1    fluticasone (FLONASE) 50 mcg/act nasal spray, 1 spray into each nostril daily, Disp: , Rfl:     hydrALAZINE (APRESOLINE) 25 mg tablet, TAKE 1 TABLET BY MOUTH THREE TIMES A DAY, Disp: 270 tablet, Rfl: 3    isosorbide mononitrate (IMDUR) 30 mg 24 hr tablet, TAKE 1 TABLET BY MOUTH EVERY DAY, Disp: 90 tablet, Rfl: 2    Klor-Con M20 20 MEQ tablet, TAKE 2 TABLETS BY MOUTH EVERY DAY, Disp: 180 tablet, Rfl: 3    Magnesium 500 MG TABS, Take 1 capsule by mouth daily , Disp: , Rfl:     torsemide (DEMADEX) 20 mg tablet, TAKE 2 TABLETS (40 MG TOTAL) BY MOUTH 2 (TWO) TIMES A DAY, Disp: 360 tablet, Rfl: 3          Objective      Vital Signs:   Vitals:    06/29/21 1522   BP: 124/62   Pulse: 87     [place] Sleepiness Scale: Total score: 16        Physical Exam:    General: Alert, appropriate, cooperative, overweight    Head: NC/AT    Skin: Warm, dry    Neuro: No motor abnormalities, cranial nerves appear intact    Extremity: No clubbing, cyanosis      DME Provider: STEPHANIE        Counseling / Coordination of Care   I have spent 15 minutes with the patient today in which greater than 50% of this time was spent in counseling/coordination of care regarding: equipment and compliance  Board Certified Sleep Specialist    Portions of the record may have been created with voice recognition software  Occasional wrong word or "sound a like" substitutions may have occurred due to the inherent limitations of voice recognition software  Read the chart carefully and recognize, using context, where substitutions have occurred

## 2021-08-22 DIAGNOSIS — I50.22 CHRONIC SYSTOLIC CHF (CONGESTIVE HEART FAILURE), NYHA CLASS 2 (HCC): ICD-10-CM

## 2021-08-22 DIAGNOSIS — I42.8 NICM (NONISCHEMIC CARDIOMYOPATHY) (HCC): ICD-10-CM

## 2021-08-22 RX ORDER — TORSEMIDE 20 MG/1
40 TABLET ORAL 2 TIMES DAILY
Qty: 360 TABLET | Refills: 3 | Status: SHIPPED | OUTPATIENT
Start: 2021-08-22

## 2021-09-22 ENCOUNTER — APPOINTMENT (EMERGENCY)
Dept: RADIOLOGY | Facility: HOSPITAL | Age: 56
End: 2021-09-22
Payer: MEDICARE

## 2021-09-22 ENCOUNTER — HOSPITAL ENCOUNTER (EMERGENCY)
Facility: HOSPITAL | Age: 56
Discharge: HOME | End: 2021-09-22
Attending: EMERGENCY MEDICINE
Payer: MEDICARE

## 2021-09-22 VITALS
DIASTOLIC BLOOD PRESSURE: 69 MMHG | RESPIRATION RATE: 18 BRPM | HEART RATE: 82 BPM | OXYGEN SATURATION: 97 % | TEMPERATURE: 98.5 F | SYSTOLIC BLOOD PRESSURE: 145 MMHG

## 2021-09-22 DIAGNOSIS — M79.673 FOOT PAIN: Primary | ICD-10-CM

## 2021-09-22 PROCEDURE — 99284 EMERGENCY DEPT VISIT MOD MDM: CPT | Performed by: EMERGENCY MEDICINE

## 2021-09-22 PROCEDURE — 96374 THER/PROPH/DIAG INJ IV PUSH: CPT

## 2021-09-22 PROCEDURE — 73630 X-RAY EXAM OF FOOT: CPT

## 2021-09-22 PROCEDURE — 99284 EMERGENCY DEPT VISIT MOD MDM: CPT

## 2021-09-22 RX ORDER — KETOROLAC TROMETHAMINE 30 MG/ML
30 INJECTION, SOLUTION INTRAMUSCULAR; INTRAVENOUS ONCE
Status: COMPLETED | OUTPATIENT
Start: 2021-09-22 | End: 2021-09-22

## 2021-09-22 RX ORDER — TAMSULOSIN HYDROCHLORIDE 0.4 MG/1
CAPSULE ORAL
COMMUNITY
Start: 2021-07-26

## 2021-09-22 RX ORDER — NAPROXEN 500 MG/1
500 TABLET ORAL 2 TIMES DAILY WITH MEALS
Qty: 14 TABLET | Refills: 0 | Status: SHIPPED | OUTPATIENT
Start: 2021-09-22 | End: 2022-03-07

## 2021-09-22 RX ORDER — NAPROXEN 500 MG/1
500 TABLET ORAL 2 TIMES DAILY WITH MEALS
Qty: 14 TABLET | Refills: 0 | Status: SHIPPED | OUTPATIENT
Start: 2021-09-22 | End: 2021-09-22 | Stop reason: SDUPTHER

## 2021-09-22 RX ADMIN — KETOROLAC TROMETHAMINE 30 MG: 30 INJECTION, SOLUTION INTRAMUSCULAR at 19:41

## 2021-09-22 NOTE — ED PROVIDER NOTES
History  Chief Complaint   Patient presents with    Foot Pain     Right foot pain  Per patient, no trauma, twist or rolling  History provided by:  Patient   used: No      80-year-old male presents for evaluation of right foot pain  A patient states he stepped wrong several days ago, noted worsening pain since  He denies any falls or trauma  He denies any calf pain  Denies any shortness of breath or chest pain  He states he typically ambulates around his house with slippers  Used Voltaren gel with no improvement  No history of DVT or PE  Prior to Admission Medications   Prescriptions Last Dose Informant Patient Reported? Taking?    Klor-Con M20 20 MEQ tablet  Self No Yes   Sig: TAKE 2 TABLETS BY MOUTH EVERY DAY   Magnesium 500 MG TABS  Self Yes Yes   Sig: Take 1 capsule by mouth daily    aspirin 81 mg chewable tablet  Self No Yes   Sig: Chew 1 tablet daily   carvedilol (COREG) 12 5 mg tablet   No Yes   Sig: TAKE 1 TABLET (12 5 MG TOTAL) BY MOUTH 2 (TWO) TIMES A DAY   clotrimazole-betamethasone (LOTRISONE) 1-0 05 % cream  Self Yes No   Sig: Apply topically 2 (two) times a day   diclofenac (VOLTAREN) 75 mg EC tablet Not Taking at Unknown time Self Yes No   Patient not taking: No sig reported   fluticasone (FLONASE) 50 mcg/act nasal spray  Self Yes Yes   Si spray into each nostril daily   hydrALAZINE (APRESOLINE) 25 mg tablet   No Yes   Sig: TAKE 1 TABLET BY MOUTH THREE TIMES A DAY   isosorbide mononitrate (IMDUR) 30 mg 24 hr tablet  Self No Yes   Sig: TAKE 1 TABLET BY MOUTH EVERY DAY   tamsulosin (FLOMAX) 0 4 mg   Yes Yes   Sig: TAKE 1 CAPSULE BY MOUTH EVERY DAY AT NIGHT   torsemide (DEMADEX) 20 mg tablet   No Yes   Sig: TAKE 2 TABLETS (40 MG TOTAL) BY MOUTH 2 (TWO) TIMES A DAY      Facility-Administered Medications: None       Past Medical History:   Diagnosis Date    CHF (congestive heart failure) (HCC)     Hx of cardiac cath     Hypertension     KYRIE treated with BiPAP Past Surgical History:   Procedure Laterality Date    ABDOMINAL SURGERY      stomach cauterization post emesis    KNEE ARTHROSCOPY Right        History reviewed  No pertinent family history  I have reviewed and agree with the history as documented  E-Cigarette/Vaping    E-Cigarette Use Never User      E-Cigarette/Vaping Substances    Nicotine No     THC No     CBD No     Flavoring No     Other No     Unknown No      Social History     Tobacco Use    Smoking status: Never Smoker    Smokeless tobacco: Never Used   Vaping Use    Vaping Use: Never used   Substance Use Topics    Alcohol use: No    Drug use: No       Review of Systems   Musculoskeletal:        Right foot pain   All other systems reviewed and are negative  Physical Exam  Physical Exam  Vitals and nursing note reviewed  Constitutional:       General: He is not in acute distress  Appearance: He is well-developed  He is not diaphoretic  HENT:      Head: Normocephalic and atraumatic  Right Ear: External ear normal       Left Ear: External ear normal    Eyes:      General:         Right eye: No discharge  Left eye: No discharge  Pupils: Pupils are equal, round, and reactive to light  Neck:      Thyroid: No thyromegaly  Trachea: No tracheal deviation  Cardiovascular:      Rate and Rhythm: Normal rate and regular rhythm  Heart sounds: No murmur heard  Pulmonary:      Effort: Pulmonary effort is normal       Breath sounds: Normal breath sounds  Abdominal:      General: Bowel sounds are normal  There is no distension  Palpations: Abdomen is soft  Tenderness: There is no abdominal tenderness  Musculoskeletal:         General: No deformity  Normal range of motion  Cervical back: Normal range of motion and neck supple  Comments: No calf swelling or tenderness  Intact Achilles tendon  Tenderness over the plantar fascia  No deformity  Strong pulses    Warm, well-perfused extremity  Skin:     General: Skin is warm  Capillary Refill: Capillary refill takes less than 2 seconds  Neurological:      Mental Status: He is alert and oriented to person, place, and time  Cranial Nerves: No cranial nerve deficit  Motor: No abnormal muscle tone  Psychiatric:         Behavior: Behavior normal          Vital Signs  ED Triage Vitals   Temperature Pulse Respirations Blood Pressure SpO2   09/22/21 1850 09/22/21 1850 09/22/21 1850 09/22/21 1850 09/22/21 1850   98 5 °F (36 9 °C) 82 18 145/69 97 %      Temp Source Heart Rate Source Patient Position - Orthostatic VS BP Location FiO2 (%)   09/22/21 1850 09/22/21 1850 -- 09/22/21 1850 --   Oral Monitor  Right arm       Pain Score       09/22/21 1941       8           Vitals:    09/22/21 1850   BP: 145/69   Pulse: 82         Visual Acuity      ED Medications  Medications   ketorolac (TORADOL) injection 30 mg (30 mg Intravenous Given 9/22/21 1941)       Diagnostic Studies  Results Reviewed     None                 XR foot 3+ views RIGHT   ED Interpretation by Yuridia Park MD (09/22 1942)   No acute osseous abnormality  Procedures  Procedures         ED Course                             SBIRT 20yo+      Most Recent Value   SBIRT (22 yo +)   In order to provide better care to our patients, we are screening all of our patients for alcohol and drug use  Would it be okay to ask you these screening questions? Yes Filed at: 09/22/2021 1903   Initial Alcohol Screen: US AUDIT-C    1  How often do you have a drink containing alcohol?  0 Filed at: 09/22/2021 1903   2  How many drinks containing alcohol do you have on a typical day you are drinking? 0 Filed at: 09/22/2021 1903   3a  Male UNDER 65: How often do you have five or more drinks on one occasion? 0 Filed at: 09/22/2021 1903   Audit-C Score  0 Filed at: 09/22/2021 6505   SUNNY: How many times in the past year have you       Used an illegal drug or used a prescription medication for non-medical reasons? Never Filed at: 09/22/2021 1903                    [facility]  Number of Diagnoses or Management Options  Foot pain: new and requires workup  Diagnosis management comments: History of physical exam consistent with plantar fasciitis  X-ray reviewed, no evidence of acute fracture  No evidence of Achilles tendon rupture  No calf swelling or tenderness to suggest DVT  Recommended rice therapy, pain improved with Toradol  Recommended supportive shoes at home, elevation, rest, compression, anti-inflammatory medications  Podiatry information given for follow-up if still symptomatic after the next week  Amount and/or Complexity of Data Reviewed  Tests in the radiology section of CPT®: ordered and reviewed  Independent visualization of images, tracings, or specimens: yes    Risk of Complications, Morbidity, and/or Mortality  Presenting problems: moderate  Diagnostic procedures: moderate  Management options: moderate  General comments:      Patient Progress  Patient progress: stable      Disposition  Final diagnoses: Foot pain     Time reflects when diagnosis was documented in both MDM as applicable and the Disposition within this note     Time User Action Codes Description Comment    9/22/2021  8:05 PM Dickson Anthony Add [X40 563] Foot pain       ED Disposition     ED Disposition Condition Date/Time Comment    Discharge Stable Wed Sep 22, 2021  8:05 PM Bela Haley discharge to home/self care              Follow-up Information     Follow up With Specialties Details Why Contact Info Additional Information    [facility] Schedule an appointment as soon as possible for a visit in 1 week Right foot pain [address] [facility],[address] [id number] [phone] [address] [address] [address]  [phone]          Discharge Medication List as of 9/22/2021  8:06 PM      START taking these medications Details   naproxen (NAPROSYN) 500 mg tablet Take 1 tablet (500 mg total) by mouth 2 (two) times a day with meals for 7 days, Starting Wed 9/22/2021, Until Wed 9/29/2021, Print         CONTINUE these medications which have NOT CHANGED    Details   aspirin 81 mg chewable tablet Chew 1 tablet daily, Starting Mon 7/3/2017, Print      carvedilol (COREG) 12 5 mg tablet TAKE 1 TABLET (12 5 MG TOTAL) BY MOUTH 2 (TWO) TIMES A DAY, Starting Thu 6/17/2021, Normal      fluticasone (FLONASE) 50 mcg/act nasal spray 1 spray into each nostril daily, Historical Med      hydrALAZINE (APRESOLINE) 25 mg tablet TAKE 1 TABLET BY MOUTH THREE TIMES A DAY, Normal      isosorbide mononitrate (IMDUR) 30 mg 24 hr tablet TAKE 1 TABLET BY MOUTH EVERY DAY, Normal      Klor-Con M20 20 MEQ tablet TAKE 2 TABLETS BY MOUTH EVERY DAY, Normal      Magnesium 500 MG TABS Take 1 capsule by mouth daily , Historical Med      tamsulosin (FLOMAX) 0 4 mg TAKE 1 CAPSULE BY MOUTH EVERY DAY AT NIGHT, Historical Med      torsemide (DEMADEX) 20 mg tablet TAKE 2 TABLETS (40 MG TOTAL) BY MOUTH 2 (TWO) TIMES A DAY, Starting Sun 8/22/2021, Normal      clotrimazole-betamethasone (LOTRISONE) 1-0 05 % cream Apply topically 2 (two) times a day, Starting Thu 6/25/2020, Until Tue 6/29/2021, Historical Med      diclofenac (VOLTAREN) 75 mg EC tablet Historical Med           No discharge procedures on file      PDMP Review     None          ED Provider  Electronically Signed by           Ermias Harris MD  09/22/21 2516

## 2021-10-26 DIAGNOSIS — I50.42 CHRONIC COMBINED SYSTOLIC AND DIASTOLIC CHF, NYHA CLASS 2 (HCC): ICD-10-CM

## 2021-10-26 DIAGNOSIS — I50.22 CHRONIC SYSTOLIC CHF (CONGESTIVE HEART FAILURE), NYHA CLASS 2 (HCC): ICD-10-CM

## 2021-10-26 DIAGNOSIS — I42.8 NICM (NONISCHEMIC CARDIOMYOPATHY) (HCC): ICD-10-CM

## 2021-10-26 RX ORDER — POTASSIUM CHLORIDE 1500 MG/1
TABLET, EXTENDED RELEASE ORAL
Qty: 180 TABLET | Refills: 3 | Status: SHIPPED | OUTPATIENT
Start: 2021-10-26

## 2021-12-04 ENCOUNTER — APPOINTMENT (OUTPATIENT)
Dept: LAB | Facility: CLINIC | Age: 56
End: 2021-12-04
Payer: MEDICARE

## 2021-12-04 DIAGNOSIS — Z13.6 SCREENING FOR CARDIOVASCULAR CONDITION: ICD-10-CM

## 2021-12-04 DIAGNOSIS — I10 ESSENTIAL HYPERTENSION, MALIGNANT: ICD-10-CM

## 2021-12-04 DIAGNOSIS — R53.83 FATIGUE, UNSPECIFIED TYPE: ICD-10-CM

## 2021-12-04 DIAGNOSIS — Z12.5 SCREENING PSA (PROSTATE SPECIFIC ANTIGEN): ICD-10-CM

## 2021-12-04 DIAGNOSIS — R73.01 IMPAIRED FASTING GLUCOSE: ICD-10-CM

## 2021-12-04 LAB
ALBUMIN SERPL BCP-MCNC: 3.5 G/DL (ref 3.5–5)
ALP SERPL-CCNC: 82 U/L (ref 46–116)
ALT SERPL W P-5'-P-CCNC: 52 U/L (ref 12–78)
ANION GAP SERPL CALCULATED.3IONS-SCNC: 7 MMOL/L (ref 4–13)
AST SERPL W P-5'-P-CCNC: 28 U/L (ref 5–45)
BASOPHILS # BLD AUTO: 0.14 THOUSANDS/ΜL (ref 0–0.1)
BASOPHILS NFR BLD AUTO: 1 % (ref 0–1)
BILIRUB SERPL-MCNC: 0.43 MG/DL (ref 0.2–1)
BUN SERPL-MCNC: 25 MG/DL (ref 5–25)
CALCIUM SERPL-MCNC: 9 MG/DL (ref 8.3–10.1)
CHLORIDE SERPL-SCNC: 101 MMOL/L (ref 100–108)
CHOLEST SERPL-MCNC: 212 MG/DL
CO2 SERPL-SCNC: 30 MMOL/L (ref 21–32)
CREAT SERPL-MCNC: 1.37 MG/DL (ref 0.6–1.3)
EOSINOPHIL # BLD AUTO: 0.57 THOUSAND/ΜL (ref 0–0.61)
EOSINOPHIL NFR BLD AUTO: 5 % (ref 0–6)
ERYTHROCYTE [DISTWIDTH] IN BLOOD BY AUTOMATED COUNT: 14.2 % (ref 11.6–15.1)
EST. AVERAGE GLUCOSE BLD GHB EST-MCNC: 111 MG/DL
GFR SERPL CREATININE-BSD FRML MDRD: 57 ML/MIN/1.73SQ M
GLUCOSE P FAST SERPL-MCNC: 131 MG/DL (ref 65–99)
HBA1C MFR BLD: 5.5 %
HCT VFR BLD AUTO: 42 % (ref 36.5–49.3)
HDLC SERPL-MCNC: 35 MG/DL
HGB BLD-MCNC: 14.6 G/DL (ref 12–17)
IMM GRANULOCYTES # BLD AUTO: 0.2 THOUSAND/UL (ref 0–0.2)
IMM GRANULOCYTES NFR BLD AUTO: 2 % (ref 0–2)
LDLC SERPL CALC-MCNC: 142 MG/DL (ref 0–100)
LYMPHOCYTES # BLD AUTO: 2.24 THOUSANDS/ΜL (ref 0.6–4.47)
LYMPHOCYTES NFR BLD AUTO: 20 % (ref 14–44)
MCH RBC QN AUTO: 32.9 PG (ref 26.8–34.3)
MCHC RBC AUTO-ENTMCNC: 34.8 G/DL (ref 31.4–37.4)
MCV RBC AUTO: 95 FL (ref 82–98)
MONOCYTES # BLD AUTO: 0.88 THOUSAND/ΜL (ref 0.17–1.22)
MONOCYTES NFR BLD AUTO: 8 % (ref 4–12)
NEUTROPHILS # BLD AUTO: 7.38 THOUSANDS/ΜL (ref 1.85–7.62)
NEUTS SEG NFR BLD AUTO: 64 % (ref 43–75)
NONHDLC SERPL-MCNC: 177 MG/DL
NRBC BLD AUTO-RTO: 0 /100 WBCS
PLATELET # BLD AUTO: 263 THOUSANDS/UL (ref 149–390)
PMV BLD AUTO: 10.3 FL (ref 8.9–12.7)
POTASSIUM SERPL-SCNC: 3.9 MMOL/L (ref 3.5–5.3)
PROT SERPL-MCNC: 8.5 G/DL (ref 6.4–8.2)
PSA SERPL-MCNC: 1 NG/ML (ref 0–4)
RBC # BLD AUTO: 4.44 MILLION/UL (ref 3.88–5.62)
SODIUM SERPL-SCNC: 138 MMOL/L (ref 136–145)
TRIGL SERPL-MCNC: 177 MG/DL
TSH SERPL DL<=0.05 MIU/L-ACNC: 1.83 UIU/ML (ref 0.36–3.74)
WBC # BLD AUTO: 11.41 THOUSAND/UL (ref 4.31–10.16)

## 2021-12-04 PROCEDURE — 85025 COMPLETE CBC W/AUTO DIFF WBC: CPT

## 2021-12-04 PROCEDURE — 80053 COMPREHEN METABOLIC PANEL: CPT

## 2021-12-04 PROCEDURE — G0103 PSA SCREENING: HCPCS

## 2021-12-04 PROCEDURE — 80061 LIPID PANEL: CPT

## 2021-12-04 PROCEDURE — 36415 COLL VENOUS BLD VENIPUNCTURE: CPT

## 2021-12-04 PROCEDURE — 83036 HEMOGLOBIN GLYCOSYLATED A1C: CPT

## 2021-12-04 PROCEDURE — 84443 ASSAY THYROID STIM HORMONE: CPT

## 2022-03-02 NOTE — PROGRESS NOTES
Virtual Brief Visit    Patient is located in the following state in which I hold an active license [place]      Assessment/Plan:  # Chronic systolic Heart failure, Stage C, NYHA 2  Etiology: NICM- obesity, HTN     Weight:402 lbs, prior 386 lbs , up from 373 lbs  NT proBNP: 3/7/20: 348     Studies- personally reviewed by me    Echo 6/13/19:  LVEF: 50%, grade 2 DD  LVIDd: 6 2 cm  RV: normal  MR: mild     Echocardiogram 11/16/17  LVEF: 35%  LVIDd: 6 5  RV: dilated     Echo June 2017  LVEF: 35%     Neurohormonal Blockade:  --Beta-Blocker: coreg 12 5 mg BID  --ACEi, ARB or ARNi: hydralazine 25 mg TID, Imdur 30 mg daily    (or SVR reduction)  --Aldosterone Receptor Blocker: gynecomastia from spironolactone and inspra so off of them now  --Diuretic: torsemide 80 mg daily, K 20 meq daily     Sudden Cardiac Death Risk Reduction:  --ICD: EF now around 50%     Electrical Resynchronization:  --narrow QRS     Advanced Therapies (If appropriate): --Inotrope:  --LVAD/Transplant Candidacy:     # Morbid obesity- BMI 67, recommend gastric bypass eval  # hyperlipidemia   12/4/21: Tri 177, HDL 35,   9/25: , HDL 32  # HTN  # KYRIE- BiPAP  # CKD, Cr 1 37 on 12/4/21     TODAY'S PLAN:  Continue lasix 80 mg daily for HF  Recommend statin for hyperlipidemia  Continue BiPap for KYRIE  --2g sodium diet  - Daily weights     HPI:   62 yo male presents for follow up of systolic heart failure  He follows with Dr Milton Horta for BiV heart failure, obesity and KYRIE  He states he did not have medical insurance for 2 5 years and then got Medicare, went to see his family doctor who sent him to ER due to his volume overload  He had not been on medicines  He is down 24 lbs since his admission  He reports he had a cardiac cath back around 2010 which was negative  He was discharged from the hospital on coreg 6 25 mg BID, hydralazine and imdur  His last Cr was 1 39  He was discharged on lasix 40 mg BID  HE is low sodium in his diet   He had lost 12 more lbs since his discharge  He is getting cramping      On my last follow up his weight was up more as drinking too much so I educated about amount to drink  Also discussed gastric bypass as BMI was 67  Also continued to recommend ICD for primary prevention       Interval History:  Often won't take diuretic as it prevents him from getting out- especially on weekends  Recommended lasix 80 mg daily instead of 40 mg BID for compliance and sleep  Recommended Gastric bypass  He states his pills make him feel very lethargic    Had COVID in Dec    Problem List Items Addressed This Visit     None          Recent Visits  No visits were found meeting these conditions  Showing recent visits within past 7 days and meeting all other requirements  Future Appointments  No visits were found meeting these conditions    Showing future appointments within next 150 days and meeting all other requirements         I spent 20 minutes directly with the patient during this visit

## 2022-03-07 ENCOUNTER — TELEMEDICINE (OUTPATIENT)
Dept: CARDIOLOGY CLINIC | Facility: CLINIC | Age: 57
End: 2022-03-07
Payer: MEDICARE

## 2022-03-07 VITALS — HEIGHT: 65 IN | BODY MASS INDEX: 52.48 KG/M2 | WEIGHT: 315 LBS

## 2022-03-07 DIAGNOSIS — I10 HTN (HYPERTENSION), BENIGN: ICD-10-CM

## 2022-03-07 DIAGNOSIS — Z99.89 OSA ON CPAP: ICD-10-CM

## 2022-03-07 DIAGNOSIS — G47.33 OSA ON CPAP: ICD-10-CM

## 2022-03-07 DIAGNOSIS — E78.5 HYPERLIPIDEMIA LDL GOAL <70: Primary | ICD-10-CM

## 2022-03-07 DIAGNOSIS — I50.32 CHRONIC DIASTOLIC CHF (CONGESTIVE HEART FAILURE), NYHA CLASS 2 (HCC): ICD-10-CM

## 2022-03-07 PROCEDURE — 99442 PR PHYS/QHP TELEPHONE EVALUATION 11-20 MIN: CPT | Performed by: INTERNAL MEDICINE

## 2022-03-07 RX ORDER — ATORVASTATIN CALCIUM 20 MG/1
20 TABLET, FILM COATED ORAL DAILY
Qty: 30 TABLET | Refills: 5 | Status: SHIPPED | OUTPATIENT
Start: 2022-03-07

## 2022-06-21 DIAGNOSIS — I50.22 CHRONIC SYSTOLIC CHF (CONGESTIVE HEART FAILURE), NYHA CLASS 2 (HCC): ICD-10-CM

## 2022-06-21 DIAGNOSIS — I10 HTN (HYPERTENSION), BENIGN: ICD-10-CM

## 2022-06-21 RX ORDER — HYDRALAZINE HYDROCHLORIDE 25 MG/1
TABLET, FILM COATED ORAL
Qty: 270 TABLET | Refills: 3 | Status: SHIPPED | OUTPATIENT
Start: 2022-06-21

## 2022-06-21 RX ORDER — CARVEDILOL 12.5 MG/1
12.5 TABLET ORAL 2 TIMES DAILY
Qty: 180 TABLET | Refills: 3 | Status: SHIPPED | OUTPATIENT
Start: 2022-06-21

## 2022-06-21 NOTE — TELEPHONE ENCOUNTER
Requested medication(s) are due for refill today: Yes  Patient has already received a courtesy refill: No  Other reason request has been forwarded to provider: Angeles snell

## 2022-06-29 ENCOUNTER — OFFICE VISIT (OUTPATIENT)
Dept: SLEEP CENTER | Facility: CLINIC | Age: 57
End: 2022-06-29
Payer: MEDICARE

## 2022-06-29 VITALS
DIASTOLIC BLOOD PRESSURE: 88 MMHG | HEART RATE: 79 BPM | WEIGHT: 315 LBS | BODY MASS INDEX: 52.48 KG/M2 | HEIGHT: 65 IN | SYSTOLIC BLOOD PRESSURE: 140 MMHG | OXYGEN SATURATION: 97 %

## 2022-06-29 DIAGNOSIS — G47.33 OSA (OBSTRUCTIVE SLEEP APNEA): Primary | ICD-10-CM

## 2022-06-29 PROCEDURE — 99213 OFFICE O/P EST LOW 20 MIN: CPT | Performed by: INTERNAL MEDICINE

## 2022-06-29 NOTE — PROGRESS NOTES
Review of Systems      Genitourinary need to urinate more than twice a night   Cardiology palpitations/fluttering feeling in the chest and ankle/leg swelling   Gastrointestinal none   Neurology frequent headaches, forgetfulness, poor concentration or confusion,  and difficulty with memory   Constitutional fatigue and excessive sweating at night   Integumentary none   Psychiatry depression   Musculoskeletal joint pain and leg cramps   Pulmonary shortness of breath with activity   ENT none   Endocrine excessive thirst and frequent urination   Hematological none

## 2022-06-30 ENCOUNTER — TELEPHONE (OUTPATIENT)
Dept: SLEEP CENTER | Facility: CLINIC | Age: 57
End: 2022-06-30

## 2022-07-02 ENCOUNTER — APPOINTMENT (OUTPATIENT)
Dept: LAB | Facility: CLINIC | Age: 57
End: 2022-07-02
Payer: MEDICARE

## 2022-07-02 DIAGNOSIS — E78.2 MIXED HYPERLIPIDEMIA: ICD-10-CM

## 2022-07-02 LAB
ALBUMIN SERPL BCP-MCNC: 3.9 G/DL (ref 3.5–5)
ALP SERPL-CCNC: 70 U/L (ref 34–104)
ALT SERPL W P-5'-P-CCNC: 37 U/L (ref 7–52)
ANION GAP SERPL CALCULATED.3IONS-SCNC: 8 MMOL/L (ref 4–13)
AST SERPL W P-5'-P-CCNC: 28 U/L (ref 13–39)
BILIRUB SERPL-MCNC: 0.7 MG/DL (ref 0.2–1)
BUN SERPL-MCNC: 25 MG/DL (ref 5–25)
CALCIUM SERPL-MCNC: 9.4 MG/DL (ref 8.4–10.2)
CHLORIDE SERPL-SCNC: 101 MMOL/L (ref 96–108)
CHOLEST SERPL-MCNC: 125 MG/DL
CO2 SERPL-SCNC: 31 MMOL/L (ref 21–32)
CREAT SERPL-MCNC: 1.15 MG/DL (ref 0.6–1.3)
EST. AVERAGE GLUCOSE BLD GHB EST-MCNC: 117 MG/DL
GFR SERPL CREATININE-BSD FRML MDRD: 70 ML/MIN/1.73SQ M
GLUCOSE P FAST SERPL-MCNC: 127 MG/DL (ref 65–99)
HBA1C MFR BLD: 5.7 %
HDLC SERPL-MCNC: 33 MG/DL
LDLC SERPL CALC-MCNC: 59 MG/DL (ref 0–100)
NONHDLC SERPL-MCNC: 92 MG/DL
POTASSIUM SERPL-SCNC: 4.5 MMOL/L (ref 3.5–5.3)
PROT SERPL-MCNC: 7.9 G/DL (ref 6.4–8.4)
SODIUM SERPL-SCNC: 140 MMOL/L (ref 135–147)
TRIGL SERPL-MCNC: 165 MG/DL

## 2022-07-02 PROCEDURE — 80053 COMPREHEN METABOLIC PANEL: CPT

## 2022-07-02 PROCEDURE — 80061 LIPID PANEL: CPT

## 2022-07-02 PROCEDURE — 83036 HEMOGLOBIN GLYCOSYLATED A1C: CPT

## 2022-07-02 PROCEDURE — 36415 COLL VENOUS BLD VENIPUNCTURE: CPT

## 2022-08-18 DIAGNOSIS — I50.22 CHRONIC SYSTOLIC CHF (CONGESTIVE HEART FAILURE), NYHA CLASS 2 (HCC): ICD-10-CM

## 2022-08-18 RX ORDER — ISOSORBIDE MONONITRATE 30 MG/1
30 TABLET, EXTENDED RELEASE ORAL DAILY
Qty: 90 TABLET | Refills: 0 | Status: SHIPPED | OUTPATIENT
Start: 2022-08-18

## 2022-08-28 DIAGNOSIS — E78.5 HYPERLIPIDEMIA LDL GOAL <70: ICD-10-CM

## 2022-08-29 RX ORDER — ATORVASTATIN CALCIUM 20 MG/1
TABLET, FILM COATED ORAL
Qty: 90 TABLET | Refills: 1 | Status: SHIPPED | OUTPATIENT
Start: 2022-08-29

## 2022-09-26 DIAGNOSIS — I50.22 CHRONIC SYSTOLIC CHF (CONGESTIVE HEART FAILURE), NYHA CLASS 2 (HCC): ICD-10-CM

## 2022-09-26 DIAGNOSIS — I42.8 NICM (NONISCHEMIC CARDIOMYOPATHY) (HCC): ICD-10-CM

## 2022-09-26 RX ORDER — TORSEMIDE 20 MG/1
40 TABLET ORAL 2 TIMES DAILY
Qty: 360 TABLET | Refills: 3 | Status: SHIPPED | OUTPATIENT
Start: 2022-09-26

## 2022-10-12 NOTE — PROGRESS NOTES
Heart Failure Outpatient Progress Note - Lizzette Velazquez 64 y o  male MRN: [id number]    @ Encounter: [id number]      Assessment/Plan:    Patient Active Problem List    Diagnosis Date Noted   • Acute on chronic combined systolic and diastolic congestive heart failure ([facility] ) 06/29/2017   • Sepsis ([facility] ) 06/29/2017   • Urinary tract infection 06/29/2017   • Essential hypertension 06/29/2017   • Morbid obesity due to excess calories ([place] [id number] ) 06/29/2017   • VIRA (acute kidney injury) ([place] [id number] ) 06/29/2017       Assessment/Plan:  # Chronic systolic Heart failure, Stage C, NYHA 2  Etiology: NICM- obesity, HTN     Weight: 388 lbs - down from 402 lbs  NT proBNP: 3/7/20: 348     Studies- personally reviewed by me     Echo 6/13/19:  LVEF: 50%, grade 2 DD  LVIDd: 6 2 cm  RV: normal  MR: mild     Echocardiogram 11/16/17  LVEF: 35%  LVIDd: 6 5  RV: dilated     Echo June 2017  LVEF: 35%     Neurohormonal Blockade:  --Beta-Blocker: coreg 12 5 mg BID  --ACEi, ARB or ARNi: hydralazine 25 mg TID, Imdur 30 mg daily    (or SVR reduction)  --Aldosterone Receptor Blocker: gynecomastia from spironolactone and inspra so off of them now  --Diuretic: torsemide 80 mg daily, K 20 meq daily     Sudden Cardiac Death Risk Reduction:  --ICD: EF now around 50%     Electrical Resynchronization:  --narrow QRS     Advanced Therapies (If appropriate): --Inotrope:  --LVAD/Transplant Candidacy:     # Morbid obesity- BMI 66, recommend gastric bypass eval  # hyperlipidemia - atorvastatin 20 mg   7/2/22: LDL 59, HDL 33, Tri 165- post statin  12/4/21: Tri 177, HDL 35,  - pre statin  9/25: , HDL 32  # HTN  # KYRIE- BiPAP  # CKD, Cr 1 15 on 7/2/22     TODAY'S PLAN:  Continue lasix 80 mg daily for HF  LDL much better from 142 to 59 with statin  Continue BiPap for KYRIE  Echo follow up  --2g sodium diet  - Daily weights     HPI:   62 yo male presents for follow up of systolic heart failure  He follows with Dr Cheo Galindo for BiV heart failure, obesity and KYRIE   He states he did not have medical insurance for 2 5 years and then got Medicare, went to see his family doctor who sent him to ER due to his volume overload  He had not been on medicines  He is down 24 lbs since his admission  He reports he had a cardiac cath back around 2010 which was negative  He was discharged from the hospital on coreg 6 25 mg BID, hydralazine and imdur  His last Cr was 1 39  He was discharged on lasix 40 mg BID  HE is low sodium in his diet  He had lost 12 more lbs since his discharge  He is getting cramping      On my last follow up his weight was up more as drinking too much so I educated about amount to drink  Also discussed gastric bypass as BMI was 67  Also continued to recommend ICD for primary prevention       Interval History:  Weight is down a little  No new changes  Changed his diet, lower carbs and sugars  Sometimes misses his torsemide dose  Review of Systems   Constitutional: Negative for activity change, appetite change, fatigue and unexpected weight change (wt down 12 lbs)  HENT: Negative for congestion and nosebleeds  Eyes: Negative  Respiratory: Negative for cough, chest tightness and shortness of breath  Cardiovascular: Negative for chest pain, palpitations and leg swelling  Gastrointestinal: Negative for abdominal distention  Endocrine: Negative  Genitourinary: Negative  Musculoskeletal: Negative  Skin: Negative  Neurological: Negative for dizziness, syncope and weakness  Hematological: Negative  Psychiatric/Behavioral: Negative  Past Medical History:   Diagnosis Date   • CHF (congestive heart failure) (HCC)    • Hx of cardiac cath    • Hypertension    • KYRIE treated with BiPAP          Allergies   Allergen Reactions   • Penicillins            Current Outpatient Medications:   •  aspirin 81 mg chewable tablet, Chew 1 tablet daily, Disp: , Rfl: 0  •  atorvastatin (LIPITOR) 20 mg tablet, TAKE 1 TABLET BY MOUTH EVERY DAY, Disp: 90 tablet, Rfl: 1  •  carvedilol (COREG) 12 5 mg tablet, TAKE 1 TABLET (12 5 MG TOTAL) BY MOUTH 2 (TWO) TIMES A DAY, Disp: 180 tablet, Rfl: 3  •  clotrimazole-betamethasone (LOTRISONE) 1-0 05 % cream, Apply topically 2 (two) times a day, Disp: , Rfl:   •  diclofenac (VOLTAREN) 75 mg EC tablet, , Disp: , Rfl: 1  •  fluticasone (FLONASE) 50 mcg/act nasal spray, 1 spray into each nostril daily, Disp: , Rfl:   •  hydrALAZINE (APRESOLINE) 25 mg tablet, TAKE 1 TABLET BY MOUTH THREE TIMES A DAY, Disp: 270 tablet, Rfl: 3  •  isosorbide mononitrate (IMDUR) 30 mg 24 hr tablet, Take 1 tablet (30 mg total) by mouth daily, Disp: 90 tablet, Rfl: 0  •  Klor-Con M20 20 MEQ tablet, TAKE 2 TABLETS BY MOUTH EVERY DAY, Disp: 180 tablet, Rfl: 3  •  Magnesium 500 MG TABS, Take 1 capsule by mouth daily , Disp: , Rfl:   •  naproxen (NAPROSYN) 500 mg tablet, Take 1 tablet (500 mg total) by mouth 2 (two) times a day with meals for 7 days (Patient taking differently: Take 500 mg by mouth as needed  ), Disp: 14 tablet, Rfl: 0  •  tamsulosin (FLOMAX) 0 4 mg, TAKE 1 CAPSULE BY MOUTH EVERY DAY AT NIGHT, Disp: , Rfl:   •  torsemide (DEMADEX) 20 mg tablet, TAKE 2 TABLETS (40 MG TOTAL) BY MOUTH 2 (TWO) TIMES A DAY, Disp: 360 tablet, Rfl: 3    Social History     Socioeconomic History   • Marital status: /Civil Union     Spouse name: Not on file   • Number of children: Not on file   • Years of education: Not on file   • Highest education level: Not on file   Occupational History   • Not on file   Tobacco Use   • Smoking status: Never Smoker   • Smokeless tobacco: Never Used   Vaping Use   • Vaping Use: Never used   Substance and Sexual Activity   • Alcohol use: No   • Drug use: No   • Sexual activity: Not on file   Other Topics Concern   • Not on file   Social History Narrative   • Not on file     Social Determinants of Health     Financial Resource Strain: Not on file   Food Insecurity: Not on file   Transportation Needs: Not on file   Physical Activity: Not on file   Stress: Not on file   Social Connections: Not on file   Intimate Partner Violence: Not on file   Housing Stability: Not on file       No family history on file  Physical Exam:    Vitals:     Physical Exam    Labs & Results:    Lab Results   Component Value Date    SODIUM 140 07/02/2022    K 4 5 07/02/2022     07/02/2022    CO2 31 07/02/2022    BUN 25 07/02/2022    CREATININE 1 15 07/02/2022    GLUC 108 08/08/2017    CALCIUM 9 4 07/02/2022     Lab Results   Component Value Date    WBC 11 41 (H) 12/04/2021    HGB 14 6 12/04/2021    HCT 42 0 12/04/2021    MCV 95 12/04/2021     12/04/2021     Lab Results   Component Value Date    NTBNP 348 (H) 03/07/2020      Lab Results   Component Value Date    CHOLESTEROL 125 07/02/2022    CHOLESTEROL 212 (H) 12/04/2021    CHOLESTEROL 188 09/21/2019     Lab Results   Component Value Date    HDL 33 (L) 07/02/2022    HDL 35 (L) 12/04/2021    HDL 32 (L) 09/21/2019     Lab Results   Component Value Date    TRIG 165 (H) 07/02/2022    TRIG 177 (H) 12/04/2021    TRIG 156 (H) 09/21/2019     Lab Results   Component Value Date    [place] 92 07/02/2022    [place] 177 12/04/2021    [place] 156 09/21/2019       EKG personally reviewed by Michael Moran  Counseling / Coordination of Care  Time spent today 25 minutes  Greater than 50% of total time was spent with the patient and / or family counseling and / or coordination of care  We went over current diagnosis, most recent studies and any changes in treatment  Thank you for the opportunity to participate in the care of this patient      [facility] PULMONARY HYPERTENSION  MEDICAL DIRECTOR OF [place]

## 2022-10-13 ENCOUNTER — OFFICE VISIT (OUTPATIENT)
Dept: CARDIOLOGY CLINIC | Facility: CLINIC | Age: 57
End: 2022-10-13
Payer: MEDICARE

## 2022-10-13 VITALS
DIASTOLIC BLOOD PRESSURE: 78 MMHG | HEART RATE: 74 BPM | HEIGHT: 65 IN | WEIGHT: 315 LBS | SYSTOLIC BLOOD PRESSURE: 120 MMHG | OXYGEN SATURATION: 95 % | BODY MASS INDEX: 52.48 KG/M2

## 2022-10-13 DIAGNOSIS — I50.22 CHRONIC SYSTOLIC CHF (CONGESTIVE HEART FAILURE), NYHA CLASS 2 (HCC): Primary | ICD-10-CM

## 2022-10-13 DIAGNOSIS — E66.01 MORBID OBESITY DUE TO EXCESS CALORIES (HCC): Chronic | ICD-10-CM

## 2022-10-13 DIAGNOSIS — I10 ESSENTIAL HYPERTENSION: Chronic | ICD-10-CM

## 2022-10-13 DIAGNOSIS — E78.5 DYSLIPIDEMIA: ICD-10-CM

## 2022-10-13 PROCEDURE — 99214 OFFICE O/P EST MOD 30 MIN: CPT | Performed by: INTERNAL MEDICINE

## 2022-11-26 DIAGNOSIS — I50.22 CHRONIC SYSTOLIC CHF (CONGESTIVE HEART FAILURE), NYHA CLASS 2 (HCC): ICD-10-CM

## 2022-11-26 DIAGNOSIS — I42.8 NICM (NONISCHEMIC CARDIOMYOPATHY) (HCC): ICD-10-CM

## 2022-11-26 DIAGNOSIS — I50.42 CHRONIC COMBINED SYSTOLIC AND DIASTOLIC CHF, NYHA CLASS 2 (HCC): ICD-10-CM

## 2022-11-28 RX ORDER — POTASSIUM CHLORIDE 1500 MG/1
TABLET, EXTENDED RELEASE ORAL
Qty: 180 TABLET | Refills: 3 | Status: SHIPPED | OUTPATIENT
Start: 2022-11-28

## 2023-01-11 ENCOUNTER — HOSPITAL ENCOUNTER (OUTPATIENT)
Dept: NON INVASIVE DIAGNOSTICS | Facility: CLINIC | Age: 58
Discharge: HOME | End: 2023-01-11

## 2023-01-11 VITALS
HEART RATE: 74 BPM | HEIGHT: 65 IN | SYSTOLIC BLOOD PRESSURE: 120 MMHG | BODY MASS INDEX: 52.48 KG/M2 | DIASTOLIC BLOOD PRESSURE: 78 MMHG | WEIGHT: 315 LBS

## 2023-01-11 DIAGNOSIS — I50.22 CHRONIC SYSTOLIC CHF (CONGESTIVE HEART FAILURE), NYHA CLASS 2 (HCC): ICD-10-CM

## 2023-01-11 LAB
AORTIC ROOT: 3.4 CM
APICAL FOUR CHAMBER EJECTION FRACTION: 60 %
ASCENDING AORTA: 3.3 CM
E WAVE DECELERATION TIME: 121 MS
FRACTIONAL SHORTENING: 31 % (ref 28–44)
INTERVENTRICULAR SEPTUM IN DIASTOLE (PARASTERNAL SHORT AXIS VIEW): 1.4 CM
INTERVENTRICULAR SEPTUM: 1.4 CM (ref 0.6–1.1)
LAAS-AP2: 18.6 CM2
LAAS-AP4: 22.2 CM2
LEFT ATRIUM SIZE: 4.9 CM
LEFT INTERNAL DIMENSION IN SYSTOLE: 4.1 CM (ref 2.1–4)
LEFT VENTRICULAR INTERNAL DIMENSION IN DIASTOLE: 5.9 CM (ref 3.5–6)
LEFT VENTRICULAR POSTERIOR WALL IN END DIASTOLE: 1.2 CM
LEFT VENTRICULAR STROKE VOLUME: 96 ML
LVSV (TEICH): 96 ML
MV E'TISSUE VEL-SEP: 8 CM/S
MV PEAK A VEL: 0.6 M/S
MV PEAK E VEL: 42 CM/S
MV STENOSIS PRESSURE HALF TIME: 35 MS
MV VALVE AREA P 1/2 METHOD: 6.29 CM2
PV PEAK GRADIENT: 11 MMHG
RIGHT ATRIUM AREA SYSTOLE A4C: 23.4 CM2
SL CV LEFT ATRIUM LENGTH A2C: 5.5 CM
SL CV PED ECHO LEFT VENTRICLE DIASTOLIC VOLUME (MOD BIPLANE) 2D: 170 ML
SL CV PED ECHO LEFT VENTRICLE SYSTOLIC VOLUME (MOD BIPLANE) 2D: 75 ML
TRICUSPID ANNULAR PLANE SYSTOLIC EXCURSION: 1.9 CM
TRICUSPID VALVE PEAK REGURGITATION VELOCITY: 2.2 M/S

## 2023-01-11 RX ADMIN — PERFLUTREN 0.4 ML/MIN: 6.52 INJECTION, SUSPENSION INTRAVENOUS at 15:47

## 2023-02-16 DIAGNOSIS — E78.5 HYPERLIPIDEMIA LDL GOAL <70: ICD-10-CM

## 2023-02-16 RX ORDER — ATORVASTATIN CALCIUM 20 MG/1
TABLET, FILM COATED ORAL
Qty: 90 TABLET | Refills: 1 | Status: SHIPPED | OUTPATIENT
Start: 2023-02-16

## 2023-03-04 ENCOUNTER — APPOINTMENT (OUTPATIENT)
Dept: LAB | Facility: CLINIC | Age: 58
End: 2023-03-04

## 2023-03-04 DIAGNOSIS — E78.5 HYPERLIPIDEMIA, UNSPECIFIED HYPERLIPIDEMIA TYPE: ICD-10-CM

## 2023-03-04 DIAGNOSIS — R73.01 IMPAIRED FASTING GLUCOSE: ICD-10-CM

## 2023-03-04 LAB
ALBUMIN SERPL BCP-MCNC: 4 G/DL (ref 3.5–5)
ALP SERPL-CCNC: 77 U/L (ref 34–104)
ALT SERPL W P-5'-P-CCNC: 34 U/L (ref 7–52)
ANION GAP SERPL CALCULATED.3IONS-SCNC: 7 MMOL/L (ref 4–13)
AST SERPL W P-5'-P-CCNC: 19 U/L (ref 13–39)
BILIRUB SERPL-MCNC: 0.75 MG/DL (ref 0.2–1)
BUN SERPL-MCNC: 26 MG/DL (ref 5–25)
CALCIUM SERPL-MCNC: 9.3 MG/DL (ref 8.4–10.2)
CHLORIDE SERPL-SCNC: 102 MMOL/L (ref 96–108)
CHOLEST SERPL-MCNC: 108 MG/DL
CO2 SERPL-SCNC: 29 MMOL/L (ref 21–32)
CREAT SERPL-MCNC: 1.1 MG/DL (ref 0.6–1.3)
EST. AVERAGE GLUCOSE BLD GHB EST-MCNC: 103 MG/DL
GFR SERPL CREATININE-BSD FRML MDRD: 74 ML/MIN/1.73SQ M
GLUCOSE P FAST SERPL-MCNC: 92 MG/DL (ref 65–99)
HBA1C MFR BLD: 5.2 %
HDLC SERPL-MCNC: 36 MG/DL
LDLC SERPL CALC-MCNC: 48 MG/DL (ref 0–100)
NONHDLC SERPL-MCNC: 72 MG/DL
POTASSIUM SERPL-SCNC: 4 MMOL/L (ref 3.5–5.3)
PROT SERPL-MCNC: 7.9 G/DL (ref 6.4–8.4)
SODIUM SERPL-SCNC: 138 MMOL/L (ref 135–147)
TRIGL SERPL-MCNC: 120 MG/DL

## 2023-05-05 NOTE — PROGRESS NOTES
Heart Failure Outpatient Progress Note - Paloma Benjamin 62 y o  male MRN: [id number]    @ Encounter: [id number]      Assessment/Plan:    Patient Active Problem List    Diagnosis Date Noted   • Acute on chronic combined systolic and diastolic congestive heart failure ([place] [address] ) 06/29/2017   • Sepsis ([place] [address] ) 06/29/2017   • Urinary tract infection 06/29/2017   • Essential hypertension 06/29/2017   • Morbid obesity due to excess calories ([place] [address] ) 06/29/2017   • VIRA (acute kidney injury) ([place] [address] ) 06/29/2017       Assessment/Plan:  # Chronic systolic Heart failure, Stage C, NYHA 2  Etiology: NICM- obesity, HTN     Weight: 378 lbs  NT proBNP: 3/7/20: 348     Studies- personally reviewed by me  Echo 1/11/23:  LVEF: 60%  RV: mildly dilated, normal function     Echo 6/13/19:  LVEF: 50%, grade 2 DD  LVIDd: 6 2 cm  RV: normal  MR: mild     Echocardiogram 11/16/17  LVEF: 35%  LVIDd: 6 5  RV: dilated     Echo June 2017  LVEF: 35%     Neurohormonal Blockade:  --Beta-Blocker: coreg 12 5 mg BID  --ACEi, ARB or ARNi: hydralazine 25 mg TID, Imdur 30 mg daily    (or SVR reduction)  --Aldosterone Receptor Blocker: gynecomastia from spironolactone and inspra so off of them now  --Diuretic: torsemide 80 mg daily, K 20 meq daily     Sudden Cardiac Death Risk Reduction:  --ICD: EF now around 50%     Electrical Resynchronization:  --narrow QRS     Advanced Therapies (If appropriate): --Inotrope:  --LVAD/Transplant Candidacy:     # Morbid obesity- BMI 64, recommend gastric bypass eval  # hyperlipidemia - atorvastatin 20 mg   3/4/23: LDL 48, HDL 36, Tri 165  7/2/22: LDL 59, HDL 33, Tri 165- post statin  12/4/21: Tri 177, HDL 35,  - pre statin  9/25: , HDL 32  # HTN  # KYRIE- BiPAP  # CKD, Cr 1 1 on 3/4/23     TODAY'S PLAN:  Continue lasix 80 mg daily for HF  LDL much better from 142 to 48 with statin  Continue BiPap for KYRIE    --2g sodium diet  - Daily weights     HPI:   63 yo male presents for follow up of systolic heart failure   He follows with Dr Kalli Alves for BiV heart failure, obesity and KYRIE  He states he did not have medical insurance for 2 5 years and then got Medicare, went to see his family doctor who sent him to ER due to his volume overload  He had not been on medicines  He is down 24 lbs since his admission  He reports he had a cardiac cath back around 2010 which was negative  He was discharged from the hospital on coreg 6 25 mg BID, hydralazine and imdur  His last Cr was 1 39  He was discharged on lasix 40 mg BID  HE is low sodium in his diet  He had lost 12 more lbs since his discharge  He is getting cramping      On my last follow up his weight was up more as drinking too much so I educated about amount to drink  Also discussed gastric bypass as BMI was 67  Also continued to recommend ICD for primary prevention       Interval History:  F/u on wt loss, diet changes  Echo 1/11/23:  LVEF: 60%  RV: mildly dilated, normal function    Lost 10 lbs more      Review of Systems   Constitutional: Negative for activity change, appetite change, fatigue and unexpected weight change (wt down 10 lbs)  HENT: Negative for congestion and nosebleeds  Eyes: Negative  Respiratory: Negative for cough, chest tightness and shortness of breath  Cardiovascular: Negative for chest pain, palpitations and leg swelling  Gastrointestinal: Negative for abdominal distention  Endocrine: Negative  Genitourinary: Negative  Musculoskeletal: Negative  Skin: Negative  Neurological: Negative for dizziness, syncope and weakness  Hematological: Negative  Psychiatric/Behavioral: Negative  Past Medical History:   Diagnosis Date   • CHF (congestive heart failure) (HCC)    • Hx of cardiac cath    • Hypertension    • KYRIE treated with BiPAP          Allergies   Allergen Reactions   • Penicillins            Current Outpatient Medications:   •  aspirin 81 mg chewable tablet, Chew 1 tablet daily, Disp: , Rfl: 0  •  atorvastatin (LIPITOR) 20 mg tablet, TAKE 1 TABLET BY MOUTH EVERY DAY, Disp: 90 tablet, Rfl: 1  •  carvedilol (COREG) 12 5 mg tablet, TAKE 1 TABLET (12 5 MG TOTAL) BY MOUTH 2 (TWO) TIMES A DAY, Disp: 180 tablet, Rfl: 3  •  clotrimazole-betamethasone (LOTRISONE) 1-0 05 % cream, Apply topically 2 (two) times a day, Disp: , Rfl:   •  diclofenac (VOLTAREN) 75 mg EC tablet, , Disp: , Rfl: 1  •  fluticasone (FLONASE) 50 mcg/act nasal spray, 1 spray into each nostril daily (Patient not taking: Reported on 10/13/2022), Disp: , Rfl:   •  hydrALAZINE (APRESOLINE) 25 mg tablet, TAKE 1 TABLET BY MOUTH THREE TIMES A DAY, Disp: 270 tablet, Rfl: 3  •  isosorbide mononitrate (IMDUR) 30 mg 24 hr tablet, Take 1 tablet (30 mg total) by mouth daily, Disp: 90 tablet, Rfl: 0  •  Klor-Con M20 20 MEQ tablet, TAKE 2 TABLETS BY MOUTH EVERY DAY, Disp: 180 tablet, Rfl: 3  •  Magnesium 500 MG TABS, Take 1 capsule by mouth daily , Disp: , Rfl:   •  naproxen (NAPROSYN) 500 mg tablet, Take 1 tablet (500 mg total) by mouth 2 (two) times a day with meals for 7 days (Patient taking differently: Take 500 mg by mouth as needed), Disp: 14 tablet, Rfl: 0  •  tamsulosin (FLOMAX) 0 4 mg, TAKE 1 CAPSULE BY MOUTH EVERY DAY AT NIGHT (Patient not taking: Reported on 10/13/2022), Disp: , Rfl:   •  torsemide (DEMADEX) 20 mg tablet, TAKE 2 TABLETS (40 MG TOTAL) BY MOUTH 2 (TWO) TIMES A DAY, Disp: 360 tablet, Rfl: 3    Social History     Socioeconomic History   • Marital status: /Civil Union     Spouse name: Not on file   • Number of children: Not on file   • Years of education: Not on file   • Highest education level: Not on file   Occupational History   • Not on file   Tobacco Use   • Smoking status: Never   • Smokeless tobacco: Never   Vaping Use   • Vaping Use: Never used   Substance and Sexual Activity   • Alcohol use: No   • Drug use: No   • Sexual activity: Not on file   Other Topics Concern   • Not on file   Social History Narrative   • Not on file     Social Determinants of Health     Financial Resource Strain: Not on file   Food Insecurity: Not on file   Transportation Needs: Not on file   Physical Activity: Not on file   Stress: Not on file   Social Connections: Not on file   Intimate Partner Violence: Not on file   Housing Stability: Not on file       No family history on file  Physical Exam:    Vitals:     Physical Exam    Labs & Results:    Lab Results   Component Value Date    SODIUM 138 03/04/2023    K 4 0 03/04/2023     03/04/2023    CO2 29 03/04/2023    BUN 26 (H) 03/04/2023    CREATININE 1 10 03/04/2023    GLUC 108 08/08/2017    CALCIUM 9 3 03/04/2023     Lab Results   Component Value Date    WBC 11 41 (H) 12/04/2021    HGB 14 6 12/04/2021    HCT 42 0 12/04/2021    MCV 95 12/04/2021     12/04/2021     Lab Results   Component Value Date    NTBNP 348 (H) 03/07/2020      Lab Results   Component Value Date    CHOLESTEROL 108 03/04/2023    CHOLESTEROL 125 07/02/2022    CHOLESTEROL 212 (H) 12/04/2021     Lab Results   Component Value Date    HDL 36 (L) 03/04/2023    HDL 33 (L) 07/02/2022    HDL 35 (L) 12/04/2021     Lab Results   Component Value Date    TRIG 120 03/04/2023    TRIG 165 (H) 07/02/2022    TRIG 177 (H) 12/04/2021     Lab Results   Component Value Date    NONHDLC 72 03/04/2023    [place] 92 07/02/2022    [place] 177 12/04/2021       EKG personally reviewed by Tahira Silva  Counseling / Coordination of Care  Time spent today 25 minutes  Greater than 50% of total time was spent with the patient and / or family counseling and / or coordination of care  We went over current diagnosis, most recent studies and any changes in treatment  Thank you for the opportunity to participate in the care of this patient      [address] [facility] PULMONARY HYPERTENSION  MEDICAL DIRECTOR OF [place]

## 2023-05-08 ENCOUNTER — OFFICE VISIT (OUTPATIENT)
Dept: CARDIOLOGY CLINIC | Facility: CLINIC | Age: 58
End: 2023-05-08

## 2023-05-08 VITALS
DIASTOLIC BLOOD PRESSURE: 70 MMHG | BODY MASS INDEX: 52.48 KG/M2 | SYSTOLIC BLOOD PRESSURE: 158 MMHG | WEIGHT: 315 LBS | OXYGEN SATURATION: 98 % | HEART RATE: 78 BPM | HEIGHT: 65 IN

## 2023-05-08 DIAGNOSIS — E66.01 MORBID OBESITY DUE TO EXCESS CALORIES (HCC): Chronic | ICD-10-CM

## 2023-05-08 DIAGNOSIS — I10 ESSENTIAL HYPERTENSION: Primary | Chronic | ICD-10-CM

## 2023-05-08 DIAGNOSIS — I50.32 CHRONIC DIASTOLIC CHF (CONGESTIVE HEART FAILURE), NYHA CLASS 2 (HCC): ICD-10-CM

## 2023-07-24 ENCOUNTER — OFFICE VISIT (OUTPATIENT)
Age: 58
End: 2023-07-24
Payer: MEDICARE

## 2023-07-24 VITALS
SYSTOLIC BLOOD PRESSURE: 118 MMHG | TEMPERATURE: 98.2 F | BODY MASS INDEX: 53.78 KG/M2 | DIASTOLIC BLOOD PRESSURE: 68 MMHG | HEIGHT: 64 IN | WEIGHT: 315 LBS | OXYGEN SATURATION: 95 % | HEART RATE: 62 BPM

## 2023-07-24 DIAGNOSIS — E66.01 MORBID OBESITY DUE TO EXCESS CALORIES (HCC): Chronic | ICD-10-CM

## 2023-07-24 DIAGNOSIS — G47.33 OSA (OBSTRUCTIVE SLEEP APNEA): Primary | ICD-10-CM

## 2023-07-24 PROCEDURE — 99214 OFFICE O/P EST MOD 30 MIN: CPT | Performed by: INTERNAL MEDICINE

## 2023-07-24 NOTE — PATIENT INSTRUCTIONS
BiPAP   AMBULATORY CARE:   Bilevel positive airway pressure (BiPAP)  is a treatment that uses mild air pressure to keep your airways open while you sleep. BiPAP is used to treat obstructive sleep apnea (KYRIE) in people who cannot tolerate CPAP treatment. It is also used in people with obesity hypoventilation syndrome, central apnea, or restrictive or obstructive lung problems. Difference between BiPAP and CPAP:  The BiPAP machine delivers a higher amount of air pressure when you breathe in than when you breathe out. CPAP delivers a constant level of air pressure during treatment. In both, the mask connects to the machine with a hose. Air pressure is delivered to the mask through the hose. Benefits of BiPAP:   Improves quality of sleep     Relieves daytime sleepiness     Improves memory    Reduces the risk of heart disease    Improves your mood and quality of life    Make BiPAP easier to use: At first, try to use your BiPAP for a few hours every night. Then slowly increase the length of time you use your machine. It takes time to adjust to BiPAP treatment. You may need a mask that is a different size, shape, or material.  Talk to your healthcare provider if your mask feels uncomfortable or irritates your skin. You may need to use a special moisturizer made for users. Use a saline nasal spray at bedtime to help relieve nasal irritation. A chin strap to help keep your mouth closed or a different type of mask can help dry mouth. Some machines come with a heated humidifier to help relieve these symptoms. Talk to your healthcare provider if you are having problems adjusting to the air pressure. He or she can tell you how to adjust the air pressure on your BiPAP. You may need to start at a lower pressure and slowly increase it over time.     Call your healthcare provider for any of the following:   Continued sleepiness during the day, even after wearing your BiPAP device as directed    Continued problems caused by BiPAP that do not improve    Questions or concerns about your condition, care, or equipment. Follow up with your healthcare provider as directed:  Tell your healthcare provider if your mask no longer fits properly. Write down your questions so you remember to ask them during your visits. © Copyright [employer] 2022 Information is for End User's use only and may not be sold, redistributed or otherwise used for commercial purposes. The above information is an  only. It is not intended as medical advice for individual conditions or treatments. Talk to your doctor, nurse or pharmacist before following any medical regimen to see if it is safe and effective for you.

## 2023-07-24 NOTE — PROGRESS NOTES
Progress Note - Sleep Medicine  Jackqulyn Landau 62 y.o. male MRN: [id number]       Impression & Plan:     Patient is a 63 yo M with PMH including combined systolic and diastolic heart failure, morbid obesity Body mass index is 65.47 kg/m² who presents for follow up on moderate KYRIE, with CPAP titration study in 3/2015 demonstrating AHI 18. He is currently utilizing DreamStation BiPAP device at 9-5 cm H20 with breath rate 8. At this time, patient is doing excellent with current device and settings. Patient reports using recalled device at present time. Discussed with patient how to register for [facility] new machine as this has been recalled. Provided serial number for device for registration. Will continue with present treatment at current device settings, however he is to follow up 2-3 months after he receives his new [place] device for re-evaluation and compliance check. 1. KYRIE (obstructive sleep apnea)  - PAP DME Resupply/Reorder  - Mask fitting only; Future    2. Morbid obesity due to excess calories [facility])  Discussed importance of regular exercise, eating a prudent diet to maintain long term weight goals. He is currently not interested in discussion with weight management or bariatric intervention at this time. ____________________________________________________________________    HPI:    Jackqulyn Landau is a 62 y.o. male with PMH including combined systolic and diastolic heart failure, morbid obesity Body mass index is 65.47 kg/m². He presents today for follow up of follow up. Patient underwent CPAP titration in 3/2015 demonstrating moderate KYRIE with AHI 18, he could not tolerate CPAP and was switched to BiPAP, but this caused frequent central apneas so a followup ASV titration study was completed. Patient reports using the device every night and with naps.  He notes that he has a very old device (received ~2015) and would like to receive a new device as he states he is currently using a recalled device. Patient is accompanied by spouse today. Denies any significant change in PMH, PSH, medications, or allergies  Denies weight change  Denies SOB, wheezing, chest pain, or peripheral edema    Wt Readings from Last 3 Encounters:   07/24/23 (!) 173 kg (381 lb 6.4 oz)   05/08/23 (!) 171 kg (378 lb)   01/11/23 (!) 176 kg (388 lb)      Patient reported issues with the following symptoms:   Mask leaking: no  Skin irritation from the mask: no  Pain or discomfort: no  Feeling of claustrophobia: no  Aerophagia: mild  Pressure intolerance: no  Nasal congestion or rhinorrhea: no  Nasal and oral dryness: no  Snoring with PAP: no    Using PAP every night/day: yes  Using PAP the entire duration of sleep: yes  Benefiting from PAP: yes    He goes to sleep at 12AM, wakes up at 5AM, sleep latency is 10minutes. Gets up 4-6x per night with nocturia, he will discuss this with his PCP. He does take 1x nap per day for 45 minutes. He notes excessive daytime fatigue, poor concentration, forgetfulness but this is ongoing since 2015 when he was first diagnosed with CHF. He does not feel better if using device vs not using device.     Driving while drowsy: no    [place]: 16/24    Social history updates:  Social History     Tobacco Use   Smoking Status Never   Smokeless Tobacco Never     Social History     Socioeconomic History   • Marital status: /Civil Union     Spouse name: Not on file   • Number of children: Not on file   • Years of education: Not on file   • Highest education level: Not on file   Occupational History   • Not on file   Tobacco Use   • Smoking status: Never   • Smokeless tobacco: Never   Vaping Use   • Vaping Use: Never used   Substance and Sexual Activity   • Alcohol use: No   • Drug use: No   • Sexual activity: Not Currently   Other Topics Concern   • Not on file   Social History Narrative   • Not on file     Social Determinants of Health     Financial Resource Strain: Not on file   Food Insecurity: Not on file   Transportation Needs: Not on file   Physical Activity: Not on file   Stress: Not on file   Social Connections: Not on file   Intimate Partner Violence: Not on file   Housing Stability: Not on file       PhysicalExamination:  Vitals:   /68 (BP Location: Left arm, Patient Position: Sitting, Cuff Size: Large)   Pulse 62   Temp 98.2 °F (36.8 °C)   Ht 5' 4" (1.626 m)   Wt (!) 173 kg (381 lb 6.4 oz)   SpO2 95%   BMI 65.47 kg/m²     Physical Exam:  General: Sitting in chair, awake alert and oriented to person, place, and time. No acute distress  HEENT: PERRL, nares patent, no craniofacial abnormalities. Mucous membranes, moist, no oral lesions, and normal dentition. Mallampati class IV, tonsils 2+  NECK: Neck circumference 19inches, Trachea midline, no accessory muscle use, and no stridor   CARDIAC: Regular rate and rhythm, no murmur appreciated   PULM: CTA bilaterally no wheezing, rhonchi or rales. No conversational dyspnea  EXT: No cyanosis, no clubbing, and +trace peripheral edema    NEURO: No focal neurologic deficits, moving all extremities appropriately    Diagnostic Data:  Labs:   I personally reviewed the most recent laboratory data pertinent to today's visit  Lab Results   Component Value Date     06/26/2015    K 4.0 03/04/2023    K 4.0 06/26/2015     03/04/2023     06/26/2015    CO2 29 03/04/2023    CO2 26 06/26/2015    BUN 26 (H) 03/04/2023    BUN 23 06/26/2015    CREATININE 1.10 03/04/2023    CREATININE 1.29 06/26/2015    GLUCOSE 107 06/26/2015    CALCIUM 9.3 03/04/2023    CALCIUM 8.7 06/26/2015     Lab Results   Component Value Date    TROPONINI 0.05 (H) 06/30/2017    TROPONINI 0.04 12/28/2014     Lab Results   Component Value Date    WBC 11.41 (H) 12/04/2021    WBC 8.98 06/26/2015    HGB 14.6 12/04/2021    HGB 14.8 06/26/2015    HCT 42.0 12/04/2021    HCT 42.8 06/26/2015    MCV 95 12/04/2021    MCV 88 06/26/2015     12/04/2021     06/26/2015       CBC: No results found for: "WBC", "HGB", "HCT", "MCV", "PLT", "ADJUSTEDWBC", "RBC", "MCH", "MCHC", "RDW", "MPV", "NRBC", CMP: No results found for: "SODIUM", "K", "CL", "CO2", "ANIONGAP", "BUN", "CREATININE", "GLUCOSE", "CALCIUM", "AST", "ALT", "ALKPHOS", "PROT", "BILITOT", "EGFR"  Lab Results   Component Value Date    WBC 11.41 (H) 12/04/2021    HGB 14.6 12/04/2021    HCT 42.0 12/04/2021    MCV 95 12/04/2021     12/04/2021     Lab Results   Component Value Date    GLUCOSE 107 06/26/2015    CALCIUM 9.3 03/04/2023     06/26/2015    K 4.0 03/04/2023    CO2 29 03/04/2023     03/04/2023    BUN 26 (H) 03/04/2023    CREATININE 1.10 03/04/2023     No results found for: "IGE"  Lab Results   Component Value Date    ALT 34 03/04/2023    AST 19 03/04/2023    ALKPHOS 77 03/04/2023    BILITOT 0.5 06/26/2015     No results found for: "IRON", "TIBC", "FERRITIN"  Lab Results   Component Value Date    [id number] 690 06/26/2015     Lab Results   Component Value Date    FOLATE 7.3 06/26/2015       Compliance Data:  Type of CPAP:  DreamStation BiPAP S/T 30 V1                                   Percent usage: 76.7% (23/30 days) >= 4 hours                                    Time in large leak: 0%                                   Residual AHI: 0.3                                       [id number]  Sleep Medicine Fellow

## 2023-07-25 ENCOUNTER — TELEPHONE (OUTPATIENT)
Dept: SLEEP CENTER | Facility: CLINIC | Age: 58
End: 2023-07-25

## 2023-07-27 LAB

## 2023-08-03 NOTE — TELEPHONE ENCOUNTER
Pt's wife called [place] ([place]) office saying the supplies they received are for the wrong machine. Pt has the Dream Machine BiPAP.

## 2023-08-03 NOTE — TELEPHONE ENCOUNTER
Returned the patient's wife's call. Left call back message  message that I will send E mail to [facility] [employer] liaisons to contact her about wrong supplies.  Also left phone number for [facility] [employer]

## 2023-08-16 DIAGNOSIS — E78.5 HYPERLIPIDEMIA LDL GOAL <70: ICD-10-CM

## 2023-08-16 RX ORDER — ATORVASTATIN CALCIUM 20 MG/1
TABLET, FILM COATED ORAL
Qty: 90 TABLET | Refills: 1 | Status: SHIPPED | OUTPATIENT
Start: 2023-08-16

## 2023-11-17 NOTE — PROGRESS NOTES
Heart Failure Outpatient Progress Note - Gus Delong 62 y.o. male MRN: [id number]    @ Encounter: [id number]      Assessment/Plan:    Patient Active Problem List    Diagnosis Date Noted    Acute on chronic combined systolic and diastolic congestive heart failure ([address]) 06/29/2017    Sepsis ([address]) 06/29/2017    Urinary tract infection 06/29/2017    Essential hypertension 06/29/2017    Morbid obesity due to excess calories ([address]) 06/29/2017    VIRA (acute kidney injury) ([address]) 06/29/2017    KYRIE (obstructive sleep apnea) 07/24/2023       Assessment/Plan:  # Chronic systolic Heart failure, Stage C, NYHA 2  Etiology: NICM- obesity, HTN     Weight: 378 lbs--> 401 lbs  NT proBNP: 3/7/20: 348     Studies- personally reviewed by me  Echo 1/11/23:  LVEF: 60%  RV: mildly dilated, normal function     Echo 6/13/19:  LVEF: 50%, grade 2 DD  LVIDd: 6.2 cm  RV: normal  MR: mild     Echocardiogram 11/16/17  LVEF: 35%  LVIDd: 6.5  RV: dilated     Echo June 2017  LVEF: 35%     Neurohormonal Blockade:  --Beta-Blocker: coreg 12.5 mg BID  --ACEi, ARB or ARNi: hydralazine 25 mg TID, Imdur 30 mg daily    (or SVR reduction)  --Aldosterone Receptor Blocker: gynecomastia from spironolactone and inspra so off of them now  --Diuretic: torsemide 80 mg daily, K 20 meq daily     Sudden Cardiac Death Risk Reduction:  --ICD: EF now around 50%     Electrical Resynchronization:  --narrow QRS     Advanced Therapies (If appropriate):   --Inotrope:  --LVAD/Transplant Candidacy:     # Morbid obesity- BMI 68, recommend gastric bypass eval  # hyperlipidemia - atorvastatin 20 mg   3/4/23: LDL 48, HDL 36, Tri 165  7/2/22: LDL 59, HDL 33, Tri 165- post statin  12/4/21: Tri 177, HDL 35,  - pre statin  9/25: , HDL 32  # HTN  # KYRIE- BiPAP  # CKD, Cr 1.1 on 3/4/23     TODAY'S PLAN:  Continue lasix 80 mg daily for HF  LDL much better from 142 to 48 with statin  Continue BiPap for KYRIE  Unfortunately limited mobility due to right knee pain- arthritis  --2g sodium diet  - Daily weights     HPI:   61 yo male presents for follow up of systolic heart failure. He follows with Dr. Fanny Yanes for BiV heart failure, obesity and KYRIE. He states he did not have medical insurance for 2.5 years and then got Medicare, went to see his family doctor who sent him to ER due to his volume overload. He had not been on medicines. He is down 24 lbs since his admission. He reports he had a cardiac cath back around 2010 which was negative. He was discharged from the hospital on coreg 6.25 mg BID, hydralazine and imdur. His last Cr was 1.39. He was discharged on lasix 40 mg BID. HE is low sodium in his diet. He had lost 12 more lbs since his discharge. He is getting cramping. On my last follow up his weight was up more as drinking too much so I educated about amount to drink. Also discussed gastric bypass as BMI was 67. Also continued to recommend ICD for primary prevention. Interval History:  Happy birthday  Wt up 24 lbs  Trying medical marijuana    Review of Systems   Constitutional:  Negative for activity change, appetite change, fatigue and unexpected weight change (wt up 24 lbs). HENT:  Negative for congestion and nosebleeds. Eyes: Negative. Respiratory:  Negative for cough, chest tightness and shortness of breath. Cardiovascular:  Negative for chest pain, palpitations and leg swelling. Gastrointestinal:  Negative for abdominal distention. Endocrine: Negative. Genitourinary: Negative. Musculoskeletal: Negative. Skin: Negative. Neurological:  Negative for dizziness, syncope and weakness. Hematological: Negative. Psychiatric/Behavioral: Negative. Past Medical History:   Diagnosis Date    Arthritis     CHF (congestive heart failure) (HCC)     Hx of cardiac cath     Hypertension     KYRIE treated with BiPAP          Allergies   Allergen Reactions    Penicillins      .     Current Outpatient Medications:     aspirin 81 mg chewable tablet, Chew 1 tablet daily, Disp: , Rfl: 0    atorvastatin (LIPITOR) 20 mg tablet, TAKE 1 TABLET BY MOUTH EVERY DAY, Disp: 90 tablet, Rfl: 1    carvedilol (COREG) 12.5 mg tablet, TAKE 1 TABLET (12.5 MG TOTAL) BY MOUTH 2 (TWO) TIMES A DAY, Disp: 180 tablet, Rfl: 3    diclofenac (VOLTAREN) 75 mg EC tablet, prn, Disp: , Rfl: 1    hydrALAZINE (APRESOLINE) 25 mg tablet, TAKE 1 TABLET BY MOUTH THREE TIMES A DAY, Disp: 270 tablet, Rfl: 3    isosorbide mononitrate (IMDUR) 30 mg 24 hr tablet, Take 1 tablet (30 mg total) by mouth daily, Disp: 90 tablet, Rfl: 0    Klor-Con M20 20 MEQ tablet, TAKE 2 TABLETS BY MOUTH EVERY DAY, Disp: 180 tablet, Rfl: 3    Magnesium 500 MG TABS, Take 1 capsule by mouth daily , Disp: , Rfl:     torsemide (DEMADEX) 20 mg tablet, TAKE 2 TABLETS (40 MG TOTAL) BY MOUTH 2 (TWO) TIMES A DAY, Disp: 360 tablet, Rfl: 3    clotrimazole-betamethasone (LOTRISONE) 1-0.05 % cream, Apply topically 2 (two) times a day, Disp: , Rfl:     naproxen (NAPROSYN) 500 mg tablet, Take 1 tablet (500 mg total) by mouth 2 (two) times a day with meals for 7 days (Patient taking differently: Take 500 mg by mouth as needed), Disp: 14 tablet, Rfl: 0    tamsulosin (FLOMAX) 0.4 mg, TAKE 1 CAPSULE BY MOUTH EVERY DAY AT NIGHT (Patient not taking: Reported on 10/13/2022), Disp: , Rfl:     Social History     Socioeconomic History    Marital status: /Civil Union     Spouse name: Not on file    Number of children: Not on file    Years of education: Not on file    Highest education level: Not on file   Occupational History    Not on file   Tobacco Use    Smoking status: Never    Smokeless tobacco: Never   Vaping Use    Vaping Use: Never used   Substance and Sexual Activity    Alcohol use: No    Drug use: No    Sexual activity: Not Currently   Other Topics Concern    Not on file   Social History Narrative    Not on file     Social Determinants of Health     Financial Resource Strain: Not on file   Food Insecurity: Not on file Transportation Needs: Not on file   Physical Activity: Not on file   Stress: Not on file   Social Connections: Not on file   Intimate Partner Violence: Not on file   Housing Stability: Not on file       No family history on file. Physical Exam:    Vitals:     Physical Exam    Labs & Results:    Lab Results   Component Value Date    SODIUM 138 03/04/2023    K 4.0 03/04/2023     03/04/2023    CO2 29 03/04/2023    BUN 26 (H) 03/04/2023    CREATININE 1.10 03/04/2023    GLUC 108 08/08/2017    CALCIUM 9.3 03/04/2023     Lab Results   Component Value Date    WBC 11.41 (H) 12/04/2021    HGB 14.6 12/04/2021    HCT 42.0 12/04/2021    MCV 95 12/04/2021     12/04/2021     Lab Results   Component Value Date    NTBNP 348 (H) 03/07/2020      Lab Results   Component Value Date    CHOLESTEROL 108 03/04/2023    CHOLESTEROL 125 07/02/2022    CHOLESTEROL 212 (H) 12/04/2021     Lab Results   Component Value Date    HDL 36 (L) 03/04/2023    HDL 33 (L) 07/02/2022    HDL 35 (L) 12/04/2021     Lab Results   Component Value Date    TRIG 120 03/04/2023    TRIG 165 (H) 07/02/2022    TRIG 177 (H) 12/04/2021     Lab Results   Component Value Date    NONHDLC 72 03/04/2023    [address] 92 07/02/2022    [address] 177 12/04/2021       EKG personally reviewed by Saintclair Lund. Counseling / Coordination of Care  Time spent today 25 minutes. Greater than 50% of total time was spent with the patient and / or family counseling and / or coordination of care. We went over current diagnosis, most recent studies and any changes in treatment. Thank you for the opportunity to participate in the care of this patient.     Jordyn Broderick [facility]  MEDICAL DIRECTOR OF [address] [place] [address]

## 2023-11-20 ENCOUNTER — OFFICE VISIT (OUTPATIENT)
Dept: CARDIOLOGY CLINIC | Facility: CLINIC | Age: 58
End: 2023-11-20
Payer: MEDICARE

## 2023-11-20 VITALS
DIASTOLIC BLOOD PRESSURE: 70 MMHG | HEIGHT: 64 IN | BODY MASS INDEX: 53.78 KG/M2 | SYSTOLIC BLOOD PRESSURE: 138 MMHG | WEIGHT: 315 LBS | HEART RATE: 75 BPM | OXYGEN SATURATION: 95 %

## 2023-11-20 DIAGNOSIS — G47.33 OSA (OBSTRUCTIVE SLEEP APNEA): ICD-10-CM

## 2023-11-20 DIAGNOSIS — I10 ESSENTIAL HYPERTENSION: Primary | Chronic | ICD-10-CM

## 2023-11-20 DIAGNOSIS — E78.00 PURE HYPERCHOLESTEROLEMIA: ICD-10-CM

## 2023-11-20 DIAGNOSIS — E66.01 MORBID OBESITY DUE TO EXCESS CALORIES (HCC): Chronic | ICD-10-CM

## 2023-11-20 DIAGNOSIS — I47.29 NON-SUSTAINED VENTRICULAR TACHYCARDIA (HCC): ICD-10-CM

## 2023-11-20 PROCEDURE — 99214 OFFICE O/P EST MOD 30 MIN: CPT | Performed by: INTERNAL MEDICINE

## 2024-02-05 DIAGNOSIS — I42.8 NICM (NONISCHEMIC CARDIOMYOPATHY) (HCC): ICD-10-CM

## 2024-02-05 DIAGNOSIS — I50.22 CHRONIC SYSTOLIC CHF (CONGESTIVE HEART FAILURE), NYHA CLASS 2 (HCC): ICD-10-CM

## 2024-02-05 RX ORDER — TORSEMIDE 20 MG/1
40 TABLET ORAL 2 TIMES DAILY
Qty: 360 TABLET | Refills: 0 | Status: SHIPPED | OUTPATIENT
Start: 2024-02-05

## 2024-02-21 PROBLEM — N39.0 URINARY TRACT INFECTION: Status: RESOLVED | Noted: 2017-06-29 | Resolved: 2024-02-21

## 2024-02-21 PROBLEM — A41.9 SEPSIS (HCC): Status: RESOLVED | Noted: 2017-06-29 | Resolved: 2024-02-21

## 2024-02-24 ENCOUNTER — APPOINTMENT (OUTPATIENT)
Dept: LAB | Facility: CLINIC | Age: 59
End: 2024-02-24
Payer: MEDICARE

## 2024-02-24 LAB
ANION GAP SERPL CALCULATED.3IONS-SCNC: 4 MMOL/L
BUN SERPL-MCNC: 21 MG/DL (ref 5–25)
CALCIUM SERPL-MCNC: 9.2 MG/DL (ref 8.4–10.2)
CHLORIDE SERPL-SCNC: 105 MMOL/L (ref 96–108)
CHOLEST SERPL-MCNC: 128 MG/DL
CO2 SERPL-SCNC: 30 MMOL/L (ref 21–32)
CREAT SERPL-MCNC: 0.96 MG/DL (ref 0.6–1.3)
ERYTHROCYTE [DISTWIDTH] IN BLOOD BY AUTOMATED COUNT: 14 % (ref 11.6–15.1)
GFR SERPL CREATININE-BSD FRML MDRD: 86 ML/MIN/1.73SQ M
GLUCOSE P FAST SERPL-MCNC: 116 MG/DL (ref 65–99)
HCT VFR BLD AUTO: 45.5 % (ref 36.5–49.3)
HDLC SERPL-MCNC: 39 MG/DL
HGB BLD-MCNC: 14.6 G/DL (ref 12–17)
LDLC SERPL CALC-MCNC: 60 MG/DL (ref 0–100)
MCH RBC QN AUTO: 29.3 PG (ref 26.8–34.3)
MCHC RBC AUTO-ENTMCNC: 32.1 G/DL (ref 31.4–37.4)
MCV RBC AUTO: 91 FL (ref 82–98)
NONHDLC SERPL-MCNC: 89 MG/DL
PLATELET # BLD AUTO: 228 THOUSANDS/UL (ref 149–390)
PMV BLD AUTO: 10.6 FL (ref 8.9–12.7)
POTASSIUM SERPL-SCNC: 3.9 MMOL/L (ref 3.5–5.3)
RBC # BLD AUTO: 4.98 MILLION/UL (ref 3.88–5.62)
SODIUM SERPL-SCNC: 139 MMOL/L (ref 135–147)
TRIGL SERPL-MCNC: 146 MG/DL
WBC # BLD AUTO: 9.93 THOUSAND/UL (ref 4.31–10.16)

## 2024-02-25 DIAGNOSIS — I50.42 CHRONIC COMBINED SYSTOLIC AND DIASTOLIC CHF, NYHA CLASS 2 (HCC): ICD-10-CM

## 2024-02-25 DIAGNOSIS — I50.22 CHRONIC SYSTOLIC CHF (CONGESTIVE HEART FAILURE), NYHA CLASS 2 (HCC): ICD-10-CM

## 2024-02-25 DIAGNOSIS — I42.8 NICM (NONISCHEMIC CARDIOMYOPATHY) (HCC): ICD-10-CM

## 2024-02-26 RX ORDER — POTASSIUM CHLORIDE 20 MEQ/1
40 TABLET, EXTENDED RELEASE ORAL DAILY
Qty: 180 TABLET | Refills: 3 | Status: SHIPPED | OUTPATIENT
Start: 2024-02-26

## 2024-03-16 ENCOUNTER — HOSPITAL ENCOUNTER (EMERGENCY)
Facility: HOSPITAL | Age: 59
Discharge: HOME | End: 2024-03-16
Attending: EMERGENCY MEDICINE
Payer: MEDICARE

## 2024-03-16 ENCOUNTER — APPOINTMENT (EMERGENCY)
Dept: ULTRASOUND IMAGING | Facility: HOSPITAL | Age: 59
End: 2024-03-16
Payer: MEDICARE

## 2024-03-16 ENCOUNTER — APPOINTMENT (EMERGENCY)
Dept: CT IMAGING | Facility: HOSPITAL | Age: 59
End: 2024-03-16
Payer: MEDICARE

## 2024-03-16 VITALS
OXYGEN SATURATION: 98 % | HEART RATE: 90 BPM | RESPIRATION RATE: 18 BRPM | BODY MASS INDEX: 69.17 KG/M2 | SYSTOLIC BLOOD PRESSURE: 193 MMHG | WEIGHT: 315 LBS | DIASTOLIC BLOOD PRESSURE: 89 MMHG | TEMPERATURE: 97.7 F

## 2024-03-16 DIAGNOSIS — K86.9 DISORDER OF PANCREAS: ICD-10-CM

## 2024-03-16 DIAGNOSIS — K76.0 HEPATIC STEATOSIS: ICD-10-CM

## 2024-03-16 DIAGNOSIS — K80.20 CHOLELITHIASES: ICD-10-CM

## 2024-03-16 DIAGNOSIS — E66.9 OBESITY: ICD-10-CM

## 2024-03-16 DIAGNOSIS — N39.0 UTI (URINARY TRACT INFECTION): Primary | ICD-10-CM

## 2024-03-16 DIAGNOSIS — I10 HTN (HYPERTENSION): ICD-10-CM

## 2024-03-16 DIAGNOSIS — R16.0 HEPATOMEGALY: ICD-10-CM

## 2024-03-16 LAB
ALBUMIN SERPL BCP-MCNC: 4.1 G/DL (ref 3.5–5)
ALP SERPL-CCNC: 77 U/L (ref 34–104)
ALT SERPL W P-5'-P-CCNC: 42 U/L (ref 7–52)
ANION GAP SERPL CALCULATED.3IONS-SCNC: 8 MMOL/L (ref 4–13)
AST SERPL W P-5'-P-CCNC: 27 U/L (ref 13–39)
BACTERIA UR QL AUTO: ABNORMAL /HPF
BASOPHILS # BLD AUTO: 0.12 THOUSANDS/ÂΜL (ref 0–0.1)
BASOPHILS NFR BLD AUTO: 1 % (ref 0–1)
BILIRUB SERPL-MCNC: 0.59 MG/DL (ref 0.2–1)
BILIRUB UR QL STRIP: NEGATIVE
BUN SERPL-MCNC: 21 MG/DL (ref 5–25)
CALCIUM SERPL-MCNC: 9.5 MG/DL (ref 8.4–10.2)
CHLORIDE SERPL-SCNC: 105 MMOL/L (ref 96–108)
CLARITY UR: ABNORMAL
CO2 SERPL-SCNC: 27 MMOL/L (ref 21–32)
COLOR UR: ABNORMAL
CREAT SERPL-MCNC: 1.08 MG/DL (ref 0.6–1.3)
EOSINOPHIL # BLD AUTO: 0.36 THOUSAND/ÂΜL (ref 0–0.61)
EOSINOPHIL NFR BLD AUTO: 3 % (ref 0–6)
ERYTHROCYTE [DISTWIDTH] IN BLOOD BY AUTOMATED COUNT: 13.5 % (ref 11.6–15.1)
GFR SERPL CREATININE-BSD FRML MDRD: 75 ML/MIN/1.73SQ M
GLUCOSE SERPL-MCNC: 117 MG/DL (ref 65–140)
GLUCOSE UR STRIP-MCNC: NEGATIVE MG/DL
HCT VFR BLD AUTO: 47.5 % (ref 36.5–49.3)
HGB BLD-MCNC: 15.3 G/DL (ref 12–17)
HGB UR QL STRIP.AUTO: ABNORMAL
IMM GRANULOCYTES # BLD AUTO: 0.14 THOUSAND/UL (ref 0–0.2)
IMM GRANULOCYTES NFR BLD AUTO: 1 % (ref 0–2)
KETONES UR STRIP-MCNC: NEGATIVE MG/DL
LEUKOCYTE ESTERASE UR QL STRIP: ABNORMAL
LIPASE SERPL-CCNC: 29 U/L (ref 11–82)
LYMPHOCYTES # BLD AUTO: 1.45 THOUSANDS/ÂΜL (ref 0.6–4.47)
LYMPHOCYTES NFR BLD AUTO: 13 % (ref 14–44)
MCH RBC QN AUTO: 29.3 PG (ref 26.8–34.3)
MCHC RBC AUTO-ENTMCNC: 32.2 G/DL (ref 31.4–37.4)
MCV RBC AUTO: 91 FL (ref 82–98)
MONOCYTES # BLD AUTO: 0.86 THOUSAND/ÂΜL (ref 0.17–1.22)
MONOCYTES NFR BLD AUTO: 8 % (ref 4–12)
NEUTROPHILS # BLD AUTO: 8.37 THOUSANDS/ÂΜL (ref 1.85–7.62)
NEUTS SEG NFR BLD AUTO: 74 % (ref 43–75)
NITRITE UR QL STRIP: NEGATIVE
NON-SQ EPI CELLS URNS QL MICRO: ABNORMAL /HPF
NRBC BLD AUTO-RTO: 0 /100 WBCS
PH UR STRIP.AUTO: 6.5 [PH]
PLATELET # BLD AUTO: 242 THOUSANDS/UL (ref 149–390)
PMV BLD AUTO: 10.5 FL (ref 8.9–12.7)
POTASSIUM SERPL-SCNC: 3.7 MMOL/L (ref 3.5–5.3)
PROT SERPL-MCNC: 7.9 G/DL (ref 6.4–8.4)
PROT UR STRIP-MCNC: ABNORMAL MG/DL
RBC # BLD AUTO: 5.22 MILLION/UL (ref 3.88–5.62)
RBC #/AREA URNS AUTO: ABNORMAL /HPF
SODIUM SERPL-SCNC: 140 MMOL/L (ref 135–147)
SP GR UR STRIP.AUTO: 1.01 (ref 1–1.03)
UROBILINOGEN UR STRIP-ACNC: <2 MG/DL
WBC # BLD AUTO: 11.3 THOUSAND/UL (ref 4.31–10.16)
WBC #/AREA URNS AUTO: ABNORMAL /HPF
WBC CLUMPS # UR AUTO: PRESENT /UL

## 2024-03-16 PROCEDURE — 99285 EMERGENCY DEPT VISIT HI MDM: CPT | Performed by: EMERGENCY MEDICINE

## 2024-03-16 PROCEDURE — 87186 SC STD MICRODIL/AGAR DIL: CPT

## 2024-03-16 PROCEDURE — 80053 COMPREHEN METABOLIC PANEL: CPT

## 2024-03-16 PROCEDURE — 87086 URINE CULTURE/COLONY COUNT: CPT

## 2024-03-16 PROCEDURE — 74177 CT ABD & PELVIS W/CONTRAST: CPT

## 2024-03-16 PROCEDURE — 81001 URINALYSIS AUTO W/SCOPE: CPT

## 2024-03-16 PROCEDURE — 99284 EMERGENCY DEPT VISIT MOD MDM: CPT

## 2024-03-16 PROCEDURE — 85025 COMPLETE CBC W/AUTO DIFF WBC: CPT

## 2024-03-16 PROCEDURE — 87077 CULTURE AEROBIC IDENTIFY: CPT

## 2024-03-16 PROCEDURE — 96365 THER/PROPH/DIAG IV INF INIT: CPT

## 2024-03-16 PROCEDURE — 83690 ASSAY OF LIPASE: CPT

## 2024-03-16 PROCEDURE — 36415 COLL VENOUS BLD VENIPUNCTURE: CPT

## 2024-03-16 RX ORDER — CEFPODOXIME PROXETIL 200 MG/1
200 TABLET, FILM COATED ORAL 2 TIMES DAILY
Qty: 20 TABLET | Refills: 0 | Status: SHIPPED | OUTPATIENT
Start: 2024-03-16 | End: 2024-03-26

## 2024-03-16 RX ADMIN — DEXTROSE 1000 MG: 50 INJECTION, SOLUTION INTRAVENOUS at 11:55

## 2024-03-16 RX ADMIN — IOHEXOL 100 ML: 350 INJECTION, SOLUTION INTRAVENOUS at 12:16

## 2024-03-16 NOTE — ED PROVIDER NOTES
History  Chief Complaint   Patient presents with    Painful Urination     Urinating this morning and reports it felt painful. Also reports urinary frequency.      58-year-old male with history of CHF, HTN, obesity presents with dysuria.  Patient states prior to arrival he went to go stand up to urinate when he had sharp pain radiating through his urethra to his bladder.  Patient went to use restroom and had dysuria at time.  After incident pain had recited but now continues to have frequency, urgency, dysuria.  Urine is dark and discolored per patient which is abnormal for him.  Noted foul smell to urine.  Has had periodic left CVA tenderness.  Denies recent medication changes.  Denies recent trauma.  Denies abdominal pains.  History as above.  Denies fevers, chills, chest pain, shortness of breath, abdominal pain, changes in bowel movements, current back pain, inability to ambulate.          Prior to Admission Medications   Prescriptions Last Dose Informant Patient Reported? Taking?   Magnesium 500 MG TABS  Self Yes No   Sig: Take 1 capsule by mouth daily    aspirin 81 mg chewable tablet  Self No No   Sig: Chew 1 tablet daily   atorvastatin (LIPITOR) 20 mg tablet  Self No No   Sig: TAKE 1 TABLET BY MOUTH EVERY DAY   carvedilol (COREG) 12.5 mg tablet  Self No No   Sig: TAKE 1 TABLET (12.5 MG TOTAL) BY MOUTH 2 (TWO) TIMES A DAY   clotrimazole-betamethasone (LOTRISONE) 1-0.05 % cream  Self Yes No   Sig: Apply topically 2 (two) times a day   diclofenac (VOLTAREN) 75 mg EC tablet  Self Yes No   Sig: prn   hydrALAZINE (APRESOLINE) 25 mg tablet  Self No No   Sig: TAKE 1 TABLET BY MOUTH THREE TIMES A DAY   isosorbide mononitrate (IMDUR) 30 mg 24 hr tablet  Self No No   Sig: Take 1 tablet (30 mg total) by mouth daily   naproxen (NAPROSYN) 500 mg tablet  Self No No   Sig: Take 1 tablet (500 mg total) by mouth 2 (two) times a day with meals for 7 days   Patient taking differently: Take 500 mg by mouth as needed   potassium  chloride (Klor-Con M20) 20 mEq tablet   No No   Sig: Take 2 tablets (40 mEq total) by mouth daily   tamsulosin (FLOMAX) 0.4 mg  Self Yes No   Sig: TAKE 1 CAPSULE BY MOUTH EVERY DAY AT NIGHT   Patient not taking: Reported on 10/13/2022   torsemide (DEMADEX) 20 mg tablet   No No   Sig: Take 2 tablets (40 mg total) by mouth 2 (two) times a day      Facility-Administered Medications: None       Past Medical History:   Diagnosis Date    Arthritis     CHF (congestive heart failure) (HCC)     Hx of cardiac cath     Hypertension     KYRIE treated with BiPAP        Past Surgical History:   Procedure Laterality Date    ABDOMINAL SURGERY      stomach cauterization post emesis    KNEE ARTHROSCOPY Right        History reviewed. No pertinent family history.  I have reviewed and agree with the history as documented.    E-Cigarette/Vaping    E-Cigarette Use Never User      E-Cigarette/Vaping Substances    Nicotine No     THC No     CBD No     Flavoring No     Other No     Unknown No      Social History     Tobacco Use    Smoking status: Never    Smokeless tobacco: Never   Vaping Use    Vaping status: Never Used   Substance Use Topics    Alcohol use: No    Drug use: No        Review of Systems   All other systems reviewed and are negative.      Physical Exam  ED Triage Vitals   Temperature Pulse Respirations Blood Pressure SpO2   03/16/24 1004 03/16/24 1004 03/16/24 1004 03/16/24 1008 03/16/24 1004   97.7 °F (36.5 °C) 90 18 (!) 193/89 98 %      Temp Source Heart Rate Source Patient Position - Orthostatic VS BP Location FiO2 (%)   03/16/24 1004 03/16/24 1004 -- 03/16/24 1004 --   Oral Monitor  Right arm       Pain Score       --                    Orthostatic Vital Signs  Vitals:    03/16/24 1004 03/16/24 1008   BP:  (!) 193/89   Pulse: 90        Physical Exam  Vitals reviewed.   Constitutional:       General: He is not in acute distress.     Appearance: Normal appearance. He is obese. He is not ill-appearing, toxic-appearing or  diaphoretic.   HENT:      Head: Normocephalic and atraumatic.      Right Ear: External ear normal.      Left Ear: External ear normal.      Nose: Nose normal.      Mouth/Throat:      Mouth: Mucous membranes are moist.   Eyes:      Extraocular Movements: Extraocular movements intact.   Cardiovascular:      Rate and Rhythm: Normal rate and regular rhythm.      Pulses: Normal pulses.      Heart sounds: Normal heart sounds.   Pulmonary:      Effort: Pulmonary effort is normal.      Breath sounds: Normal breath sounds.   Abdominal:      General: There is no distension.      Palpations: Abdomen is soft. There is no mass.      Tenderness: There is no abdominal tenderness. There is no right CVA tenderness, left CVA tenderness or guarding.   Genitourinary:     Testes: Normal.      Comments: No rash of the groin.  No discharge from urethra.  Musculoskeletal:         General: No swelling, tenderness, deformity or signs of injury. Normal range of motion.      Cervical back: Normal range of motion and neck supple. No tenderness.      Right lower leg: No edema.      Left lower leg: No edema.   Skin:     General: Skin is warm and dry.      Capillary Refill: Capillary refill takes less than 2 seconds.      Coloration: Skin is not jaundiced or pale.      Findings: No bruising, erythema, lesion or rash.   Neurological:      Mental Status: He is alert and oriented to person, place, and time. Mental status is at baseline.   Psychiatric:         Mood and Affect: Mood normal.         Behavior: Behavior normal.         ED Medications  Medications   ceftriaxone (ROCEPHIN) 1 g/50 mL in dextrose IVPB (0 mg Intravenous Stopped 3/16/24 1245)   iohexol (OMNIPAQUE) 350 MG/ML injection (MULTI-DOSE) 100 mL (100 mL Intravenous Given 3/16/24 1216)       Diagnostic Studies  Results Reviewed       Procedure Component Value Units Date/Time    Urine Microscopic [[id number]]  (Abnormal) Collected: 03/16/24 1039    Lab Status: Final result Specimen: Urine,  Clean Catch Updated: 03/16/24 1138     RBC, UA Innumerable /hpf      WBC, UA Innumerable /hpf      Epithelial Cells None Seen /hpf      Bacteria, UA Occasional /hpf      WBC Clumps Present    Urine culture [[id number]] Collected: 03/16/24 1039    Lab Status: In process Specimen: Urine, Clean Catch Updated: 03/16/24 1138    Lipase [[id number]]  (Normal) Collected: 03/16/24 1039    Lab Status: Final result Specimen: Blood from Arm, Right Updated: 03/16/24 1104     Lipase 29 u/L     Comprehensive metabolic panel [[id number]] Collected: 03/16/24 1039    Lab Status: Final result Specimen: Blood from Arm, Right Updated: 03/16/24 1104     Sodium 140 mmol/L      Potassium 3.7 mmol/L      Chloride 105 mmol/L      CO2 27 mmol/L      ANION GAP 8 mmol/L      BUN 21 mg/dL      Creatinine 1.08 mg/dL      Glucose 117 mg/dL      Calcium 9.5 mg/dL      AST 27 U/L      ALT 42 U/L      Alkaline Phosphatase 77 U/L      Total Protein 7.9 g/dL      Albumin 4.1 g/dL      Total Bilirubin 0.59 mg/dL      eGFR 75 ml/min/1.73sq m     Narrative:      National Kidney Disease Foundation guidelines for Chronic Kidney Disease (CKD):     Stage 1 with normal or high GFR (GFR > 90 mL/min/1.73 square meters)    Stage 2 Mild CKD (GFR = 60-89 mL/min/1.73 square meters)    Stage 3A Moderate CKD (GFR = 45-59 mL/min/1.73 square meters)    Stage 3B Moderate CKD (GFR = 30-44 mL/min/1.73 square meters)    Stage 4 Severe CKD (GFR = 15-29 mL/min/1.73 square meters)    Stage 5 End Stage CKD (GFR <15 mL/min/1.73 square meters)  Note: GFR calculation is accurate only with a steady state creatinine    UA w Reflex to Microscopic w Reflex to Culture [[id number]]  (Abnormal) Collected: 03/16/24 1039    Lab Status: Final result Specimen: Urine, Clean Catch Updated: 03/16/24 1100     Color, UA Light Orange     Clarity, UA Turbid     Specific Gravity, UA 1.014     pH, UA 6.5     Leukocytes, UA Large     Nitrite, UA Negative     Protein,  (3+) mg/dl      Glucose, UA  Negative mg/dl      Ketones, UA Negative mg/dl      Urobilinogen, UA <2.0 mg/dl      Bilirubin, UA Negative     Occult Blood, UA Large    CBC and differential [[id number]]  (Abnormal) Collected: 03/16/24 1039    Lab Status: Final result Specimen: Blood from Arm, Right Updated: 03/16/24 1046     WBC 11.30 Thousand/uL      RBC 5.22 Million/uL      Hemoglobin 15.3 g/dL      Hematocrit 47.5 %      MCV 91 fL      MCH 29.3 pg      MCHC 32.2 g/dL      RDW 13.5 %      MPV 10.5 fL      Platelets 242 Thousands/uL      nRBC 0 /100 WBCs      Neutrophils Relative 74 %      Immature Grans % 1 %      Lymphocytes Relative 13 %      Monocytes Relative 8 %      Eosinophils Relative 3 %      Basophils Relative 1 %      Neutrophils Absolute 8.37 Thousands/µL      Absolute Immature Grans 0.14 Thousand/uL      Absolute Lymphocytes 1.45 Thousands/µL      Absolute Monocytes 0.86 Thousand/µL      Eosinophils Absolute 0.36 Thousand/µL      Basophils Absolute 0.12 Thousands/µL                    CT abdomen pelvis with contrast   Final Result by Miley Kitchen MD (03/16 1331)         1. Hepatomegaly and hepatic steatosis.   Cholelithiasis.      2. 14 mm low-density area in the pancreatic tail. Recommend evaluation with outpatient MRI abdomen with MRCP.   3. No hydronephrosis or nephrolithiasis.   The study was marked in EPIC for immediate notification and follow-up evaluation.         Workstation performed: [id number]               Procedures  Procedures      ED Course  ED Course as of 03/16/24 1442   Sat Mar 16, 2024   1105 Leukocytes, UA(!): Large                             SBIRT 22yo+      Flowsheet Row Most Recent Value   Initial Alcohol Screen: US AUDIT-C     1. How often do you have a drink containing alcohol? 0 Filed at: 03/16/2024 1237   2. How many drinks containing alcohol do you have on a typical day you are drinking?  0 Filed at: 03/16/2024 1237   3a. Male UNDER 65: How often do you have five or more drinks on one occasion? 0  Filed at: 03/16/2024 1237   Audit-C Score 0 Filed at: 03/16/2024 1237   SUNNY: How many times in the past year have you...    Used an illegal drug or used a prescription medication for non-medical reasons? Never Filed at: 03/16/2024 1237                  Medical Decision Making  58-year-old male presents with dysuria, urgency, frequency.  Differential includes but not limited to cystitis, pyelonephritis, kidney stones, bladder spasm, interstitial nephritis  Abdomen soft nontender, doubtful acute abdomen.    Urinalysis notable for cystitis.  Started on ceftriaxone.  Imaging notable for findings addressed in problem list.    Patient otherwise well-appearing, ambulatory, no acute distress, abdomen soft nontender, continues to make urine.  Discussed findings with patient.    Advised to follow-up with primary care physician, general surgery, gastroenterology.  Follow-up information given.  Referrals to follow-ups placed.  Patient discharged home to self-care strict return precautions.  Patient understanding and agreement with plan.        Amount and/or Complexity of Data Reviewed  Labs: ordered. Decision-making details documented in ED Course.  Radiology: ordered.    Risk  Prescription drug management.          Disposition  Final diagnoses:   UTI (urinary tract infection)   Hepatomegaly   Hepatic steatosis   Cholelithiases   Disorder of pancreas   HTN (hypertension)   Obesity     Time reflects when diagnosis was documented in both MDM as applicable and the Disposition within this note       Time User Action Codes Description Comment    3/16/2024  1:56 PM Bard, Aerin J Add [N39.0] UTI (urinary tract infection)     3/16/2024  1:56 PM Bard, Aerin J Add [R16.0] Hepatomegaly     3/16/2024  1:57 PM Bard, Aerin J Add [K76.0] Hepatic steatosis     3/16/2024  1:57 PM Bard, Aerin J Add [K80.20] Cholelithiases     3/16/2024  1:58 PM Bard, Aerin J Add [K86.9] Disorder of pancreas     3/16/2024  2:27 PM Bard, Aerin J Add [I10] HTN  (hypertension)     3/16/2024  2:27 PM Thang Price Add [E66.9] Obesity           ED Disposition       ED Disposition   Discharge    Condition   Stable    Date/Time   Sat Mar 16, 2024 1401    Comment   Dante TAYLOR Sagar discharge to home/self care.                   Follow-up Information       Follow up With Specialties Details Why Contact Info Additional Information    Valentin Calvert DO Family Medicine Schedule an appointment as soon as possible for a visit in 1 week  [address] [facility] [place] [id number] [phone]       [facility] Emergency Department Emergency Medicine Go to  If symptoms worsen [address] [facility] [address]  [phone] [facility] Emergency Department, [address] [facility] [facility], [place], [place], [address]    [facility] Urology Pod Urology Call   [address] [facility] [phone]     [facility] General Surgery Pod General Surgery Call   [address] [facility] [phone]     [facility] [place] Gastroenterology Call   [address]  [facility] [address] [phone] [facility] [place], [address], [place], [place], [address] [phone]            Discharge Medication List as of 3/16/2024  2:01 PM        START taking these medications    Details   cefpodoxime (VANTIN) 200 mg tablet Take 1 tablet (200 mg total) by mouth 2 (two) times a day for 10 days, Starting Sat 3/16/2024, Until Tue 3/26/2024, Normal           CONTINUE these medications which have NOT CHANGED    Details   aspirin 81 mg chewable tablet Chew 1 tablet daily, Starting Mon 7/3/2017, Print      atorvastatin (LIPITOR) 20 mg tablet TAKE 1 TABLET BY MOUTH EVERY DAY, Normal      carvedilol (COREG) 12.5 mg tablet TAKE 1 TABLET (12.5 MG TOTAL) BY MOUTH 2 (TWO) TIMES A DAY, Starting Tue 6/21/2022, Normal       clotrimazole-betamethasone (LOTRISONE) 1-0.05 % cream Apply topically 2 (two) times a day, Starting Thu 6/25/2020, Until Thu 10/13/2022, Historical Med      diclofenac (VOLTAREN) 75 mg EC tablet prn, Historical Med      hydrALAZINE (APRESOLINE) 25 mg tablet TAKE 1 TABLET BY MOUTH THREE TIMES A DAY, Normal      isosorbide mononitrate (IMDUR) 30 mg 24 hr tablet Take 1 tablet (30 mg total) by mouth daily, Starting Thu 8/18/2022, Normal      Magnesium 500 MG TABS Take 1 capsule by mouth daily , Historical Med      naproxen (NAPROSYN) 500 mg tablet Take 1 tablet (500 mg total) by mouth 2 (two) times a day with meals for 7 days, Starting Wed 9/22/2021, Until Thu 10/13/2022, Print      potassium chloride (Klor-Con M20) 20 mEq tablet Take 2 tablets (40 mEq total) by mouth daily, Starting Mon 2/26/2024, Normal      tamsulosin (FLOMAX) 0.4 mg TAKE 1 CAPSULE BY MOUTH EVERY DAY AT NIGHT, Historical Med      torsemide (DEMADEX) 20 mg tablet Take 2 tablets (40 mg total) by mouth 2 (two) times a day, Starting Mon 2/5/2024, Normal               PDMP Review       None             ED Provider  Attending physically available and evaluated Dante Keith. I managed the patient along with the ED Attending.    Electronically Signed by           Thang Price MD  03/16/24 2358

## 2024-03-16 NOTE — ED ATTENDING ATTESTATION
3/16/2024  ILisbeth MD, saw and evaluated the patient. I have discussed the patient with the resident/non-physician practitioner and agree with the resident's/non-physician practitioner's findings, Plan of Care, and MDM as documented in the resident's/non-physician practitioner's note, except where noted. All available labs and Radiology studies were reviewed.  I was present for key portions of any procedure(s) performed by the resident/non-physician practitioner and I was immediately available to provide assistance.       At this point I agree with the current assessment done in the Emergency Department.  I have conducted an independent evaluation of this patient a history and physical is as follows:    58-year-old male presenting to emergency department with dysuria and hematuria.  Increased frequency of urination.  No discharge.  No fevers or chills.  No chest pain.  Distress.  No abdominal pain.  No nausea vomiting.    Agree with checking urine for infection, kidney function, CBC, CT eval for stone      ED Course         Critical Care Time  Procedures

## 2024-03-16 NOTE — Clinical Note
Dante Keith was seen and treated in our emergency department on 3/16/2024.                Diagnosis:     Dante  .    He may return on this date: 03/18/2024         If you have any questions or concerns, please don't hesitate to call.      Thang Price MD    ______________________________           _______________          _______________  Hospital Representative                              Date                                Time

## 2024-03-16 NOTE — DISCHARGE INSTRUCTIONS
CT abdomen pelvis with contrast: 1. Hepatomegaly and hepatic steatosis., Cholelithiasis., 2. 14 mm low-density area in the pancreatic tail. Recommend evaluation with outpatient MRI abdomen with MRCP., 3. No hydronephrosis or nephrolithiasis.

## 2024-03-18 LAB — BACTERIA UR CULT: ABNORMAL

## 2024-05-07 DIAGNOSIS — I50.22 CHRONIC SYSTOLIC CHF (CONGESTIVE HEART FAILURE), NYHA CLASS 2 (HCC): ICD-10-CM

## 2024-05-07 DIAGNOSIS — I42.8 NICM (NONISCHEMIC CARDIOMYOPATHY) (HCC): ICD-10-CM

## 2024-05-07 RX ORDER — TORSEMIDE 20 MG/1
40 TABLET ORAL 2 TIMES DAILY
Qty: 360 TABLET | Refills: 1 | Status: SHIPPED | OUTPATIENT
Start: 2024-05-07

## 2024-05-09 DIAGNOSIS — E78.5 HYPERLIPIDEMIA LDL GOAL <70: ICD-10-CM

## 2024-05-09 RX ORDER — ATORVASTATIN CALCIUM 20 MG/1
20 TABLET, FILM COATED ORAL DAILY
Qty: 90 TABLET | Refills: 1 | Status: SHIPPED | OUTPATIENT
Start: 2024-05-09

## 2024-06-03 ENCOUNTER — NURSE TRIAGE (OUTPATIENT)
Age: 59
End: 2024-06-03

## 2024-06-03 NOTE — TELEPHONE ENCOUNTER
----- Message from Piotr CRUZ sent at 6/3/2024 11:34 AM EDT -----  Patient's spouse Agustina [phone] contacting office patient is experiencing more frequent SOB and fluid retention.  Patient is established with Dr. Tidwell.

## 2024-06-03 NOTE — TELEPHONE ENCOUNTER
"Reason for Disposition  • Difficulty breathing with exertion AND worsening or new-onset    Answer Assessment - Initial Assessment Questions  1. ONSET: \"When did the swelling start?\" (e.g., minutes, hours, days)      Lower legs bilaterally, last week started  2. LOCATION: \"What part of the leg is swollen?\"  \"Are both legs swollen or just one leg?\"  Bilaterally ankles to feet        3. SEVERITY: \"How bad is the swelling?\" (e.g., localized; mild, moderate, severe)   - Localized - small area of swelling localized to one leg     - MILD pedal edema - swelling limited to foot and ankle, pitting edema < 1/4 inch (6 mm) deep, rest and elevation eliminate most or all swelling     - MODERATE edema - swelling of lower leg to knee, pitting edema > 1/4 inch (6 mm) deep, rest and elevation only partially reduce swelling   Wife feels it is more moderate swelling   - SEVERE edema - swelling extends above knee, facial or hand swelling present         4. REDNESS: \"Does the swelling look red or infected?\"      no  5. PAIN: \"Is the swelling painful to touch?\" If Yes, ask: \"How painful is it?\"   (Scale 1-10; mild, moderate or severe)      No   6. FEVER: \"Do you have a fever?\" If Yes, ask: \"What is it, how was it measured, and when did it start?\"       No   7. CAUSE: \"What do you think is causing the leg swelling?\"      CHF, unsure   8. MEDICAL HISTORY: \"Do you have a history of heart failure, kidney disease, liver failure, or cancer?\"      CHF,tachycardia  9. RECURRENT SYMPTOM: \"Have you had leg swelling before?\" If Yes, ask: \"When was the last time?\" \"What happened that time?\"      Has occurred in the past off and on. More noticeable in the last week   10. OTHER SYMPTOMS: \"Do you have any other symptoms?\" (e.G., chest pain, difficulty breathing)        SOB on exertion, feeling very tired    No chest pain, no palpation, weakness   11. PREGNANCY: \"Is there any chance you are pregnant?\" \"When was your last menstrual period?\"        " BN/a    Pt does not want to see anyone else, only Dr Tidwell, appt schedule 6/20/2024 11a    Protocols used: Leg Swelling and Edema-ADULT-[place]

## 2024-06-04 DIAGNOSIS — I50.33 ACUTE ON CHRONIC HEART FAILURE WITH PRESERVED EJECTION FRACTION (HFPEF) (HCC): Primary | ICD-10-CM

## 2024-06-04 RX ORDER — METOLAZONE 5 MG/1
5 TABLET ORAL AS NEEDED
Qty: 30 TABLET | Refills: 3 | Status: SHIPPED | OUTPATIENT
Start: 2024-06-04

## 2024-06-18 NOTE — PROGRESS NOTES
Heart Failure Outpatient Progress Note - Dante Keith 58 y.o. male MRN: [id number]    @ Encounter: [id number]      Assessment/Plan:    Patient Active Problem List    Diagnosis Date Noted    KYRIE (obstructive sleep apnea) 07/24/2023    Acute on chronic combined systolic and diastolic congestive heart failure ([facility]) 06/29/2017    Essential hypertension 06/29/2017    Morbid obesity due to excess calories ([facility]) 06/29/2017    VIRA (acute kidney injury) ([facility]) 06/29/2017    Non-sustained ventricular tachycardia ([facility]) 11/20/2023       Assessment/Plan:  # Chronic systolic Heart failure, Stage C, NYHA 2  Etiology: NICM- obesity, HTN     Weight: 378 lbs--> 401 lbs--> 416 lbs  NT proBNP: 3/7/20: 348     Studies- personally reviewed by me  Echo 1/11/23:  LVEF: 60%  RV: mildly dilated, normal function     Echo 6/13/19:  LVEF: 50%, grade 2 DD  LVIDd: 6.2 cm  RV: normal  MR: mild     Echocardiogram 11/16/17  LVEF: 35%  LVIDd: 6.5  RV: dilated     Echo June 2017  LVEF: 35%     Neurohormonal Blockade:  --Beta-Blocker: coreg 12.5 mg BID  --ACEi, ARB or ARNi: hydralazine 25 mg TID, Imdur 30 mg daily    (or SVR reduction)  --Aldosterone Receptor Blocker: gynecomastia from spironolactone and inspra so off of them now  --Diuretic: torsemide 80 mg daily, K 20 meq daily with metolazone prn     Sudden Cardiac Death Risk Reduction:  --ICD: EF now around 50%     Electrical Resynchronization:  --narrow QRS     Advanced Therapies (If appropriate):  --Inotrope:  --LVAD/Transplant Candidacy:     # Morbid obesity- BMI 68, recommend gastric bypass eval  # hyperlipidemia - atorvastatin 20 mg   3/4/23: LDL 48, HDL 36, Tri 165  7/2/22: LDL 59, HDL 33, Tri 165- post statin  12/4/21: Tri 177, HDL 35,  - pre statin  9/25: , HDL 32  # HTN- not controlled and accelerated, but in pain from plantar fascitis  # KYRIE- BiPAP  # CKD, Cr 1.1 on 3/16/24     TODAY'S PLAN:  BP accelerated, increase hydralazine to 50 mg TID  Continue lasix 80 mg daily  for HF  Would consider SGLT2i, relatively recent UTI  LDL much better from 142 to 48 with statin  Continue BiPap for KYRIE  Unfortunately limited mobility due to right knee pain- arthritis  Recommend ozempic  Offered PT for plantar fascitis  --2g sodium diet  - Daily weights     HPI:   57 yo male presents for follow up of systolic heart failure. He follows with Dr. Arriola for BiV heart failure, obesity and KYRIE. He states he did not have medical insurance for 2.5 years and then got Medicare, went to see his family doctor who sent him to ER due to his volume overload. He had not been on medicines. He is down 24 lbs since his admission. He reports he had a cardiac cath back around 2010 which was negative.   He was discharged from the hospital on coreg 6.25 mg BID, hydralazine and imdur. His last Cr was 1.39. He was discharged on lasix 40 mg BID. HE is low sodium in his diet. He had lost 12 more lbs since his discharge. He is getting cramping.     On my last follow up his weight was up more as drinking too much so I educated about amount to drink.   Also discussed gastric bypass as BMI was 67.   Also continued to recommend ICD for primary prevention.      Interval History:  Wt was up 24 lbs  Called 6/3 difficulty breathing, mild edema  Wt up even more today  BP accelerated  Plantar fasciitis pain  Review of Systems   Constitutional:  Negative for activity change, appetite change, fatigue and unexpected weight change (wt up 24 lbs).   HENT:  Negative for congestion and nosebleeds.    Eyes: Negative.    Respiratory:  Negative for cough, chest tightness and shortness of breath.    Cardiovascular:  Negative for chest pain, palpitations and leg swelling.   Gastrointestinal:  Negative for abdominal distention.   Endocrine: Negative.    Genitourinary: Negative.    Musculoskeletal: Negative.    Skin: Negative.    Neurological:  Negative for dizziness, syncope and weakness.   Hematological: Negative.    Psychiatric/Behavioral:  Negative.         Past Medical History:   Diagnosis Date    Arthritis     CHF (congestive heart failure) (HCC)     Hx of cardiac cath     Hypertension     KYRIE treated with BiPAP          Allergies   Allergen Reactions    Penicillins      .    Current Outpatient Medications:     aspirin 81 mg chewable tablet, Chew 1 tablet daily, Disp: , Rfl: 0    atorvastatin (LIPITOR) 20 mg tablet, Take 1 tablet (20 mg total) by mouth daily, Disp: 90 tablet, Rfl: 1    carvedilol (COREG) 12.5 mg tablet, TAKE 1 TABLET (12.5 MG TOTAL) BY MOUTH 2 (TWO) TIMES A DAY, Disp: 180 tablet, Rfl: 3    diclofenac (VOLTAREN) 75 mg EC tablet, prn, Disp: , Rfl: 1    hydrALAZINE (APRESOLINE) 25 mg tablet, TAKE 1 TABLET BY MOUTH THREE TIMES A DAY, Disp: 270 tablet, Rfl: 3    isosorbide mononitrate (IMDUR) 30 mg 24 hr tablet, Take 1 tablet (30 mg total) by mouth daily, Disp: 90 tablet, Rfl: 0    Magnesium 500 MG TABS, Take 1 capsule by mouth daily , Disp: , Rfl:     metolazone (ZAROXOLYN) 5 mg tablet, Take 1 tablet (5 mg total) by mouth if needed (fluid weight gain of 4 lbs), Disp: 30 tablet, Rfl: 3    naproxen (NAPROSYN) 500 mg tablet, Take 1 tablet (500 mg total) by mouth 2 (two) times a day with meals for 7 days (Patient taking differently: Take 500 mg by mouth as needed), Disp: 14 tablet, Rfl: 0    potassium chloride (Klor-Con M20) 20 mEq tablet, Take 2 tablets (40 mEq total) by mouth daily, Disp: 180 tablet, Rfl: 3    torsemide (DEMADEX) 20 mg tablet, TAKE 2 TABLETS BY MOUTH 2 TIMES A DAY., Disp: 360 tablet, Rfl: 1    clotrimazole-betamethasone (LOTRISONE) 1-0.05 % cream, Apply topically 2 (two) times a day, Disp: , Rfl:     tamsulosin (FLOMAX) 0.4 mg, TAKE 1 CAPSULE BY MOUTH EVERY DAY AT NIGHT (Patient not taking: Reported on 10/13/2022), Disp: , Rfl:     Social History     Socioeconomic History    Marital status: /Civil Union     Spouse name: Not on file    Number of children: Not on file    Years of education: Not on file    Highest  education level: Not on file   Occupational History    Not on file   Tobacco Use    Smoking status: Never    Smokeless tobacco: Never   Vaping Use    Vaping status: Never Used   Substance and Sexual Activity    Alcohol use: No    Drug use: No    Sexual activity: Not Currently   Other Topics Concern    Not on file   Social History Narrative    Not on file     Social Determinants of Health     Financial Resource Strain: Low Risk  (11/25/2023)    Received from [facility], [facility]    Overall Financial Resource Strain (CARDIA)     Difficulty of Paying Living Expenses: Not hard at all   Food Insecurity: No Food Insecurity (11/25/2023)    Received from [facility], [facility]    Hunger Vital Sign     Worried About Running Out of Food in the Last Year: Never true     Ran Out of Food in the Last Year: Never true   Transportation Needs: No Transportation Needs (11/25/2023)    Received from [facility], [facility]    PRAPARE - Transportation     Lack of Transportation (Medical): No     Lack of Transportation (Non-Medical): No   Physical Activity: Not on file   Stress: No Stress Concern Present (11/25/2023)    Received from [facility], [facility]    [employer] - Occupational Stress Questionnaire     Feeling of Stress : Only a little   Social Connections: Socially Isolated (11/25/2023)    Received from [facility], [facility]    Social Connection and Isolation Panel [NHANES]     Frequency of Communication with Friends and Family: Never     Frequency of Social Gatherings with Friends and Family: Never     Attends Zoroastrianism Services: Never     Active Member of Clubs or Organizations: No     Attends Club or Organization Meetings: Never     Marital Status:    Intimate Partner Violence: Not At Risk (11/25/2023)     Received from [facility], [facility]    Humiliation, Afraid, Rape, and Kick questionnaire     Fear of Current or Ex-Partner: No     Emotionally Abused: No     Physically Abused: No     Sexually Abused: No   Housing Stability: Unknown (11/25/2023)    Received from [facility], [facility]    Housing Stability Vital Sign     Unable to Pay for Housing in the Last Year: No     Number of Places Lived in the Last Year: Not on file     Unstable Housing in the Last Year: No       No family history on file.    Physical Exam:    Vitals:     Physical Exam    Labs & Results:    Lab Results   Component Value Date    SODIUM 140 03/16/2024    K 3.7 03/16/2024     03/16/2024    CO2 27 03/16/2024    BUN 21 03/16/2024    CREATININE 1.08 03/16/2024    GLUC 117 03/16/2024    CALCIUM 9.5 03/16/2024     Lab Results   Component Value Date    WBC 11.30 (H) 03/16/2024    HGB 15.3 03/16/2024    HCT 47.5 03/16/2024    MCV 91 03/16/2024     03/16/2024     Lab Results   Component Value Date    NTBNP 348 (H) 03/07/2020      Lab Results   Component Value Date    CHOLESTEROL 128 02/24/2024    CHOLESTEROL 108 03/04/2023    CHOLESTEROL 125 07/02/2022     Lab Results   Component Value Date    HDL 39 (L) 02/24/2024    HDL 36 (L) 03/04/2023    HDL 33 (L) 07/02/2022     Lab Results   Component Value Date    TRIG 146 02/24/2024    TRIG 120 03/04/2023    TRIG 165 (H) 07/02/2022     Lab Results   Component Value Date    NONHDLC 89 02/24/2024    NONHDLC 72 03/04/2023    NONHDLC 92 07/02/2022       EKG personally reviewed by Haris Tidwell.     Counseling / Coordination of Care  Time spent today 25 minutes.  Greater than 50% of total time was spent with the patient and / or family counseling and / or coordination of care. We went over current diagnosis, most recent studies and any changes in treatment.    Thank you for the opportunity to participate in the care of this  patient.    MARIETTA BLOOD D.O.  DIRECTOR OF HEART FAILURE/ PULMONARY HYPERTENSION  MEDICAL DIRECTOR OF LVAD PROGRAM  [facility]

## 2024-06-20 ENCOUNTER — OFFICE VISIT (OUTPATIENT)
Dept: CARDIOLOGY CLINIC | Facility: CLINIC | Age: 59
End: 2024-06-20
Payer: MEDICARE

## 2024-06-20 VITALS
OXYGEN SATURATION: 97 % | WEIGHT: 315 LBS | HEIGHT: 64 IN | SYSTOLIC BLOOD PRESSURE: 188 MMHG | DIASTOLIC BLOOD PRESSURE: 80 MMHG | BODY MASS INDEX: 53.78 KG/M2 | HEART RATE: 76 BPM

## 2024-06-20 DIAGNOSIS — G47.33 OSA (OBSTRUCTIVE SLEEP APNEA): ICD-10-CM

## 2024-06-20 DIAGNOSIS — I47.29 NON-SUSTAINED VENTRICULAR TACHYCARDIA (HCC): ICD-10-CM

## 2024-06-20 DIAGNOSIS — I10 ESSENTIAL HYPERTENSION: Chronic | ICD-10-CM

## 2024-06-20 DIAGNOSIS — E66.01 MORBID OBESITY DUE TO EXCESS CALORIES (HCC): ICD-10-CM

## 2024-06-20 DIAGNOSIS — I50.43 ACUTE ON CHRONIC COMBINED SYSTOLIC AND DIASTOLIC CONGESTIVE HEART FAILURE (HCC): Primary | Chronic | ICD-10-CM

## 2024-06-20 DIAGNOSIS — I10 HTN (HYPERTENSION), BENIGN: ICD-10-CM

## 2024-06-20 PROCEDURE — 99214 OFFICE O/P EST MOD 30 MIN: CPT | Performed by: INTERNAL MEDICINE

## 2024-06-20 RX ORDER — HYDRALAZINE HYDROCHLORIDE 50 MG/1
50 TABLET, FILM COATED ORAL 3 TIMES DAILY
Qty: 270 TABLET | Refills: 3 | Status: SHIPPED | OUTPATIENT
Start: 2024-06-20

## 2024-06-20 NOTE — PATIENT INSTRUCTIONS
Increase hydralazine to 50 mg three times daily    Look on amaz[employer] for plantar fasciitis boot    I offered PT for plantar fasciitis

## 2024-06-29 DIAGNOSIS — I50.33 ACUTE ON CHRONIC HEART FAILURE WITH PRESERVED EJECTION FRACTION (HFPEF) (HCC): ICD-10-CM

## 2024-06-30 RX ORDER — METOLAZONE 5 MG/1
5 TABLET ORAL AS NEEDED
Qty: 90 TABLET | Refills: 1 | Status: SHIPPED | OUTPATIENT
Start: 2024-06-30

## 2024-08-01 ENCOUNTER — OFFICE VISIT (OUTPATIENT)
Dept: GASTROENTEROLOGY | Facility: AMBULARY SURGERY CENTER | Age: 59
End: 2024-08-01
Payer: MEDICARE

## 2024-08-01 VITALS
SYSTOLIC BLOOD PRESSURE: 150 MMHG | WEIGHT: 315 LBS | BODY MASS INDEX: 53.78 KG/M2 | DIASTOLIC BLOOD PRESSURE: 90 MMHG | HEART RATE: 84 BPM | OXYGEN SATURATION: 96 % | HEIGHT: 64 IN

## 2024-08-01 DIAGNOSIS — K76.0 HEPATIC STEATOSIS: ICD-10-CM

## 2024-08-01 DIAGNOSIS — R16.0 HEPATOMEGALY: ICD-10-CM

## 2024-08-01 DIAGNOSIS — K86.9 PANCREATIC LESION: ICD-10-CM

## 2024-08-01 DIAGNOSIS — Z11.59 ENCOUNTER FOR SCREENING FOR OTHER VIRAL DISEASES: ICD-10-CM

## 2024-08-01 PROCEDURE — 99203 OFFICE O/P NEW LOW 30 MIN: CPT | Performed by: FAMILY MEDICINE

## 2024-08-01 NOTE — Clinical Note
Chiara Gonzalez,   Mr. Keith was found to have a 1.4 cm pancreatic lesion on CT scan. He cannot tolerating lying flat for an MRI/MRP and is extremely apprehensive to undergo an EUS. Are there any other options for evaluation and/or surveillance? I didn't think that a CT with pancreatic protocol would provide much additional information. Thank you for reviewing this case and letting me know your thoughts!   Best, Rosa Meade

## 2024-08-01 NOTE — PROGRESS NOTES
[facility] - Outpatient Consultation  Dante Keith 58 y.o. male MRN: [id number]  Encounter: [id number]          ASSESSMENT AND PLAN:      1. Hepatomegaly  2. Hepatic steatosis  Patient was incidentally found to have hepatomegaly and hepatic steatosis on cross-sectional imaging for the evaluation of hematuria.  His liver tests have been historically within normal limits. His serologies also show normal liver synthetic function and preserved platelet count. No clinical, serologic or radiographic evidence of chronic liver disease.    Suspect patient likely has MASLD in the setting of multiple metabolic risk factors. Discussed the basic pathophysiology of fatty liver disease and potential to progress to cirrhosis if left untreated. Ordered serologies to assess liver function, screen for viral hepatitis, evaluate for A1AT Pi* carrier status and assess patient's immunity to hep A and B. He will also complete an U/S with elastography to help quantify steatosis and better assess for advanced fibrosis.     Discussed recommendations regarding the treatment for fatty liver particularly including steady and sustainable weight loss of approx 10-15% of patient's current body, optimization of their metabolic risk factors and limiting their alcohol use. Offered patient a referral to weight management particularly for consideration of a GLP-1a given that these medications have been seen to help reduce his steatohepatitis and reverse hepatic fibrosis. Patient agreed and referral placed. Otherwise, recommend they continue to have their liver tests monitored q6 months with routine labs.     NAFLD Fibrosis Score is: 1.877    NAFLD Score Correlated Fibrosis Severity   <-1.455 F0-F2   -1.455-0.676 Indeterminate Score   >0.676 F3-F4   **Fibrosis Severity Scale: F0 = no fibrosis; F1= mild fibrosis; F2 = moderate fibrosis; F3 = severe fibrosis; F4 = cirrhosis    - Ambulatory Referral to  "Gastroenterology  - CBC and differential; Future  - Comprehensive metabolic panel; Future  - Protime-INR; Future  - Alpha 1 Antitrypsin Phenotype; Future  - Chronic Hepatitis Panel; Future  - Hepatitis A antibody, total; Future  - Hepatitis B surface antibody; Future  - US elastography/UGAP; Future  - Ambulatory Referral to Weight Management; Future    3. Pancreatic lesion  Patient was incidentally noted to have a 14 mm low-density area in the pancreatic tail on cross-sectional imaging for the evaluation of hematuria. No family history of pancreatic cancer or alarm features. He has not had a subsequent MRI/MRCP due to his inability to lie flat for long periods of time because of cramping and frequent urination.     Alternatively, recommended an EUS for direct visualization, characterization and possible sampling of this lesion.  He is extremely apprehensive to undergo procedure requiring sedation given that he has \"woken up\" make procedure before. Advised that provider would discuss his results with an advanced endoscopist to determine if there are any alternative options for evaluation/surveillance in order to avoid a procedure.    - Ambulatory Referral to Gastroenterology    Follow-up in 3 months or sooner if necessary.     ______________________________________________________________________    HPI: Patient is a 58 y.o. male with PMH significant for CHF, HTN, KYRIE and obesity who presents today for consultation regarding abnormal imaging of the liver and pancreas.    Patient was evaluated in the ED in March for hematuria and had a contrasted CT scan which showed hepatomegaly, hepatic steatosis, multiple sub-cm hepatic lesions (statistically benign) and a 14 mm low-density area in the pancreatic tail for which further evaluation with an MRI/MRCP was recommended. He states that he has not had a subsequent MRI because he is unable to lie flat for an extended period of time without intense muscle cramps and the need " to urinate. His serologies showed a normal lipase, normal liver tests and preserved platelet count.    Denies a personal history of chronic liver disease or malignancy. Denies a known family history of liver, pancreatic or gallbladder cancers. He believes he had a grandfather with cirrhosis from alcohol use.  Denies a known past or current infection with viral hepatitis. Denies EtOH use.    Denies fever/chills, nausea/vomiting, dark-colored urine, acholic stools, abdominal pain, yellowing of his eyes and/or skin or unintentional weight loss. He is not diabetic.    Of note, he does have a history of HSP.      REVIEW OF SYSTEMS:    CONSTITUTIONAL: Denies any fever, chills, rigors, and weight loss.  HEENT: No earache or tinnitus. Denies hearing loss or visual disturbances.  CARDIOVASCULAR: No chest pain or palpitations.   RESPIRATORY: Denies any cough, hemoptysis, shortness of breath or dyspnea on exertion.  GASTROINTESTINAL: As noted in the History of Present Illness.   GENITOURINARY: No problems with urination. Denies any hematuria or dysuria.  NEUROLOGIC: No dizziness or vertigo, denies headaches.   MUSCULOSKELETAL: Denies any muscle or joint pain.   SKIN: Denies skin rashes or itching.   ENDOCRINE: Denies excessive thirst. Denies intolerance to heat or cold.  PSYCHOSOCIAL: Denies depression or anxiety. Denies any recent memory loss.       Historical Information   Past Medical History:   Diagnosis Date   • Arthritis    • CHF (congestive heart failure) (HCC)    • Hx of cardiac cath    • Hypertension    • KYRIE treated with BiPAP      Past Surgical History:   Procedure Laterality Date   • ABDOMINAL SURGERY      stomach cauterization post emesis   • KNEE ARTHROSCOPY Right      Social History   Social History     Substance and Sexual Activity   Alcohol Use No     Social History     Substance and Sexual Activity   Drug Use No     Social History     Tobacco Use   Smoking Status Never   Smokeless Tobacco Never     History  "reviewed. No pertinent family history.    Meds/Allergies       Current Outpatient Medications:   •  aspirin 81 mg chewable tablet  •  atorvastatin (LIPITOR) 20 mg tablet  •  carvedilol (COREG) 12.5 mg tablet  •  diclofenac (VOLTAREN) 75 mg EC tablet  •  hydrALAZINE (APRESOLINE) 50 mg tablet  •  isosorbide mononitrate (IMDUR) 30 mg 24 hr tablet  •  Magnesium 500 MG TABS  •  metolazone (ZAROXOLYN) 5 mg tablet  •  potassium chloride (Klor-Con M20) 20 mEq tablet  •  torsemide (DEMADEX) 20 mg tablet  •  clotrimazole-betamethasone (LOTRISONE) 1-0.05 % cream  •  naproxen (NAPROSYN) 500 mg tablet  •  tamsulosin (FLOMAX) 0.4 mg    Allergies   Allergen Reactions   • Penicillins            Objective     Blood pressure 150/90, pulse 84, height 5' 4\" (1.626 m), weight (!) 186 kg (410 lb 6.4 oz), SpO2 96%. Body mass index is 70.44 kg/m².        PHYSICAL EXAM:      General Appearance:   Alert, cooperative, no distress   HEENT:   Normocephalic, atraumatic, anicteric.     Neck:  Supple, symmetrical, trachea midline   Lungs:   Clear to auscultation bilaterally; no rales, rhonchi or wheezing; respirations unlabored    Heart::   Regular rate and rhythm; no murmur, rub, or gallop.   Abdomen:   Soft, non-tender, non-distended; normal bowel sounds; no masses, no organomegaly    Genitalia:   Deferred    Rectal:   Deferred    Extremities:  No cyanosis, clubbing or edema    Pulses:  2+ and symmetric    Skin:  No jaundice, rashes, or lesions    Lymph nodes:  No palpable cervical lymphadenopathy        Lab Results:   No visits with results within 1 Day(s) from this visit.   Latest known visit with results is:   Admission on 03/16/2024, Discharged on 03/16/2024   Component Date Value   • WBC 03/16/2024 11.30 (H)    • RBC 03/16/2024 5.22    • Hemoglobin 03/16/2024 15.3    • Hematocrit 03/16/2024 47.5    • MCV 03/16/2024 91    • MCH 03/16/2024 29.3    • MCHC 03/16/2024 32.2    • RDW 03/16/2024 13.5    • MPV 03/16/2024 10.5    • Platelets " 03/16/2024 242    • nRBC 03/16/2024 0    • Segmented % 03/16/2024 74    • Immature Grans % 03/16/2024 1    • Lymphocytes % 03/16/2024 13 (L)    • Monocytes % 03/16/2024 8    • Eosinophils Relative 03/16/2024 3    • Basophils Relative 03/16/2024 1    • Absolute Neutrophils 03/16/2024 8.37 (H)    • Absolute Immature Grans 03/16/2024 0.14    • Absolute Lymphocytes 03/16/2024 1.45    • Absolute Monocytes 03/16/2024 0.86    • Eosinophils Absolute 03/16/2024 0.36    • Basophils Absolute 03/16/2024 0.12 (H)    • Sodium 03/16/2024 140    • Potassium 03/16/2024 3.7    • Chloride 03/16/2024 105    • CO2 03/16/2024 27    • ANION GAP 03/16/2024 8    • BUN 03/16/2024 21    • Creatinine 03/16/2024 1.08    • Glucose 03/16/2024 117    • Calcium 03/16/2024 9.5    • AST 03/16/2024 27    • ALT 03/16/2024 42    • Alkaline Phosphatase 03/16/2024 77    • Total Protein 03/16/2024 7.9    • Albumin 03/16/2024 4.1    • Total Bilirubin 03/16/2024 0.59    • eGFR 03/16/2024 75    • Lipase 03/16/2024 29    • Color, UA 03/16/2024 Light Orange    • Clarity, UA 03/16/2024 Turbid    • Specific Gravity, UA 03/16/2024 1.014    • pH, UA 03/16/2024 6.5    • Leukocytes, UA 03/16/2024 Large (A)    • Nitrite, UA 03/16/2024 Negative    • Protein, UA 03/16/2024 300 (3+) (A)    • Glucose, UA 03/16/2024 Negative    • Ketones, UA 03/16/2024 Negative    • Urobilinogen, UA 03/16/2024 <2.0    • Bilirubin, UA 03/16/2024 Negative    • Occult Blood, UA 03/16/2024 Large (A)    • RBC, UA 03/16/2024 Innumerable (A)    • WBC, UA 03/16/2024 Innumerable (A)    • Epithelial Cells 03/16/2024 None Seen    • Bacteria, UA 03/16/2024 Occasional    • WBC Clumps 03/16/2024 Present    • Urine Culture 03/16/2024 10,000-19,000 cfu/ml Proteus mirabilis (A)          Radiology Results:   No results found.    Rosa Meade PA-C     **Please note: Dictation voice to text software may have been used in the creation of this record. Occasional wrong word or “sound alike” substitutions may  have occurred due to the inherent limitations of voice recognition software. Read the chart carefully and recognize, using context, where substitutions have occurred.**

## 2024-08-05 NOTE — PROGRESS NOTES
Called Dr.. Calvert's office, spoke with Mayra to confirm fax number [phone]. Faxed ov notes to 's office. Scanned fax conf. in pt's chart.

## 2024-08-12 ENCOUNTER — HOSPITAL ENCOUNTER (OUTPATIENT)
Dept: ULTRASOUND IMAGING | Facility: HOSPITAL | Age: 59
Discharge: HOME | End: 2024-08-12
Payer: MEDICARE

## 2024-08-12 ENCOUNTER — TELEPHONE (OUTPATIENT)
Dept: GASTROENTEROLOGY | Facility: CLINIC | Age: 59
End: 2024-08-12

## 2024-08-12 DIAGNOSIS — K76.0 HEPATIC STEATOSIS: ICD-10-CM

## 2024-08-12 DIAGNOSIS — R16.0 HEPATOMEGALY: ICD-10-CM

## 2024-08-12 PROCEDURE — 76981 USE PARENCHYMA: CPT

## 2024-08-12 NOTE — TELEPHONE ENCOUNTER
----- Message from Rosa Meade PA-C sent at 8/12/2024  3:54 PM EDT -----  Clerical team, can we please call Mr. Keith and schedule him with one of our advanced endoscopists  - Dr. Chairez, Dr. Gonzalez or Dr. Delia Perez - to discuss his options for surveillance of a pancreatic lesion. He was informed of this plan and is aware that someone will reach out to assist him with scheduling. Thank you!

## 2024-08-16 ENCOUNTER — OFFICE VISIT (OUTPATIENT)
Dept: BARIATRICS | Facility: CLINIC | Age: 59
End: 2024-08-16
Payer: MEDICARE

## 2024-08-16 VITALS
HEART RATE: 81 BPM | BODY MASS INDEX: 55.81 KG/M2 | HEIGHT: 63 IN | SYSTOLIC BLOOD PRESSURE: 150 MMHG | DIASTOLIC BLOOD PRESSURE: 90 MMHG | WEIGHT: 315 LBS

## 2024-08-16 DIAGNOSIS — K76.0 HEPATIC STEATOSIS: ICD-10-CM

## 2024-08-16 DIAGNOSIS — E66.01 CLASS 3 SEVERE OBESITY DUE TO EXCESS CALORIES WITH SERIOUS COMORBIDITY AND BODY MASS INDEX (BMI) GREATER THAN OR EQUAL TO 70 IN ADULT (HCC): Primary | ICD-10-CM

## 2024-08-16 DIAGNOSIS — R16.0 HEPATOMEGALY: ICD-10-CM

## 2024-08-16 DIAGNOSIS — Z12.11 SCREENING FOR COLON CANCER: ICD-10-CM

## 2024-08-16 PROBLEM — E66.813 CLASS 3 SEVERE OBESITY DUE TO EXCESS CALORIES WITH SERIOUS COMORBIDITY AND BODY MASS INDEX (BMI) GREATER THAN OR EQUAL TO 70 IN ADULT (HCC): Status: ACTIVE | Noted: 2017-06-29

## 2024-08-16 PROCEDURE — 99204 OFFICE O/P NEW MOD 45 MIN: CPT | Performed by: INTERNAL MEDICINE

## 2024-08-16 RX ORDER — METFORMIN HCL 500 MG
TABLET, EXTENDED RELEASE 24 HR ORAL
Qty: 60 TABLET | Refills: 3 | Status: SHIPPED | OUTPATIENT
Start: 2024-08-16

## 2024-08-16 NOTE — ASSESSMENT & PLAN NOTE
"Reviewed diet recall with the patient and explained the importance of staying in a calorie deficit to facilitate weight loss.  Advised incorporating more protein with meals and snacks and avoid skipping meals.  Monitor calories from fat and processed foods as that could be contributing to excess calories.  Increase hydration, decrease caffeine intake.  He currently intake 64 ounces of green tea and 24 ounces of Diet Coke.  Patient to meet with dietitian along with his wife who has a medical visit plan for the future.  Activity is currently limited-recommended chair yoga exercises from [employer] and/or resistance band training he reports his sleep is fragmented and he has a lot of daytime napping which could be secondary to inadequately treated sleep apnea.  He has to have his BiPAP for KYRIE evaluated as the settings have not been changed over the last 7 years years.  Will place ambulatory referral to sleep medicine.  Discussed reciprocal relationship with KYRIE and weight.  Given class III severe obesity with BMI of 72.88, explained to the patient limitations of available medical options.  Requested patient to inquire with the insurance regarding coverage for injectable GLP-1 medications that would be necessary for any clinically meaningful weight loss.  Patient has uncontrolled hypertension with blood pressure 150/90 so will not be able to use phentermine.  Only other medication options would be metformin Topamax Wellbutrin and naltrexone as he does not have any contraindications.  However I discussed with patient and wife these options may be insufficient to again for clinically meaningful weight loss.  Presented the option of bariatric surgery and various surgical options were discussed.  Patient reports that he does not want to go through surgery as f most of his family members \"have had problems\".  We decided to start with metformin and we will consider adding Topamax at follow-up visits.  "

## 2024-08-16 NOTE — PROGRESS NOTES
Assessment/Plan     Dante Keith is 58 y.o. year old male  who comes in for consultation for assistance with weight management.     - Discussed options of HealthyCORE-Intensive Lifestyle Intervention Program, Very Low Calorie Diet-VLCD, and Conservative Program and the role of weight loss medications.  - Patient is interested in pursuing Conservative Program    Class 3 severe obesity due to excess calories with serious comorbidity and body mass index (BMI) greater than or equal to 70 in adult (HCC)  Reviewed diet recall with the patient and explained the importance of staying in a calorie deficit to facilitate weight loss.  Advised incorporating more protein with meals and snacks and avoid skipping meals.  Monitor calories from fat and processed foods as that could be contributing to excess calories.  Increase hydration, decrease caffeine intake.  He currently intake 64 ounces of green tea and 24 ounces of Diet Coke.  Patient to meet with dietitian along with his wife who has a medical visit plan for the future.  Activity is currently limited-recommended chair yoga exercises from [employer] and/or resistance band training he reports his sleep is fragmented and he has a lot of daytime napping which could be secondary to inadequately treated sleep apnea.  He has to have his BiPAP for KYRIE evaluated as the settings have not been changed over the last 7 years years.  Will place ambulatory referral to sleep medicine.  Discussed reciprocal relationship with KYRIE and weight.  Given class III severe obesity with BMI of 72.88, explained to the patient limitations of available medical options.  Requested patient to inquire with the insurance regarding coverage for injectable GLP-1 medications that would be necessary for any clinically meaningful weight loss.  Patient has uncontrolled hypertension with blood pressure 150/90 so will not be able to use phentermine.  Only other medication options would be metformin Topamax  "Wellbutrin and naltrexone as he does not have any contraindications.  However I discussed with patient and wife these options may be insufficient to again for clinically meaningful weight loss.  Presented the option of bariatric surgery and various surgical options were discussed.  Patient reports that he does not want to go through surgery as f most of his family members \"have had problems\".  We decided to start with metformin and we will consider adding Topamax at follow-up visits.    Dante was seen today for consult.    Diagnoses and all orders for this visit:    Class 3 severe obesity due to excess calories with serious comorbidity and body mass index (BMI) greater than or equal to 70 in adult (HCC)  -     metFORMIN (GLUCOPHAGE-XR) 500 mg 24 hr tablet; Start with 500 mg orally once daily with the evening meal increase in two weeks to 1000 mg (two tablets) if tolerated    Hepatomegaly  -     Ambulatory Referral to Weight Management    Hepatic steatosis  -     Ambulatory Referral to Weight Management    Screening for colon cancer  -     Ambulatory referral to Gastroenterology; Future          -In addition, please follow general recommendations below.          Return visit:  6-8 weeks        General Lifestyle recommendations:    Nutrition   -Avoid skipping meals. Avoid sugary beverages. At least 64oz of water daily.  Limit processed food, refined sugars and grain. Encourage  healthy choices for meals and snacks   -Focus on protein goals and non starchy fiber rich vegetables for satiety effect and to help support a calorie deficit.   - Emphasize portion control, well balanced macronutrient's (protein, carbohydrate, fat using MyPlate method )and adequate protein with each meal/snacks and distributing calories equally throughout the day along with.   -Advise starting the day with a protein breakfast   Behavioral/Stress   Food log via luiz or provided paper log (luiz options include www.myfitnesspal.com, " "Robinhood.com, loseit.com, LocalSenseking.com, baritastic). Encouraged mindful eating. Be sure to set aside time to eat, eat slowly, and savor your food. Consider meditation apps and/or taking a few minutes of mindfulness every AM. Understand the role of regarding the role of stress hormone cortisol in promoting weight gain and visceral fat accumulation. Weigh daily or atleast 2-3 times/ week  Physical Activity   Increase physical activity by 10 minutes daily. Gradually increase physical activity to a goal of 5 days per week for 30 minutes of MODERATE intensity ( should be able to pass the \"talk test\" but should not be able to sing. Target 150-300 minutes  PLUS 2 days per week of FULL BODY resistance training. Progression will be addressed at follow up visits. Encouraged contemplation regarding establishing a daily physical activity routine  - Resistance training along with increase protein intake is important to maintain and enhance metabolism  Sleep   Encourage sleep hygiene and importance of having adequate sleep duration at least > 6 hours to support response in weight loss efforts    Handouts provided :  THRIVE program at [employer] [place]  MyPlate and food quality  Food log resources, phone luiz or paper journal  Antiobesity medications options     - Discussed at length and the role of weight loss medications and medication options   - Explained the importance of making lifestyle changes in addition to starting any anti-obesity medications if the  patient chooses.  -  Initial weight loss goal of 5-10% weight loss for improved health  - Weight loss can improve patient's co-morbid conditions and/or prevent weight-related complications.  - Weight is not at goal and patient has been unable to achieve a meaningful weight loss above 5% using various programs and tools for more than 6 months    Dante was seen today for consult.    Diagnoses and all orders for this visit:    Class 3 severe obesity due to excess calories " "with serious comorbidity and body mass index (BMI) greater than or equal to 70 in adult (HCC)  -     metFORMIN (GLUCOPHAGE-XR) 500 mg 24 hr tablet; Start with 500 mg orally once daily with the evening meal increase in two weeks to 1000 mg (two tablets) if tolerated    Hepatomegaly  -     Ambulatory Referral to Weight Management    Hepatic steatosis  -     Ambulatory Referral to Weight Management    Screening for colon cancer  -     Ambulatory referral to Gastroenterology; Future                      Total time spent reviewing chart, interviewing patient, examining patient, discussing plan, answering all questions, and documentin min, with >50% face-to-face time spent counseling patient on nonsurgical interventions for the treatment of excess weight. Discussed in detail nonsurgical options including intensive lifestyle intervention program, very low-calorie diet program and conservative program.  Discussed the role of weight loss medications.  Counseled patient on diet behavior and exercise modification for weight loss.            Lifestyle questionnaire       Diet recall:  B: Leftovers  S: cottage cheese doubles lunch meat   L: skips   S: 2 pm ravioli lunch meat   D: P/V/S  S: Once in a while chips and cookies but not very often  Eating out vs cooking at home- once a week    Beverages  Water-- zero     Caffeine/tea--   diet green tea, 16 oz x 4  SSB- diet coke 24 oz    Alcohol: no  Smoking: no  Drug use: no    Physical Activity --arthritis    Sleep -- STOP- BANG- ;Bi PAP same machine 9 years ago hours nap a lot during the     Occupation-disabled since age 48     Psycho social- with wife            Start date: 2024  Intial weight : 411 lbs  Intial BMI: 72.88 kg/m2  Obesity Class: Above 40- Obesity Class III  Goal weight: 280  lbs      Colonoscopy: Needs an order      History of present illness       Onset--stopped working after diagnosed with \"bad heart\" gained 100 lbs and progressive weight   Was around " 280 lbs prior to diagnosis of heart issue.  Fam hx of obesity-sisters    Food behaviorsstress/emotional eating, boredom eating, snacking/grazing, and struggle controlling portions    Previous Weight loss medications/Weight loss attempts:   Many years ago on phen-fen but no formal or recent weight loss attempts  Patient reports and other history-    Referred by GI  Wt Readings from Last 20 Encounters:   08/16/24 (!) 187 kg (411 lb 6.4 oz)   08/01/24 (!) 186 kg (410 lb 6.4 oz)   06/20/24 (!) 189 kg (416 lb)   03/16/24 (!) 183 kg (403 lb)   11/20/23 (!) 182 kg (401 lb)   07/24/23 (!) 173 kg (381 lb 6.4 oz)   05/08/23 (!) 171 kg (378 lb)   01/11/23 (!) 176 kg (388 lb)   10/13/22 (!) 176 kg (388 lb 11.2 oz)   06/29/22 (!) 182 kg (401 lb 9.6 oz)   03/07/22 (!) 182 kg (402 lb)   06/29/21 (!) 182 kg (402 lb)   05/19/21 (!) 182 kg (402 lb 1.6 oz)   06/25/20 (!) 182 kg (401 lb 6.4 oz)   02/03/20 (!) 181 kg (398 lb)   09/30/19 (!) 175 kg (386 lb)   06/27/19 (!) 175 kg (385 lb)   06/03/19 (!) 176 kg (387 lb 3.2 oz)   02/01/19 (!) 178 kg (392 lb)   10/15/18 (!) 176 kg (389 lb)           Medication considerations/contraindications     -Patient denies personal history of pancreatitis. Patient also denies personal and family history of medullary thyroid cancer and multiple endocrine neoplasia type 2 (MEN 2 tumor). -Patient denies any history of kidney stones, seizures, or glaucoma, diabetic retinopathy, gall bladder disease, hyperthyroidism.  -Denies Hx of CAD, PAD, palpitations, arrhythmia.   -Denies uncontrolled anxiety or depression, suicidal behavior or thinking , insomnia or sleep disturbance.         Past medical history/past surgical history       Previous notes and records have been reviewed.    The following portions of the patient's history were reviewed and updated as appropriate: allergies, current medications, past family history, past medical history, past social history, past surgical history, and problem  "list.    Past Medical History:   Diagnosis Date    Arthritis     CHF (congestive heart failure) (HCC)     Hx of cardiac cath     Hypertension     KYRIE treated with BiPAP          Past Surgical History:   Procedure Laterality Date    ABDOMINAL SURGERY      stomach cauterization post emesis    KNEE ARTHROSCOPY Right              History reviewed. No pertinent family history.         Objective     /90   Pulse 81   Ht 5' 3\" (1.6 m)   Wt (!) 187 kg (411 lb 6.4 oz)   BMI 72.88 kg/m²       Review of Systems   Constitutional:  Negative for fatigue.   HENT:  Negative for sore throat.    Respiratory:  Negative for cough and shortness of breath.    Cardiovascular:  Negative for chest pain, palpitations and leg swelling.   Gastrointestinal:  Negative for abdominal pain, constipation, diarrhea and nausea.   Genitourinary:  Negative for dysuria.   Musculoskeletal:  Negative for arthralgias and back pain.   Skin:  Negative for rash.   Neurological:  Negative for headaches.   Psychiatric/Behavioral:  Negative for dysphoric mood. The patient is not nervous/anxious.        Physical Exam  Vitals and nursing note reviewed.   Constitutional:       Appearance: Normal appearance.   HENT:      Head: Normocephalic.   Neck:      Thyroid: No thyroid mass, thyromegaly or thyroid tenderness.   Cardiovascular:      Rate and Rhythm: Normal rate and regular rhythm.      Pulses: Normal pulses.      Heart sounds: Normal heart sounds.   Pulmonary:      Effort: Pulmonary effort is normal.      Breath sounds: Normal breath sounds.   Abdominal:      General: Abdomen is flat.      Palpations: Abdomen is soft.   Musculoskeletal:      Cervical back: Normal range of motion and neck supple. No tenderness.   Skin:     General: Skin is warm and dry.   Neurological:      General: No focal deficit present.      Mental Status: He is alert and oriented to person, place, and time.   Psychiatric:         Mood and Affect: Mood normal.         Behavior: " Behavior normal.         Thought Content: Thought content normal.         Judgment: Judgment normal.            Medications       Current Outpatient Medications:     aspirin 81 mg chewable tablet, Chew 1 tablet daily, Disp: , Rfl: 0    atorvastatin (LIPITOR) 20 mg tablet, Take 1 tablet (20 mg total) by mouth daily, Disp: 90 tablet, Rfl: 1    carvedilol (COREG) 12.5 mg tablet, TAKE 1 TABLET (12.5 MG TOTAL) BY MOUTH 2 (TWO) TIMES A DAY, Disp: 180 tablet, Rfl: 3    diclofenac (VOLTAREN) 75 mg EC tablet, prn, Disp: , Rfl: 1    hydrALAZINE (APRESOLINE) 50 mg tablet, Take 1 tablet (50 mg total) by mouth 3 (three) times a day, Disp: 270 tablet, Rfl: 3    isosorbide mononitrate (IMDUR) 30 mg 24 hr tablet, Take 1 tablet (30 mg total) by mouth daily, Disp: 90 tablet, Rfl: 0    Magnesium 500 MG TABS, Take 1 capsule by mouth daily , Disp: , Rfl:     metFORMIN (GLUCOPHAGE-XR) 500 mg 24 hr tablet, Start with 500 mg orally once daily with the evening meal increase in two weeks to 1000 mg (two tablets) if tolerated, Disp: 60 tablet, Rfl: 3    potassium chloride (Klor-Con M20) 20 mEq tablet, Take 2 tablets (40 mEq total) by mouth daily, Disp: 180 tablet, Rfl: 3    torsemide (DEMADEX) 20 mg tablet, TAKE 2 TABLETS BY MOUTH 2 TIMES A DAY., Disp: 360 tablet, Rfl: 1    clotrimazole-betamethasone (LOTRISONE) 1-0.05 % cream, Apply topically 2 (two) times a day (Patient not taking: Reported on 8/16/2024), Disp: , Rfl:     metolazone (ZAROXOLYN) 5 mg tablet, TAKE 1 TABLET (5 MG TOTAL) BY MOUTH IF NEEDED (FLUID WEIGHT GAIN OF 4 LBS) (Patient not taking: Reported on 8/16/2024), Disp: 90 tablet, Rfl: 1    naproxen (NAPROSYN) 500 mg tablet, Take 1 tablet (500 mg total) by mouth 2 (two) times a day with meals for 7 days (Patient not taking: Reported on 8/16/2024), Disp: 14 tablet, Rfl: 0    tamsulosin (FLOMAX) 0.4 mg, TAKE 1 CAPSULE BY MOUTH EVERY DAY AT NIGHT (Patient not taking: Reported on 10/13/2022), Disp: , Rfl:            Labs and imaging      Recent labs and imaging have been personally reviewed.  Lab Results   Component Value Date    WBC 11.30 (H) 03/16/2024    HGB 15.3 03/16/2024    HCT 47.5 03/16/2024    MCV 91 03/16/2024     03/16/2024     Lab Results   Component Value Date     06/26/2015    SODIUM 140 03/16/2024    K 3.7 03/16/2024     03/16/2024    CO2 27 03/16/2024    ANIONGAP 8 06/26/2015    AGAP 8 03/16/2024    BUN 21 03/16/2024    CREATININE 1.08 03/16/2024    GLUC 117 03/16/2024    GLUF 116 (H) 02/24/2024    CALCIUM 9.5 03/16/2024    AST 27 03/16/2024    ALT 42 03/16/2024    ALKPHOS 77 03/16/2024    PROT 7.9 06/26/2015    TP 7.9 03/16/2024    BILITOT 0.5 06/26/2015    TBILI 0.59 03/16/2024    EGFR 75 03/16/2024     Lab Results   Component Value Date    HGBA1C 5.2 03/04/2023     Lab Results   Component Value Date    YLT1TMQOEFFC 1.832 12/04/2021     Lab Results   Component Value Date    CHOLESTEROL 128 02/24/2024     Lab Results   Component Value Date    HDL 39 (L) 02/24/2024     Lab Results   Component Value Date    TRIG 146 02/24/2024     Lab Results   Component Value Date    LDLCALC 60 02/24/2024

## 2024-08-23 ENCOUNTER — OFFICE VISIT (OUTPATIENT)
Dept: GASTROENTEROLOGY | Facility: CLINIC | Age: 59
End: 2024-08-23
Payer: MEDICARE

## 2024-08-23 VITALS
TEMPERATURE: 99.5 F | DIASTOLIC BLOOD PRESSURE: 72 MMHG | WEIGHT: 315 LBS | HEIGHT: 63 IN | BODY MASS INDEX: 55.81 KG/M2 | SYSTOLIC BLOOD PRESSURE: 118 MMHG

## 2024-08-23 DIAGNOSIS — K86.9 LESION OF PANCREAS: Primary | ICD-10-CM

## 2024-08-23 PROCEDURE — 99213 OFFICE O/P EST LOW 20 MIN: CPT | Performed by: INTERNAL MEDICINE

## 2024-08-23 NOTE — PROGRESS NOTES
"[facility] - Outpatient Follow-up Note  Dante Keith 58 y.o. male MRN: [id number]  Encounter: [id number]          REASON FOR VISIT:  1. Lesion of pancreas  CT abdomen pelvis w contrast           ASSESSMENT AND PLAN:      1. Lesion of pancreas  Overview:  14 mm low-attenuation area in the pancreatic tail 301/66 on CT 3/2024.    Assessment & Plan:  He has concerns about laying still long enough for MRI.  Also concerns about \"waking up\" during sedation due to that happening during a prior EGD (UGIB, remote). We discussed general anesthesia (both for MRI and for potential EUS) and he would like to avoid invasive procedures if possible.  Will start with a pancreas protocol CT scan to see if we can get better characterization of lesion, and assess for change.  Discussed possibility of needing EUS/FNB depending on results.   Orders:  -     CT abdomen pelvis w contrast; Future; Expected date: 08/23/2024       The assessment and recommendations were discussed with the patient and/or family members, and they verbalized their understanding. All questions were answered to their satisfaction. They are in agreement with the recommendations. The recommendations were also communicated to the primary team/referring provider / are available for their review in the patient's chart.  A total of 20 minutes were spent for this consultation, including time spent directly with the patient and/or family, reviewing medical records and relevant lab/imaging findings, communication with patient's providers and coordination of care.    ______________________________________________________________________    PRIOR VISIT(S):  8/1/24 - Rosa Deshaun    SUBJECTIVE:  58 y.o. male with PMH significant for CHF, HTN, KYRIE and morbid obesity, who had incidental finding of pancreas lesion on CT scan in 3/2024.   Per chart:  Patient was evaluated in the ED in March for hematuria and had a contrasted CT scan which showed " hepatomegaly, hepatic steatosis, multiple sub-cm hepatic lesions (statistically benign) and a 14 mm low-density area in the pancreatic tail for which further evaluation with an MRI/MRCP was recommended. He states that he has not had a subsequent MRI because he is unable to lie flat for an extended period of time without intense muscle cramps and the need to urinate. His serologies showed a normal lipase, normal liver tests and preserved platelet count.     Denies a personal history of chronic liver disease or malignancy. Denies a known family history of liver, pancreatic or gallbladder cancers. He believes he had a grandfather with cirrhosis from alcohol use.  Denies a known past or current infection with viral hepatitis.     Denies tobacco use.  Rare EtOH use.  No family h/o pancreas cancer or disorders of which he is aware.    REVIEW OF SYSTEMS IS OTHERWISE NEGATIVE.      Historical Information   Past Medical History:   Diagnosis Date    Arthritis     CHF (congestive heart failure) (HCC)     Hx of cardiac cath     Hypertension     KYRIE treated with BiPAP      Past Surgical History:   Procedure Laterality Date    ABDOMINAL SURGERY      stomach cauterization post emesis    KNEE ARTHROSCOPY Right      Social History   Social History     Substance and Sexual Activity   Alcohol Use Not Currently     Social History     Substance and Sexual Activity   Drug Use No     Social History     Tobacco Use   Smoking Status Never   Smokeless Tobacco Never     History reviewed. No pertinent family history.    Meds/Allergies       Current Outpatient Medications:     aspirin 81 mg chewable tablet    atorvastatin (LIPITOR) 20 mg tablet    carvedilol (COREG) 12.5 mg tablet    diclofenac (VOLTAREN) 75 mg EC tablet    hydrALAZINE (APRESOLINE) 50 mg tablet    isosorbide mononitrate (IMDUR) 30 mg 24 hr tablet    Magnesium 500 MG TABS    metFORMIN (GLUCOPHAGE-XR) 500 mg 24 hr tablet    naproxen (NAPROSYN) 500 mg tablet    potassium chloride  "(Klor-Con M20) 20 mEq tablet    torsemide (DEMADEX) 20 mg tablet    clotrimazole-betamethasone (LOTRISONE) 1-0.05 % cream    metolazone (ZAROXOLYN) 5 mg tablet    tamsulosin (FLOMAX) 0.4 mg    Allergies   Allergen Reactions    Penicillins      Objective     Blood pressure 118/72, temperature 99.5 °F (37.5 °C), temperature source Tympanic, height 5' 3\" (1.6 m), weight (!) 184 kg (404 lb 9.6 oz). Body mass index is 71.67 kg/m².      PHYSICAL EXAM:      General Appearance:   Alert, cooperative, no distress, morbidly obese   HEENT:   Normocephalic, atraumatic, anicteric.     Neck:  Supple, symmetrical, trachea midline   Lungs:   respirations unlabored    Heart::   Regular rate    Abdomen:   non-distended   Genitalia:   Deferred    Rectal:   Deferred    Extremities:  No cyanosis, clubbing or edema    Pulses:  2+ and symmetric    Skin:  No jaundice, rashes, or lesions    Lymph nodes:  No palpable cervical lymphadenopathy        Lab Results:   No visits with results within 1 Day(s) from this visit.   Latest known visit with results is:   Admission on 03/16/2024, Discharged on 03/16/2024   Component Date Value    WBC 03/16/2024 11.30 (H)     RBC 03/16/2024 5.22     Hemoglobin 03/16/2024 15.3     Hematocrit 03/16/2024 47.5     MCV 03/16/2024 91     MCH 03/16/2024 29.3     MCHC 03/16/2024 32.2     RDW 03/16/2024 13.5     MPV 03/16/2024 10.5     Platelets 03/16/2024 242     nRBC 03/16/2024 0     Segmented % 03/16/2024 74     Immature Grans % 03/16/2024 1     Lymphocytes % 03/16/2024 13 (L)     Monocytes % 03/16/2024 8     Eosinophils Relative 03/16/2024 3     Basophils Relative 03/16/2024 1     Absolute Neutrophils 03/16/2024 8.37 (H)     Absolute Immature Grans 03/16/2024 0.14     Absolute Lymphocytes 03/16/2024 1.45     Absolute Monocytes 03/16/2024 0.86     Eosinophils Absolute 03/16/2024 0.36     Basophils Absolute 03/16/2024 0.12 (H)     Sodium 03/16/2024 140     Potassium 03/16/2024 3.7     Chloride 03/16/2024 105     " CO2 03/16/2024 27     ANION GAP 03/16/2024 8     BUN 03/16/2024 21     Creatinine 03/16/2024 1.08     Glucose 03/16/2024 117     Calcium 03/16/2024 9.5     AST 03/16/2024 27     ALT 03/16/2024 42     Alkaline Phosphatase 03/16/2024 77     Total Protein 03/16/2024 7.9     Albumin 03/16/2024 4.1     Total Bilirubin 03/16/2024 0.59     eGFR 03/16/2024 75     Lipase 03/16/2024 29     Color, UA 03/16/2024 Light [place]     Clarity, UA 03/16/2024 Turbid     Specific Gravity, UA 03/16/2024 1.014     pH, UA 03/16/2024 6.5     Leukocytes, UA 03/16/2024 Large (A)     Nitrite, UA 03/16/2024 Negative     Protein, UA 03/16/2024 300 (3+) (A)     Glucose, UA 03/16/2024 Negative     Ketones, UA 03/16/2024 Negative     Urobilinogen, UA 03/16/2024 <2.0     Bilirubin, UA 03/16/2024 Negative     Occult Blood, UA 03/16/2024 Large (A)     RBC, UA 03/16/2024 Innumerable (A)     WBC, UA 03/16/2024 Innumerable (A)     Epithelial Cells 03/16/2024 None Seen     Bacteria, UA 03/16/2024 Occasional     WBC Clumps 03/16/2024 Present     Urine Culture 03/16/2024 10,000-19,000 cfu/ml Proteus mirabilis (A)          Radiology Results:   CT 3/16/24:  LIVER/BILIARY TREE: Subcentimeter hypoattenuating lesion(s), too small to characterize but statistically likely benign, which do not require follow-up (ACR White Paper 2017). No suspicious mass. Normal hepatic contours. No biliary dilation.  Low-attenuation of the hepatic parenchyma reflecting steatosis.  Hepatomegaly.     GALLBLADDER: Cholelithiasis without findings of acute cholecystitis.     SPLEEN: Splenic calcification.     PANCREAS: 14 mm low-attenuation area in the pancreatic tail 301/66.     ADRENAL GLANDS: Unremarkable.     KIDNEYS/URETERS: No hydronephrosis or urinary tract calculi. Subcentimeter hypoattenuating renal lesion(s), too small to characterize but statistically likely benign, which do not warrant follow-up (Radiology June 2019).     STOMACH AND BOWEL: Unremarkable.     APPENDIX: No  findings to suggest appendicitis.     ABDOMINOPELVIC CAVITY: No ascites. No pneumoperitoneum. No lymphadenopathy.     VESSELS: Atherosclerosis without abdominal aortic aneurysm.     PELVIS     REPRODUCTIVE ORGANS: Unremarkable for patient's age.     URINARY BLADDER: Minimally distended. No gross abnormality.     ABDOMINAL WALL/INGUINAL REGIONS: Small fat-containing umbilical hernia.     BONES: No acute fracture or suspicious osseous lesion.  Lumbar spondylosis.  Degenerative disease of the hips and sacroiliac joints.     IMPRESSION:     1. Hepatomegaly and hepatic steatosis.  Cholelithiasis.     2. 14 mm low-density area in the pancreatic tail. Recommend evaluation with outpatient MRI abdomen with MRCP.  3. No hydronephrosis or nephrolithiasis.      I personally reviewed relevant labs and images and reports from relevant radiology studies.

## 2024-08-23 NOTE — ASSESSMENT & PLAN NOTE
"He has concerns about laying still long enough for MRI.  Also concerns about \"waking up\" during sedation due to that happening during a prior EGD (UGIB, remote). We discussed general anesthesia (both for MRI and for potential EUS) and he would like to avoid invasive procedures if possible.  Will start with a pancreas protocol CT scan to see if we can get better characterization of lesion, and assess for change.  Discussed possibility of needing EUS/FNB depending on results.   " independent

## 2024-08-30 DIAGNOSIS — R16.0 HEPATOMEGALY: Primary | ICD-10-CM

## 2024-08-30 DIAGNOSIS — K76.0 HEPATIC STEATOSIS: ICD-10-CM

## 2024-08-31 ENCOUNTER — APPOINTMENT (OUTPATIENT)
Dept: LAB | Facility: CLINIC | Age: 59
End: 2024-08-31
Payer: MEDICARE

## 2024-08-31 DIAGNOSIS — K76.0 HEPATIC STEATOSIS: ICD-10-CM

## 2024-08-31 DIAGNOSIS — Z11.59 ENCOUNTER FOR SCREENING FOR OTHER VIRAL DISEASES: ICD-10-CM

## 2024-08-31 DIAGNOSIS — R16.0 HEPATOMEGALY: ICD-10-CM

## 2024-08-31 LAB
ALBUMIN SERPL BCG-MCNC: 4 G/DL (ref 3.5–5)
ALP SERPL-CCNC: 65 U/L (ref 34–104)
ALT SERPL W P-5'-P-CCNC: 48 U/L (ref 7–52)
ANION GAP SERPL CALCULATED.3IONS-SCNC: 6 MMOL/L (ref 4–13)
AST SERPL W P-5'-P-CCNC: 37 U/L (ref 13–39)
BASOPHILS # BLD AUTO: 0.1 THOUSANDS/ÂΜL (ref 0–0.1)
BASOPHILS NFR BLD AUTO: 1 % (ref 0–1)
BILIRUB SERPL-MCNC: 0.58 MG/DL (ref 0.2–1)
BUN SERPL-MCNC: 26 MG/DL (ref 5–25)
CALCIUM SERPL-MCNC: 9.3 MG/DL (ref 8.4–10.2)
CHLORIDE SERPL-SCNC: 104 MMOL/L (ref 96–108)
CO2 SERPL-SCNC: 30 MMOL/L (ref 21–32)
CREAT SERPL-MCNC: 1.06 MG/DL (ref 0.6–1.3)
EOSINOPHIL # BLD AUTO: 0.36 THOUSAND/ÂΜL (ref 0–0.61)
EOSINOPHIL NFR BLD AUTO: 4 % (ref 0–6)
ERYTHROCYTE [DISTWIDTH] IN BLOOD BY AUTOMATED COUNT: 14.2 % (ref 11.6–15.1)
GFR SERPL CREATININE-BSD FRML MDRD: 76 ML/MIN/1.73SQ M
GLUCOSE P FAST SERPL-MCNC: 127 MG/DL (ref 65–99)
HAV AB SER QL IA: NORMAL
HBV CORE AB SER QL: NORMAL
HBV CORE IGM SER QL: NORMAL
HBV SURFACE AB SER-ACNC: <3 MIU/ML
HBV SURFACE AG SER QL: NORMAL
HCT VFR BLD AUTO: 44.1 % (ref 36.5–49.3)
HCV AB SER QL: NORMAL
HGB BLD-MCNC: 14.2 G/DL (ref 12–17)
IMM GRANULOCYTES # BLD AUTO: 0.14 THOUSAND/UL (ref 0–0.2)
IMM GRANULOCYTES NFR BLD AUTO: 2 % (ref 0–2)
INR PPP: 0.94 (ref 0.85–1.19)
LYMPHOCYTES # BLD AUTO: 1.44 THOUSANDS/ÂΜL (ref 0.6–4.47)
LYMPHOCYTES NFR BLD AUTO: 16 % (ref 14–44)
MCH RBC QN AUTO: 29.4 PG (ref 26.8–34.3)
MCHC RBC AUTO-ENTMCNC: 32.2 G/DL (ref 31.4–37.4)
MCV RBC AUTO: 91 FL (ref 82–98)
MONOCYTES # BLD AUTO: 0.78 THOUSAND/ÂΜL (ref 0.17–1.22)
MONOCYTES NFR BLD AUTO: 8 % (ref 4–12)
NEUTROPHILS # BLD AUTO: 6.46 THOUSANDS/ÂΜL (ref 1.85–7.62)
NEUTS SEG NFR BLD AUTO: 69 % (ref 43–75)
NRBC BLD AUTO-RTO: 0 /100 WBCS
PLATELET # BLD AUTO: 226 THOUSANDS/UL (ref 149–390)
PMV BLD AUTO: 10.7 FL (ref 8.9–12.7)
POTASSIUM SERPL-SCNC: 3.9 MMOL/L (ref 3.5–5.3)
PROT SERPL-MCNC: 7.7 G/DL (ref 6.4–8.4)
PROTHROMBIN TIME: 13.3 SECONDS (ref 12.3–15)
RBC # BLD AUTO: 4.83 MILLION/UL (ref 3.88–5.62)
SODIUM SERPL-SCNC: 140 MMOL/L (ref 135–147)
WBC # BLD AUTO: 9.28 THOUSAND/UL (ref 4.31–10.16)

## 2024-08-31 PROCEDURE — 85025 COMPLETE CBC W/AUTO DIFF WBC: CPT

## 2024-08-31 PROCEDURE — 82465 ASSAY BLD/SERUM CHOLESTEROL: CPT

## 2024-08-31 PROCEDURE — 84450 TRANSFERASE (AST) (SGOT): CPT

## 2024-08-31 PROCEDURE — 86705 HEP B CORE ANTIBODY IGM: CPT

## 2024-08-31 PROCEDURE — 82947 ASSAY GLUCOSE BLOOD QUANT: CPT

## 2024-08-31 PROCEDURE — 82104 ALPHA-1-ANTITRYPSIN PHENO: CPT

## 2024-08-31 PROCEDURE — 86803 HEPATITIS C AB TEST: CPT

## 2024-08-31 PROCEDURE — 87340 HEPATITIS B SURFACE AG IA: CPT

## 2024-08-31 PROCEDURE — 85610 PROTHROMBIN TIME: CPT

## 2024-08-31 PROCEDURE — 83883 ASSAY NEPHELOMETRY NOT SPEC: CPT

## 2024-08-31 PROCEDURE — 84478 ASSAY OF TRIGLYCERIDES: CPT

## 2024-08-31 PROCEDURE — 82103 ALPHA-1-ANTITRYPSIN TOTAL: CPT

## 2024-08-31 PROCEDURE — 86708 HEPATITIS A ANTIBODY: CPT

## 2024-08-31 PROCEDURE — 86704 HEP B CORE ANTIBODY TOTAL: CPT

## 2024-08-31 PROCEDURE — 80053 COMPREHEN METABOLIC PANEL: CPT

## 2024-08-31 PROCEDURE — 82977 ASSAY OF GGT: CPT

## 2024-08-31 PROCEDURE — 36415 COLL VENOUS BLD VENIPUNCTURE: CPT

## 2024-08-31 PROCEDURE — 82247 BILIRUBIN TOTAL: CPT

## 2024-08-31 PROCEDURE — 82172 ASSAY OF APOLIPOPROTEIN: CPT

## 2024-08-31 PROCEDURE — 86706 HEP B SURFACE ANTIBODY: CPT

## 2024-08-31 PROCEDURE — 83010 ASSAY OF HAPTOGLOBIN QUANT: CPT

## 2024-08-31 PROCEDURE — 84460 ALANINE AMINO (ALT) (SGPT): CPT

## 2024-09-04 LAB
A2 MACROGLOB SERPL-MCNC: 279 MG/DL (ref 110–276)
ALT SERPL W P-5'-P-CCNC: 58 IU/L (ref 0–55)
APO A-I SERPL-MCNC: 123 MG/DL (ref 101–178)
AST SERPL W P-5'-P-CCNC: 46 IU/L (ref 0–40)
BILIRUB SERPL-MCNC: 0.4 MG/DL (ref 0–1.2)
CHOLEST SERPL-MCNC: 140 MG/DL (ref 100–199)
FIBROSIS SCORING:: ABNORMAL
FIBROSIS STAGE SERPL QL: ABNORMAL
GGT SERPL-CCNC: 46 IU/L (ref 0–65)
GLUCOSE SERPL-MCNC: 130 MG/DL (ref 70–99)
HAPTOGLOB SERPL-MCNC: 256 MG/DL (ref 29–370)
INTERPRETATION: ABNORMAL
LABORATORY COMMENT REPORT: ABNORMAL
LIVER FIBR SCORE SERPL CALC.FIBROSURE: 0.39 (ref 0–0.21)
NASH GRADE SERPL QL: ABNORMAL
NASH SCORE SERPL: 0.8 (ref 0–0.25)
REF LAB TEST METHOD: ABNORMAL
SL AMB NASH SCORING: ABNORMAL
SL AMB STEATOSIS GRADE: ABNORMAL
SL AMB STEATOSIS SCORE: 0.67 (ref 0–0.4)
STEATOSIS SCORING: ABNORMAL
TEST PERFORMANCE INFO SPEC: ABNORMAL
TRIGL SERPL-MCNC: 182 MG/DL (ref 0–149)

## 2024-09-06 LAB
A1AT PHENOTYP SERPL IFE: NORMAL
A1AT SERPL-MCNC: 161 MG/DL (ref 101–187)

## 2024-09-09 ENCOUNTER — TELEPHONE (OUTPATIENT)
Dept: BARIATRICS | Facility: CLINIC | Age: 59
End: 2024-09-09

## 2024-09-09 DIAGNOSIS — E66.01 CLASS 3 SEVERE OBESITY DUE TO EXCESS CALORIES WITH SERIOUS COMORBIDITY AND BODY MASS INDEX (BMI) GREATER THAN OR EQUAL TO 70 IN ADULT (HCC): ICD-10-CM

## 2024-09-09 RX ORDER — METFORMIN HCL 500 MG
TABLET, EXTENDED RELEASE 24 HR ORAL
Qty: 180 TABLET | Refills: 3 | Status: SHIPPED | OUTPATIENT
Start: 2024-09-09

## 2024-09-09 NOTE — TELEPHONE ENCOUNTER
Received request from pharmacy to adjust Metformin 500 MG tablet prescription to a 90 day.  Please adjust if appropriate.

## 2024-09-10 ENCOUNTER — HOSPITAL ENCOUNTER (OUTPATIENT)
Dept: CT IMAGING | Facility: HOSPITAL | Age: 59
Discharge: HOME | End: 2024-09-10
Attending: INTERNAL MEDICINE
Payer: MEDICARE

## 2024-09-10 DIAGNOSIS — K86.9 LESION OF PANCREAS: ICD-10-CM

## 2024-09-10 PROCEDURE — 74177 CT ABD & PELVIS W/CONTRAST: CPT

## 2024-09-10 RX ADMIN — IOHEXOL 100 ML: 350 INJECTION, SOLUTION INTRAVENOUS at 15:41

## 2024-09-26 ENCOUNTER — TELEPHONE (OUTPATIENT)
Age: 59
End: 2024-09-26

## 2024-09-26 ENCOUNTER — PREP FOR PROCEDURE (OUTPATIENT)
Age: 59
End: 2024-09-26

## 2024-09-26 DIAGNOSIS — Z12.11 ENCOUNTER FOR SCREENING COLONOSCOPY: Primary | ICD-10-CM

## 2024-09-26 NOTE — LETTER
September 26, 2024         COLONOSCOPY  MIRALAX/Dulcolax Bowel Preparation Instructions    The OR/GI Lab will contact you the evening prior to your procedure with your exact arrival time.    Our practice requires a 1 week notice for any cancellations or rescheduling. We kindly ask that you immediately notify us of any changes including any new medications that are prescribed. Thank you for your cooperation.     WEEK BEFORE YOUR PROCEDURE:  Stop taking Iron tablets.  5 days prior, AVOID vegetables and fruits with skins or seeds, nuts, corn, popcorn and whole grain breads.   Purchase: One (1) 238-gram container of Miralax (polyethylene glycol 3350), four (4) 5 mg Dulcolax (bisacodyl) tablets, and one (1) 64-ounce bottle of Gatorade (sports drink) - no red, orange, or purple. These may be purchased at any pharmacy without a prescription. Generic products are permissible.   Arrange responsible transportation for day of the procedure.     DAY BEFORE THE PROCEDURE:   CLEAR liquids only for entire day prior. Nothing red, orange or purple.    You MAY have:                                                               Soda  Water  Broth Gatorade  Jello  Popsicles Coffee/tea without milk/creamer     YOU MAY NOT HAVE:  Solid foods   Milk and milk products    Juice with pulp    BOWEL PREPARATION:  Includes: One (1) 238-gram container of Miralax (polyethylene glycol 3350), four (4) 5 mg Dulcolax (bisacodyl) tablets, and one (1) 64-ounce bottle of Gatorade (sports drink).  Preparation may be refrigerated.  Entire bowel prep should be completed.     Afternoon before the procedure (2:00 pm - 5:00 pm):    Take two (2) 5 mg Dulcolax laxative tablets.     Evening before the procedure (6:00 pm):  Mix entire container of Miralax with one (1) 64-ounce bottle of Gatorade and shake until all medication is dissolved.   Begin drinking solution. Drink an eight (8) ounce cup every 10-15 minutes until you have consumed half (32 ounces) of the  solution.  Refrigerate remaining solution.    Night before the procedure (8:00 pm):  Take two (2) 5 mg Dulcolax laxative tablets.     Beginning 5 hours before your procedure:  Drink the remaining amount of prepared solution (32 ounces).  Drink an eight (8) ounce cup every 10-15 minutes until you have consumed the remaining solution.     Bowel prep should be completed 4 hours prior to procedure time.    NOTHING TO EAT OR DRINK AFTER MIDNIGHT- EXCEPT FOR YOUR PREP    DAY OF THE PROCEDURE:  You may brush your teeth.  Leave all jewelry at home.  Please arrive for your procedure as indicated by the OR / GI Lab / Endoscopy Unit. The hospital will contact you the day before with your exact arrival time.   Make sure you have arranged ahead of time for a responsible adult (18 or older) to accompany and drive you home after the procedure.  Please discuss any transportation concerns with our staff prior to your procedure.    The effects of the anesthesia can persist for 24 hours.  After receiving the sedation, you must exercise caution before engaging in any activity that could harm yourself and others (such as driving a car).  Do not make any important decisions or do not drink any alcoholic beverages during this time period.  After your procedure, you may have anything you'd like to eat or drink.  You will probably want to start with something light.  Please include plenty of fluids.  Avoid items that cause gas such as sodas and salads.    SPECIAL INSTRUCTIONS:    For patients currently taking blood thinners and/or antiplatelet therapy our office will contact the prescribing provider.  Our office will contact you with any required changes to your medication regimen.     Blood thinner (i.e. - Coumadin, Pradaxa, Lovenox, Xarelto, Eliquis)  ?  Continue (Do Not Stop)  ? Stop______________for_____________days prior to the procedure.    Antiplatelet (i.e. - Plavix, Aggrenox, Effient, Brilinta)  ?  Continue (Do Not  Stop)  ? Stop______________for_____________days prior to the procedure.       Diabetes:   If you are Diabetic, please see separate Diabetic Instruction Sheet.          Prescribed medications:  Do not stop your aspirin, or any of your other medications (unless instructed otherwise).    Take the rest of your prescribed medications with small sips of water at least 2 hours prior to your procedure.      For any questions or concerns related to your bowel preparation or pre-procedure instructions, please contact our office at [phone].  Thank you for choosing [facility]!    Medicine Instructions for Adults with Diabetes who Need a Bowel Prep       Follow these instructions when a BOWEL PREP is required for your procedure or surgery!    NOTE:   GLP-1 Agonists taken weekly: do not take in the 7 days before your procedure   SGLT-2 Inhibitors: do not take in the 4 days before your procedure     On the Day Before Surgery/Procedure  If you are having a procedure (e.g. Colonoscopy) or surgery that requires a bowel prep and you may have at least a clear liquid diet, follow the directions below based on the type of medicine you take for your diabetes.     Type of Medicine You Take Examples What to do   Pre-Mixed Insulin - Intermediate Acting Humalog® 75/25, Humulin® 70/30, Novolog® 70/30, Regular Insulin Take ½ your regular dose the evening before your procedure   Rapid/Fast Acting Insulin Humalog® U200, NovoLog®, Apidra®, Fiasp® Take ½ your regular dose the evening before your procedure.   Long-Acting Insulin Lantus®, Levemir®, Tresiba®, Toujeo®, Basaglar® Take your FULL regular dose the day before procedure   Oral Sulfonylurea Glipizide/Glimepiride/Glucotrol® Take ½ your regular dose the evening before your procedure   Other Oral Diabetes Medicines Metformin®, Glucophage®, Glucophage XR®, Riomet®, Glumetza®), Actose®, Avandia®, Glyset®, Prandin® Take your regular dose with dinner in the evening before your  procedure   GLP-1 Agonists AdlyxinÒ, ByettaÒ, BydureonÒ, OzempicÒ, SoliquaÒ, TanzeumÒ, TrulicityÒ, VictozaÒ, Saxenda®, Rybelsus® If taken daily, take as normal    If taken weekly, do not take this medicine for 7 days before your procedure including the day of the procedure (resume taking after the procedure)   SGLT-2 Inhibitors Jardiance®, Invokana®, Farxiga®,   Steglatro®, Brenzavvy®, Qtern®, Segluromet®, Glyxambi®, Synjardy®, Synjardy XR®, Invokamet®, Invokamet XR®, Trijary XR®, Xigduo XR®, Steglujan® Do not take for 4 days before your procedure including the day of the procedure (resume taking after the procedure)                More information continued on back                    Medicine Instructions for Adults with Diabetes who Need a Bowel Prep  Page 2      On the Day of Surgery/Procedure  Follow the directions below based on the type of medicine you take for your diabetes.     Type of Medicine You Take Examples What to do   Long-Acting Insulin Lantus®, Levemir®, Tresiba®, Toujeo®, Basaglar®, Semglee®   If you usually take your Long-Acting Insulin in the morning, take the full dose as scheduled.   GLP-1 Agonists AdlyxinÒ, ByettaÒ, BydureonÒ, OzempicÒ, SoliquaÒ, TanzeumÒ, TrulicityÒ, VictozaÒ, Saxenda®, Rybelsus® Do NOT take this medicine on the day of your procedure (resume taking after the procedure)       On the Day of Surgery/Procedure (continued)  Except for the morning Long-Acting Insulin, DO NOT take ANY diabetic medicine on the day of your procedure unless you were instructed by the doctor who manages your diabetes medicines.    Continue to check your blood sugars.  If you have an insulin pump, ask your endocrinologist for instructions at least 3 days before your procedure. NOTE: If you are not able to ask your endocrinologist in advance, on the day of the procedure set your insulin pump to your basal rate only. Bring your insulin pump supplies to the hospital.     If you have any questions about  taking your diabetes medicines prior to your procedure, please contact the doctor who manages your diabetes medicines.

## 2024-09-26 NOTE — TELEPHONE ENCOUNTER
Scheduled date of colonoscopy (as of today): 10/1/24  Physician performing colonoscopy: ROBERT  Location of colonoscopy:  GI LAB  Bowel prep reviewed with patient: liya/luther  Instructions to MYC     Pt is on Diabetic meds- MetFormin    09/26/24  Screened by: Dina Naylor MA    Referring Provider     Pre- Screening:     There is no height or weight on file to calculate BMI. 71.67  Has patient been referred for a routine screening Colonoscopy? yes  Is the patient between 45-75 years old? yes      Previous Colonoscopy yes   If yes:    Date: many years ago    Facility: does not remember     Reason:       Does the patient want to see a Gastroenterologist prior to their procedure OR are they having any GI symptoms? no    Has the patient been hospitalized or had abdominal surgery in the past 6 months? no    Does the patient use supplemental oxygen? no    Does the patient take Coumadin, Lovenox, Plavix, Elliquis, Xarelto, or other blood thinning medication? no    Has the patient had a stroke, cardiac event, or stent placed in the past year? no      If patient is between 45yrs - 49yrs, please advise patient that we will have to confirm benefits & coverage with their insurance company for a routine screening colonoscopy.

## 2024-09-27 ENCOUNTER — TELEPHONE (OUTPATIENT)
Age: 59
End: 2024-09-27

## 2024-09-27 NOTE — TELEPHONE ENCOUNTER
LVM for patient explaining results and recommendations listed below.    You liver labs are stable. Your workup was negative for viral hepatitis and other genetic causes of liver disease. Your BUTLER Fibrosure shows little-moderate liver stiffness/scarring from having fatty liver disease (F1-F2 fibrosis). Lastly, it appears that you are not immune to hepatitis A or B and it is recommended that you complete the vaccination series for both either through your PCP or local pharmacy. We can discuss these results in greater detail during your next appointment.

## 2024-10-01 ENCOUNTER — ANESTHESIA (OUTPATIENT)
Dept: GASTROENTEROLOGY | Facility: HOSPITAL | Age: 59
End: 2024-10-01
Payer: MEDICARE

## 2024-10-01 ENCOUNTER — ANESTHESIA EVENT (OUTPATIENT)
Dept: GASTROENTEROLOGY | Facility: HOSPITAL | Age: 59
End: 2024-10-01
Payer: MEDICARE

## 2024-10-01 ENCOUNTER — HOSPITAL ENCOUNTER (OUTPATIENT)
Dept: GASTROENTEROLOGY | Facility: HOSPITAL | Age: 59
Setting detail: OUTPATIENT SURGERY
Discharge: HOME | End: 2024-10-01
Attending: SURGERY
Payer: MEDICARE

## 2024-10-01 VITALS
RESPIRATION RATE: 16 BRPM | SYSTOLIC BLOOD PRESSURE: 150 MMHG | HEART RATE: 77 BPM | OXYGEN SATURATION: 98 % | DIASTOLIC BLOOD PRESSURE: 79 MMHG | TEMPERATURE: 98.7 F | HEIGHT: 63 IN | BODY MASS INDEX: 55.81 KG/M2 | WEIGHT: 315 LBS

## 2024-10-01 DIAGNOSIS — Z12.11 ENCOUNTER FOR SCREENING COLONOSCOPY: ICD-10-CM

## 2024-10-01 LAB — GLUCOSE SERPL-MCNC: 99 MG/DL (ref 65–140)

## 2024-10-01 PROCEDURE — 82948 REAGENT STRIP/BLOOD GLUCOSE: CPT

## 2024-10-01 PROCEDURE — G0121 COLON CA SCRN NOT HI RSK IND: HCPCS | Performed by: SURGERY

## 2024-10-01 RX ORDER — PROPOFOL 10 MG/ML
INJECTION, EMULSION INTRAVENOUS CONTINUOUS PRN
Status: DISCONTINUED | OUTPATIENT
Start: 2024-10-01 | End: 2024-10-01

## 2024-10-01 RX ORDER — LIDOCAINE HYDROCHLORIDE 10 MG/ML
INJECTION, SOLUTION EPIDURAL; INFILTRATION; INTRACAUDAL; PERINEURAL AS NEEDED
Status: DISCONTINUED | OUTPATIENT
Start: 2024-10-01 | End: 2024-10-01

## 2024-10-01 RX ORDER — PROPOFOL 10 MG/ML
INJECTION, EMULSION INTRAVENOUS AS NEEDED
Status: DISCONTINUED | OUTPATIENT
Start: 2024-10-01 | End: 2024-10-01

## 2024-10-01 RX ORDER — SODIUM CHLORIDE, SODIUM LACTATE, POTASSIUM CHLORIDE, CALCIUM CHLORIDE 600; 310; 30; 20 MG/100ML; MG/100ML; MG/100ML; MG/100ML
INJECTION, SOLUTION INTRAVENOUS CONTINUOUS PRN
Status: DISCONTINUED | OUTPATIENT
Start: 2024-10-01 | End: 2024-10-01

## 2024-10-01 RX ADMIN — PROPOFOL 60 MG: 10 INJECTION, EMULSION INTRAVENOUS at 07:48

## 2024-10-01 RX ADMIN — SODIUM CHLORIDE, SODIUM LACTATE, POTASSIUM CHLORIDE, AND CALCIUM CHLORIDE: .6; .31; .03; .02 INJECTION, SOLUTION INTRAVENOUS at 07:45

## 2024-10-01 RX ADMIN — PROPOFOL 30 MG: 10 INJECTION, EMULSION INTRAVENOUS at 07:50

## 2024-10-01 RX ADMIN — LIDOCAINE HYDROCHLORIDE 50 MG: 10 INJECTION, SOLUTION EPIDURAL; INFILTRATION; INTRACAUDAL at 07:48

## 2024-10-01 RX ADMIN — PROPOFOL 120 MCG/KG/MIN: 10 INJECTION, EMULSION INTRAVENOUS at 07:48

## 2024-10-01 NOTE — ANESTHESIA POSTPROCEDURE EVALUATION
Post-Op Assessment Note    CV Status:  Stable    Pain management: adequate       Mental Status:  Alert and awake   Hydration Status:  Euvolemic   PONV Controlled:  Controlled   Airway Patency:  Patent     Post Op Vitals Reviewed: Yes    No anethesia notable event occurred.    Staff: ADALBERTO               /72 (10/01/24 0800)    Temp 98.7 °F (37.1 °C) (10/01/24 0800) 99   Pulse 86 (10/01/24 0800)   Resp 16 (10/01/24 0800)    SpO2 96 % (10/01/24 0800)

## 2024-10-01 NOTE — ANESTHESIA PREPROCEDURE EVALUATION
Procedure:  COLONOSCOPY    Relevant Problems   CARDIO   (+) Acute on chronic combined systolic and diastolic congestive heart failure (HCC)   (+) Essential hypertension      GI/HEPATIC   (+) Lesion of pancreas      /RENAL   (+) VIRA (acute kidney injury) (HCC)      PULMONARY   (+) KYRIE (obstructive sleep apnea)      1/11/23:    Left Ventricle: Left ventricular cavity size is normal. Wall thickness is mildly increased. There is eccentric hypertrophy. Systolic function is normal. Although no diagnostic regional wall motion abnormality was identified, this possibility cannot be completely excluded on the basis of this study. Diastolic function is mildly abnormal, consistent with grade I (abnormal) relaxation.    Right Ventricle: Right ventricle is not well visualized. Right ventricular cavity size is mildly dilated. Systolic function is normal.    Left Atrium: The atrium is mildly dilated.    Right Atrium: The atrium is mildly dilated.     Technically difficult study  Physical Exam    Airway    Mallampati score: II  TM Distance: >3 FB  Neck ROM: full     Dental       Cardiovascular      Pulmonary      Other Findings        Anesthesia Plan  ASA Score- 4     Anesthesia Type- IV sedation with anesthesia with ASA Monitors.         Additional Monitors:     Airway Plan:            Plan Factors-Exercise tolerance (METS): <4 METS.    Chart reviewed.    Patient summary reviewed.    Patient is not a current smoker.  Patient did not smoke on day of surgery.            Induction- intravenous.    Postoperative Plan-     Perioperative Resuscitation Plan - Level 1 - Full Code.       Informed Consent- Anesthetic plan and risks discussed with patient.  I personally reviewed this patient with the CRNA. Discussed and agreed on the Anesthesia Plan with the CRNA..

## 2024-10-01 NOTE — H&P
History and Physical   Colon and Rectal Surgery   Dante Keith 58 y.o. male MRN: [id number]  Unit/Bed#:  Encounter: [id number]  10/01/24   @NOW    No chief complaint on file.        History of Present Illness   HPI:  Dante Keith is a 58 y.o. male who presents for colonoscopy.      Historical Information   Past Medical History:   Diagnosis Date    Arthritis     CHF (congestive heart failure) (HCC)     Hx of cardiac cath     Hypertension     KYRIE treated with BiPAP      Past Surgical History:   Procedure Laterality Date    ABDOMINAL SURGERY      stomach cauterization post emesis    KNEE ARTHROSCOPY Right        Meds/Allergies     Not in a hospital admission.      Current Outpatient Medications:     aspirin 81 mg chewable tablet, Chew 1 tablet daily, Disp: , Rfl: 0    atorvastatin (LIPITOR) 20 mg tablet, Take 1 tablet (20 mg total) by mouth daily, Disp: 90 tablet, Rfl: 1    carvedilol (COREG) 12.5 mg tablet, TAKE 1 TABLET (12.5 MG TOTAL) BY MOUTH 2 (TWO) TIMES A DAY, Disp: 180 tablet, Rfl: 3    clotrimazole-betamethasone (LOTRISONE) 1-0.05 % cream, Apply topically 2 (two) times a day (Patient not taking: Reported on 8/16/2024), Disp: , Rfl:     diclofenac (VOLTAREN) 75 mg EC tablet, prn, Disp: , Rfl: 1    hydrALAZINE (APRESOLINE) 50 mg tablet, Take 1 tablet (50 mg total) by mouth 3 (three) times a day, Disp: 270 tablet, Rfl: 3    isosorbide mononitrate (IMDUR) 30 mg 24 hr tablet, Take 1 tablet (30 mg total) by mouth daily, Disp: 90 tablet, Rfl: 0    Magnesium 500 MG TABS, Take 1 capsule by mouth daily , Disp: , Rfl:     metFORMIN (GLUCOPHAGE-XR) 500 mg 24 hr tablet, Start with 500 mg orally once daily with the evening meal increase in two weeks to 1000 mg (two tablets) if tolerated, Disp: 180 tablet, Rfl: 3    metolazone (ZAROXOLYN) 5 mg tablet, TAKE 1 TABLET (5 MG TOTAL) BY MOUTH IF NEEDED (FLUID WEIGHT GAIN OF 4 LBS) (Patient not taking: Reported on 8/16/2024), Disp: 90 tablet, Rfl: 1    naproxen (NAPROSYN)  "500 mg tablet, Take 1 tablet (500 mg total) by mouth 2 (two) times a day with meals for 7 days, Disp: 14 tablet, Rfl: 0    potassium chloride (Klor-Con M20) 20 mEq tablet, Take 2 tablets (40 mEq total) by mouth daily, Disp: 180 tablet, Rfl: 3    torsemide (DEMADEX) 20 mg tablet, TAKE 2 TABLETS BY MOUTH 2 TIMES A DAY., Disp: 360 tablet, Rfl: 1    Allergies   Allergen Reactions    Penicillins          Social History   Social History     Substance and Sexual Activity   Alcohol Use Not Currently     Social History     Substance and Sexual Activity   Drug Use No     Social History     Tobacco Use   Smoking Status Never   Smokeless Tobacco Never         Family History: History reviewed. No pertinent family history.      Objective     Current Vitals:   Blood Pressure: (!) 171/73 (10/01/24 0657)  Pulse: 80 (10/01/24 0657)  Temperature: 99 °F (37.2 °C) (10/01/24 0657)  Temp Source: Temporal (10/01/24 0657)  Respirations: 16 (10/01/24 0657)  Height: 5' 3\" (160 cm) (10/01/24 0657)  Weight - Scale: (!) 183 kg (403 lb 6.4 oz) (10/01/24 0657)  SpO2: 99 % (10/01/24 0657)  No intake or output data in the 24 hours ending 10/01/24 0741    Physical Exam:  General:  Resting comfortably in bed   Eyes:Sclera anicteric  ENT: Trachea midline  Pulm:  Symmetric chest raise.  No respiratory Distress  CV:  Regular on monitor  Abdomen:  Soft NT ND  Extremities:  No clubbing/ cyanosis/ edema    Lab Results: I have personally reviewed pertinent lab results.    Imaging: No pertinent imaging studies reviewed.      ASSESSMENT:  Dante Keith is a 58 y.o. male who presents for outpatient colonoscopy.      PLAN:  For colonoscopy    Risks/ Benefits reviewed to include but not limited to anesthesia, bleeding, missed lesions, and colonoscopic perforation requiring surgery.        "

## 2024-11-04 ENCOUNTER — OFFICE VISIT (OUTPATIENT)
Dept: GASTROENTEROLOGY | Facility: AMBULARY SURGERY CENTER | Age: 59
End: 2024-11-04
Payer: MEDICARE

## 2024-11-04 VITALS
WEIGHT: 315 LBS | BODY MASS INDEX: 55.81 KG/M2 | OXYGEN SATURATION: 96 % | HEART RATE: 79 BPM | DIASTOLIC BLOOD PRESSURE: 80 MMHG | HEIGHT: 63 IN | SYSTOLIC BLOOD PRESSURE: 148 MMHG

## 2024-11-04 DIAGNOSIS — K86.9 PANCREATIC LESION: ICD-10-CM

## 2024-11-04 DIAGNOSIS — K76.0 HEPATIC STEATOSIS: Primary | ICD-10-CM

## 2024-11-04 DIAGNOSIS — Z12.11 SCREENING FOR COLON CANCER: ICD-10-CM

## 2024-11-04 DIAGNOSIS — R16.0 HEPATOMEGALY: ICD-10-CM

## 2024-11-04 PROCEDURE — 99213 OFFICE O/P EST LOW 20 MIN: CPT | Performed by: FAMILY MEDICINE

## 2024-11-04 NOTE — PROGRESS NOTES
[facility] - Outpatient Follow-up Note  Dante Keith 58 y.o. male MRN: [id number]  Encounter: [id number]          ASSESSMENT AND PLAN:      1. Hepatic steatosis  2. Hepatomegaly  Patient was incidentally found to have hepatomegaly and hepatic steatosis on cross-sectional imaging for the evaluation of hematuria. Since his last appointment, he had a focused serologic evaluation which was negative for viral hepatitis and within normal A1AT phenotype. His serologies also showed normal liver chemistries, normal liver synthetic function and preserved platelet count. He had a US elastography which was nondiagnostic as well as a BUTLER FibroSure which demonstrated F1-F2 fibrosis.      He has been seen by weight management and started on metformin with an approximate 10 lb weight loss. It was agreed that a GLP-1a would be beneficial to see any meaningful weight loss but there is concern regarding insurance coverage. Encouraged both he and his wife to call his primary and secondary insurance to determine coverage for GLP-1a therapies and bring this information with them to their next weight management appointment. They both gave verbal understanding.    Otherwise, he will continue with efforts towards steady and sustainable weight loss, optimization of his metabolic risk factors and limiting his EtOH use.  Will defer additional testing to stage his fibrosis including and MR elastography, given his inability to lie flat for MRI, and liver biopsy, given lack of evidence suggesting advanced liver disease.      Will plan for a repeat BUTLER FibroSure and updated liver function tests in August just prior to his next follow-up appointment. If he has had significant weight loss then we can reattempt a US elastography to more accurately stage his fibrosis.    - BUTLER Fibrosure; Future  - CBC and differential; Future  - Comprehensive metabolic panel; Future  - Protime-INR; Future    3.  Pancreatic lesion  Patient was incidentally noted to have a 14 mm low-density area in the pancreatic tail on cross-sectional imaging for the evaluation of hematuria. No family history of pancreatic cancer or alarm features.  He was unable to have an MRI/MRCP due to his inability to lie flat for long periods of time.  He also prefer to avoid invasive procedures, such as an EUS, due to prior unfavorable experiences with anesthesia.    Since his last appointment, he was seen by advanced endoscopy, Dr. Delia Perez, and ordered for a CT pancreatic protocol which showed mild fatty replacement of the head and use an 8 process as well as a cleft of fat within the pancreas corresponding to the lesion seen on prior CT scan. No pancreatic masses were identified.    No additional workup or surveillance imaging was recommended.  Reiterated recommendations for the treatment of fatty liver disease to also aid in reducing fatty deposits in the pancreas.    4. Screening for colon cancer  Patient is up-to-date with CRC screening.  Colonoscopy (10/1/2024) with multiple internal, small flat hemorrhoids but otherwise normal.  Repeat x10 years; Next due 10/2034.      Follow-up in August 2025 or sooner if necessary.     ______________________________________________________________________    SUBJECTIVE: Patient is a 58 y.o. male with PMH significant for CHF, HTN, KYRIE and obesity who presents today for follow-up regarding fatty liver disease and pancreatic lesion.    Interval history  - He had a focused serologic evaluation which was negative for viral hepatitis and with a normal A1AT phenotype. Also with normal liver chemistries, normal liver synthetic function and a preserved platelet count. He lacks immunity to hepatitis A and B.  - US elastography was nondiagnostic but BUTLER FibroSure with F1-F2 fibrosis.  - Seen by Dr. Delia Perez for further evaluation of a pancreatic lesion and had a CT pancreatic protocol notable for mild  fatty replacement of the head and uncinate process but no pancreatic masses were observed. The previously seen lesion described on his prior CT scan corresponds to a cleft of fat within the pancreas.  - Colonoscopy (10/1/2024) with multiple internal, small and flat hemorrhoids but otherwise normal. Repeat x10 years.     Extended liver history  Patient was evaluated in the ED in March for hematuria and had a contrasted CT scan which showed hepatomegaly, hepatic steatosis, multiple sub-cm hepatic lesions (statistically benign) and a 14 mm low-density area in the pancreatic tail for which further evaluation with an MRI/MRCP was recommended. He states that he has not had a subsequent MRI because he is unable to lie flat for an extended period of time without intense muscle cramps and the need to urinate. His serologies showed a normal lipase, normal liver tests and preserved platelet count.     Denies a personal history of chronic liver disease or malignancy. Denies a known family history of liver, pancreatic or gallbladder cancers. He believes he had a grandfather with cirrhosis from alcohol use.  Denies a known past or current infection with viral hepatitis. Denies EtOH use.     Denies fever/chills, nausea/vomiting, dark-colored urine, acholic stools, abdominal pain, yellowing of his eyes and/or skin or unintentional weight loss. He is not diabetic.     Of note, he does have a history of HSP.      REVIEW OF SYSTEMS IS OTHERWISE NEGATIVE.      Historical Information   Past Medical History:   Diagnosis Date    Arthritis     CHF (congestive heart failure) (HCC)     Hx of cardiac cath     Hypertension     KYRIE treated with BiPAP      Past Surgical History:   Procedure Laterality Date    ABDOMINAL SURGERY      stomach cauterization post emesis    KNEE ARTHROSCOPY Right      Social History   Social History     Substance and Sexual Activity   Alcohol Use Not Currently     Social History     Substance and Sexual Activity    Drug Use No     Social History     Tobacco Use   Smoking Status Never   Smokeless Tobacco Never     History reviewed. No pertinent family history.    Meds/Allergies       Current Outpatient Medications:     aspirin 81 mg chewable tablet    atorvastatin (LIPITOR) 20 mg tablet    carvedilol (COREG) 12.5 mg tablet    diclofenac (VOLTAREN) 75 mg EC tablet    hydrALAZINE (APRESOLINE) 50 mg tablet    isosorbide mononitrate (IMDUR) 30 mg 24 hr tablet    Magnesium 500 MG TABS    metFORMIN (GLUCOPHAGE-XR) 500 mg 24 hr tablet    metolazone (ZAROXOLYN) 5 mg tablet    potassium chloride (Klor-Con M20) 20 mEq tablet    torsemide (DEMADEX) 20 mg tablet    clotrimazole-betamethasone (LOTRISONE) 1-0.05 % cream    naproxen (NAPROSYN) 500 mg tablet    Allergies   Allergen Reactions    Penicillins            Objective     There were no vitals taken for this visit. There is no height or weight on file to calculate BMI.      PHYSICAL EXAM:      General Appearance:   Alert, cooperative, no distress   HEENT:   Normocephalic, atraumatic, anicteric.     Neck:  Supple, symmetrical, trachea midline   Lungs:   Clear to auscultation bilaterally; no rales, rhonchi or wheezing; respirations unlabored    Heart::   Regular rate and rhythm; no murmur, rub, or gallop.   Abdomen:   Soft, non-tender, non-distended; normal bowel sounds; no masses, no organomegaly    Genitalia:   Deferred    Rectal:   Deferred    Extremities:  No cyanosis, clubbing or edema    Pulses:  2+ and symmetric    Skin:  No jaundice, rashes, or lesions    Lymph nodes:  No palpable cervical lymphadenopathy        Lab Results:   No visits with results within 1 Day(s) from this visit.   Latest known visit with results is:   Hospital Outpatient Visit on 10/01/2024   Component Date Value    POC Glucose 10/01/2024 99          Radiology Results:   No results found.    Rosa Meade PA-C     **Please note: Dictation voice to text software may have been used in the creation of this  record. Occasional wrong word or “sound alike” substitutions may have occurred due to the inherent limitations of voice recognition software. Read the chart carefully and recognize, using context, where substitutions have occurred.**

## 2024-11-24 NOTE — PROGRESS NOTES
Program: Conservative Program    Assessment/Plan     Dante Keith  is a 59 y.o. male with  returns to follow up  for treatment of excess body weight and associated risk factors/co-morbidities.     Class 3 severe obesity due to excess calories with serious comorbidity and body mass index (BMI) greater than or equal to 70 in adult (HCC)  Patient reports that he cut down his portion sizes to a quarter of what he was eating before and he was able to lose about 8 pounds.  When it slowed down he became discouraged.  I encouraged him to continue focusing on portion sizes and making healthier choices.  He tried the resistance bands as well and stopped.  I discussed continuing to have structure in his day with physical activity and not link it to results but just for mental and emotional health.  KYRIE on BiPAP.  He will need to have his settings reevaluated by sleep medicine.    Given class III severe obesity with BMI of 72.88, explained to the patient limitations of available medical options.  Patient would like to attempt injectable medication so prescription for Wegovy has been submitted.  Patient still continues to have uncontrolled hypertension so phentermine would not be an option.  He was started on metformin last office visit and he reports that he does not retain as much fluid in his legs after starting the medication.  Due to limited oral medication options we decided to titrate up metformin to 750 mg twice a day.  In addition we will add low-dose Topamax.    Patient was once again presented with the option of bariatric surgery.  He states that he is worried about taking his heart pills while on a liquid diet.  He reports he will feel sick to his stomach and his hands start shaking.  He is not open to this option.       Most recent notes and records were reviewed.         Return visit:  2-3 months     Nutrition   Do not skip meals. Avoid sugary beverages. At least 64oz of water daily.    Behavioral/Stress  "  Food log via luiz or provided paper log (luiz options include www.[employer]th[employer].com, sparkpeople.com, loseit.com, calorieking.com, [employer]). Encouraged mindful eating    Physical Activity  Increase physical activity by 10 minutes daily. Gradually increase physical activity to a goal of 5 days per week for 30 minutes of MODERATE intensity ( ( should be able to pass the \"talk test\" but should not be able to sing.  target 150-300 minutes  PLUS 2-3 days per week of FULL BODY resistance training. Progression will be addressed at follow up visits. Encouraged planning regarding establishing physical activity routine    Sleep  Provided sleep hygiene counseling and importance of having adequate sleep duration          Dante was seen today for follow-up.    Diagnoses and all orders for this visit:    Class 3 severe obesity due to excess calories with serious comorbidity and body mass index (BMI) greater than or equal to 70 in adult (HCC)  -     Semaglutide-Weight Management (WEGOVY) 0.25 MG/0.5ML; Inject 0.5 mL (0.25 mg total) under the skin once a week  -     metFORMIN (GLUCOPHAGE-XR) 750 mg 24 hr tablet; Take 1 tablet by mouth twice a day with meals  -     topiramate (Topamax) 25 mg tablet; Start with 1 tablet 20 minutes before evening meal and after 1 week can increase to 1 tablet twice a day        Topiramate Instructions:  -Patient was counseled that the medication is associated with cleft palate if used during the first trimester of pregnancy and therefore was instructed to use birth control during the period of her use of this medication.  -May reduce the effectiveness of hormonal birth control - backup method such as condoms recommended to avoid pregnancy.  -Upon discontinuation tapering dose over using every other day dosing is recommended.  - patient to maintain adequate fluid intake to minimize risk of kidney stones  Advise patient against sudden discontinuation, as this may cause increased seizure " activity  Instruct female patient to use an additional form of contraception due to decreased effectiveness of estrogen-containing or progestin-only contraceptives  - report symptoms of acute myopia, sudden ocular pain, blurred vision, or decreased visual acuity.  -Advise patient to report new or worsening depression, suicidal thoughts or behavior, or unusual changes in mood or behavior    Reviewed the potential side effects of topiramate (Topamax) which may include - numbness or tingling, difficulty with word finding, metabolic acidosis, occurrence of gallstones and kidney stones, glaucoma, fatigue, worsening depression/anxiety, changes in taste, abdominal upset/heartburn, and trouble sleeping. Side effects may include anorexia, taste perversion, fatigue, paresthesia, psychomotor slowing, memory or concentration difficulties, cognitive dysfunction, mood problems, and flushing.         Total time spent reviewing chart, interviewing patient, examining patient, discussing plan, answering all questions, and documentin minutes with >50% face-to-face time with the patient.            Weight trajectory     Wt Readings from Last 20 Encounters:   24 (!) 183 kg (403 lb 6.4 oz)   24 (!) 184 kg (404 lb 9.6 oz)   10/01/24 (!) 183 kg (403 lb 6.4 oz)   24 (!) 183 kg (404 lb)   24 (!) 184 kg (404 lb 9.6 oz)   24 (!) 187 kg (411 lb 6.4 oz)   24 (!) 186 kg (410 lb 6.4 oz)   24 (!) 189 kg (416 lb)   24 (!) 183 kg (403 lb)   23 (!) 182 kg (401 lb)   23 (!) 173 kg (381 lb 6.4 oz)   23 (!) 171 kg (378 lb)   23 (!) 176 kg (388 lb)   10/13/22 (!) 176 kg (388 lb 11.2 oz)   22 (!) 182 kg (401 lb 9.6 oz)   22 (!) 182 kg (402 lb)   21 (!) 182 kg (402 lb)   21 (!) 182 kg (402 lb 1.6 oz)   20 (!) 182 kg (401 lb 6.4 oz)   20 (!) 181 kg (398 lb)               Lifestyle questionnaire       Diet recall:  B: Leftovers  S: cottage cheese  "doubles lunch meat   L: skips   S: 2 pm ravioli lunch meat   D: P/V/S  S: Once in a while chips and cookies but not very often  Eating out vs cooking at home- once a week    Beverages  Water-- zero     Caffeine/tea--   diet green tea, 16 oz x 4  SSB- diet coke 24 oz    Alcohol: no  Smoking: no  Drug use: no    Physical Activity --arthritis    Sleep -- STOP- BANG- ;Bi PAP same machine 9 years ago hours nap a lot during the     Occupation-disabled since age 48     Psycho social- with wife            Start date: 8/16/2024  Intial weight : 411 lbs  Intial BMI: 72.88 kg/m2  Obesity Class: Above 40- Obesity Class III  Goal weight: 280  lbs    Weight on 11/26/24 : 403 lbs  BMI on 11/26/24: 71.46 kg/m2 Above 40- Obesity Class III  Difference: -8 lbs      Colonoscopy: Needs an order            Anti obesity Medications/ Medical---    Weight loss medication and dose: Metformin  Date initiated: August 2024              Objective         The following portions of the patient's history were reviewed and updated as appropriate: allergies, current medications, past family history, past medical history, past social history, past surgical history, and problem list.      /82 (BP Location: Left arm, Patient Position: Sitting, Cuff Size: Adult)   Pulse 86   Resp 18   Ht 5' 3\" (1.6 m)   Wt (!) 183 kg (403 lb 6.4 oz)   SpO2 96%   BMI 71.46 kg/m²             Review of Systems   Constitutional:  Negative for fatigue.   HENT:  Negative for sore throat.    Respiratory:  Negative for cough and shortness of breath.    Cardiovascular:  Negative for chest pain, palpitations and leg swelling.   Gastrointestinal:  Negative for abdominal pain, constipation, diarrhea and nausea.   Genitourinary:  Negative for dysuria.   Musculoskeletal:  Negative for arthralgias and back pain.   Skin:  Negative for rash.   Neurological:  Negative for headaches.   Psychiatric/Behavioral:  Negative for dysphoric mood. The patient is not nervous/anxious.     "      Physical Exam  Vitals and nursing note reviewed.   Constitutional:       Appearance: Normal appearance.   HENT:      Head: Normocephalic.   Pulmonary:      Effort: Pulmonary effort is normal.   Neurological:      General: No focal deficit present.      Mental Status: She is alert and oriented to person, place, and time.   Psychiatric:         Mood and Affect: Mood normal.         Behavior: Behavior normal.         Thought Content: Thought content normal.         Judgment: Judgment normal.          Current medications       Current Outpatient Medications:     aspirin 81 mg chewable tablet, Chew 1 tablet daily, Disp: , Rfl: 0    atorvastatin (LIPITOR) 20 mg tablet, Take 1 tablet (20 mg total) by mouth daily, Disp: 90 tablet, Rfl: 1    carvedilol (COREG) 12.5 mg tablet, TAKE 1 TABLET (12.5 MG TOTAL) BY MOUTH 2 (TWO) TIMES A DAY, Disp: 180 tablet, Rfl: 3    diclofenac (VOLTAREN) 75 mg EC tablet, prn, Disp: , Rfl: 1    hydrALAZINE (APRESOLINE) 50 mg tablet, Take 1 tablet (50 mg total) by mouth 3 (three) times a day, Disp: 270 tablet, Rfl: 3    isosorbide mononitrate (IMDUR) 30 mg 24 hr tablet, Take 1 tablet (30 mg total) by mouth daily, Disp: 90 tablet, Rfl: 0    Magnesium 500 MG TABS, Take 1 capsule by mouth daily , Disp: , Rfl:     metFORMIN (GLUCOPHAGE-XR) 750 mg 24 hr tablet, Take 1 tablet by mouth twice a day with meals, Disp: 180 tablet, Rfl: 3    metolazone (ZAROXOLYN) 5 mg tablet, TAKE 1 TABLET (5 MG TOTAL) BY MOUTH IF NEEDED (FLUID WEIGHT GAIN OF 4 LBS), Disp: 90 tablet, Rfl: 1    naproxen (NAPROSYN) 500 mg tablet, Take 1 tablet (500 mg total) by mouth 2 (two) times a day with meals for 7 days, Disp: 14 tablet, Rfl: 0    potassium chloride (Klor-Con M20) 20 mEq tablet, Take 2 tablets (40 mEq total) by mouth daily, Disp: 180 tablet, Rfl: 3    Semaglutide-Weight Management (WEGOVY) 0.25 MG/0.5ML, Inject 0.5 mL (0.25 mg total) under the skin once a week, Disp: 2 mL, Rfl: 0    topiramate (Topamax) 25 mg tablet,  Start with 1 tablet 20 minutes before evening meal and after 1 week can increase to 1 tablet twice a day, Disp: 60 tablet, Rfl: 3    torsemide (DEMADEX) 20 mg tablet, TAKE 2 TABLETS BY MOUTH 2 TIMES A DAY., Disp: 360 tablet, Rfl: 1    clotrimazole-betamethasone (LOTRISONE) 1-0.05 % cream, Apply topically 2 (two) times a day (Patient not taking: Reported on 8/16/2024), Disp: , Rfl:          Medication considerations/contraindications     -Patient denies personal history of pancreatitis. Patient also denies personal and family history of medullary thyroid cancer, and multiple endocrine neoplasia type 2 (MEN 2 tumor). -Patient denies any history of kidney stones, seizures, or glaucoma, diabetic retinopathy, gall bladder disease, gastroparesis, hyperthyroidism.  -Denies Hx of CAD, PAD, palpitations, arrhythmia, uncontrolled hypertension  -Denies uncontrolled anxiety or depression, suicidal behavior or thinking , insomnia or sleep disturbance.         Labs     Most recent labs reviewed   Lab Results   Component Value Date     06/26/2015    SODIUM 140 08/31/2024    K 3.9 08/31/2024     08/31/2024    CO2 30 08/31/2024    ANIONGAP 8 06/26/2015    AGAP 6 08/31/2024    BUN 26 (H) 08/31/2024    CREATININE 1.06 08/31/2024    GLUC 130 (H) 08/31/2024    GLUF 127 (H) 08/31/2024    CALCIUM 9.3 08/31/2024    AST 37 08/31/2024    AST 46 (H) 08/31/2024    ALT 48 08/31/2024    ALT 58 (H) 08/31/2024    ALKPHOS 65 08/31/2024    PROT 7.9 06/26/2015    TP 7.7 08/31/2024    BILITOT 0.5 06/26/2015    TBILI 0.58 08/31/2024    EGFR 76 08/31/2024     Lab Results   Component Value Date    HGBA1C 5.2 03/04/2023     Lab Results   Component Value Date    ZNI1CWBOKCBK 1.832 12/04/2021     Lab Results   Component Value Date    CHOLESTEROL 140 08/31/2024     Lab Results   Component Value Date    HDL 39 (L) 02/24/2024     Lab Results   Component Value Date    TRIG 182 (H) 08/31/2024     Lab Results   Component Value Date    LDLCALC 60  "02/24/2024     No results found for: \"VITD\"  No components found for: \"FASTINS\"   "

## 2024-11-26 ENCOUNTER — OFFICE VISIT (OUTPATIENT)
Dept: BARIATRICS | Facility: CLINIC | Age: 59
End: 2024-11-26
Payer: MEDICARE

## 2024-11-26 VITALS
DIASTOLIC BLOOD PRESSURE: 82 MMHG | HEART RATE: 86 BPM | OXYGEN SATURATION: 96 % | SYSTOLIC BLOOD PRESSURE: 146 MMHG | WEIGHT: 315 LBS | HEIGHT: 63 IN | RESPIRATION RATE: 18 BRPM | BODY MASS INDEX: 55.81 KG/M2

## 2024-11-26 DIAGNOSIS — E66.01 CLASS 3 SEVERE OBESITY DUE TO EXCESS CALORIES WITH SERIOUS COMORBIDITY AND BODY MASS INDEX (BMI) GREATER THAN OR EQUAL TO 70 IN ADULT (HCC): Primary | ICD-10-CM

## 2024-11-26 DIAGNOSIS — E66.813 CLASS 3 SEVERE OBESITY DUE TO EXCESS CALORIES WITH SERIOUS COMORBIDITY AND BODY MASS INDEX (BMI) GREATER THAN OR EQUAL TO 70 IN ADULT (HCC): Primary | ICD-10-CM

## 2024-11-26 PROCEDURE — 99215 OFFICE O/P EST HI 40 MIN: CPT | Performed by: INTERNAL MEDICINE

## 2024-11-26 PROCEDURE — G2211 COMPLEX E/M VISIT ADD ON: HCPCS | Performed by: INTERNAL MEDICINE

## 2024-11-26 RX ORDER — METFORMIN HYDROCHLORIDE 750 MG/1
TABLET, EXTENDED RELEASE ORAL
Qty: 180 TABLET | Refills: 3 | Status: SHIPPED | OUTPATIENT
Start: 2024-11-26

## 2024-11-26 RX ORDER — TOPIRAMATE 25 MG/1
TABLET, FILM COATED ORAL
Qty: 60 TABLET | Refills: 3 | Status: SHIPPED | OUTPATIENT
Start: 2024-11-26

## 2024-11-26 NOTE — ASSESSMENT & PLAN NOTE
Patient reports that he cut down his portion sizes to a quarter of what he was eating before and he was able to lose about 8 pounds.  When it slowed down he became discouraged.  I encouraged him to continue focusing on portion sizes and making healthier choices.  He tried the resistance bands as well and stopped.  I discussed continuing to have structure in his day with physical activity and not link it to results but just for mental and emotional health.  KYRIE on BiPAP.  He will need to have his settings reevaluated by sleep medicine.    Given class III severe obesity with BMI of 72.88, explained to the patient limitations of available medical options.  Patient would like to attempt injectable medication so prescription for Wegovy has been submitted.  Patient still continues to have uncontrolled hypertension so phentermine would not be an option.  He was started on metformin last office visit and he reports that he does not retain as much fluid in his legs after starting the medication.  Due to limited oral medication options we decided to titrate up metformin to 750 mg twice a day.  In addition we will add low-dose Topamax.    Patient was once again presented with the option of bariatric surgery.  He states that he is worried about taking his heart pills while on a liquid diet.  He reports he will feel sick to his stomach and his hands start shaking.  He is not open to this option.

## 2024-11-30 DIAGNOSIS — E78.5 HYPERLIPIDEMIA LDL GOAL <70: ICD-10-CM

## 2024-12-02 RX ORDER — ATORVASTATIN CALCIUM 20 MG/1
20 TABLET, FILM COATED ORAL DAILY
Qty: 90 TABLET | Refills: 1 | Status: SHIPPED | OUTPATIENT
Start: 2024-12-02

## 2024-12-05 ENCOUNTER — TELEPHONE (OUTPATIENT)
Dept: BARIATRICS | Facility: CLINIC | Age: 59
End: 2024-12-05

## 2025-01-12 DIAGNOSIS — I42.8 NICM (NONISCHEMIC CARDIOMYOPATHY) (HCC): ICD-10-CM

## 2025-01-12 DIAGNOSIS — I50.22 CHRONIC SYSTOLIC CHF (CONGESTIVE HEART FAILURE), NYHA CLASS 2 (HCC): ICD-10-CM

## 2025-01-14 RX ORDER — TORSEMIDE 20 MG/1
40 TABLET ORAL 2 TIMES DAILY
Qty: 360 TABLET | Refills: 1 | Status: SHIPPED | OUTPATIENT
Start: 2025-01-14

## 2025-02-11 NOTE — PROGRESS NOTES
Heart Failure Outpatient Progress Note - Dante Keith 59 y.o. male MRN: [id number]    @ Encounter: [id number]      Assessment/Plan:    Patient Active Problem List    Diagnosis Date Noted    KYRIE (obstructive sleep apnea) 07/24/2023    Acute on chronic combined systolic and diastolic congestive heart failure (HCC) 06/29/2017    Essential hypertension 06/29/2017    Class 3 severe obesity due to excess calories with serious comorbidity and body mass index (BMI) greater than or equal to 70 in adult ([facility]) 06/29/2017    VIRA (acute kidney injury) ([facility]) 06/29/2017    Lesion of pancreas 08/23/2024    Non-sustained ventricular tachycardia ([facility]) 11/20/2023       Assessment/Plan:  # Chronic systolic Heart failure, Stage C, NYHA 2  Etiology: NICM- obesity, HTN     Weight: 378 lbs--> 401 lbs--> 416 lbs--> 399 lbs  NT proBNP: 3/7/20: 348     Studies- personally reviewed by me  Echo 1/11/23:  LVEF: 60%  RV: mildly dilated, normal function     Echo 6/13/19:  LVEF: 50%, grade 2 DD  LVIDd: 6.2 cm  RV: normal  MR: mild     Echocardiogram 11/16/17  LVEF: 35%  LVIDd: 6.5  RV: dilated     Echo June 2017  LVEF: 35%     Neurohormonal Blockade:  --Beta-Blocker: coreg 12.5 mg BID  --ACEi, ARB or ARNi: hydralazine 50 mg TID, Imdur 30 mg daily    (or SVR reduction)  --Aldosterone Receptor Blocker: gynecomastia from spironolactone and inspra so off of them now    Volume management  --Diuretic: torsemide 80 mg daily, K 20 meq daily with metolazone prn     Sudden Cardiac Death Risk Reduction:  --ICD: EF now around 50%     Electrical Resynchronization:  --narrow QRS     Advanced Therapies (If appropriate):  --Inotrope:  --LVAD/Transplant Candidacy:     # Morbid obesity- BMI 68, recommend gastric bypass eval  # hyperlipidemia - atorvastatin 20 mg   3/4/23: LDL 48, HDL 36, Tri 165  7/2/22: LDL 59, HDL 33, Tri 165- post statin  12/4/21: Tri 177, HDL 35,  - pre statin  9/25: , HDL 32  # HTN- not controlled and accelerated, but in  pain from plantar fascitis  # KYRIE- BiPAP  # BUTLER fibrosis  # CKD, Cr 1.1 on 8/31/24     TODAY'S PLAN:  hydralazine to 50 mg TID  Continue torsemide 80 mg daily for HF  Would consider SGLT2i, relatively recent UTI and higher risk for UTI  LDL much better from 142 to 48 with statin  Continue BiPap for KYRIE  Unfortunately limited mobility due to right knee pain- arthritis  --2g sodium diet  - Daily weights     HPI:   60 yo male presents for follow up of systolic heart failure. He follows with Dr. Arriola for BiV heart failure, obesity and KYRIE. He states he did not have medical insurance for 2.5 years and then got Medicare, went to see his family doctor who sent him to ER due to his volume overload. He had not been on medicines. He is down 24 lbs since his admission. He reports he had a cardiac cath back around 2010 which was negative.   He was discharged from the hospital on coreg 6.25 mg BID, hydralazine and imdur. His last Cr was 1.39. He was discharged on lasix 40 mg BID. HE is low sodium in his diet. He had lost 12 more lbs since his discharge. He is getting cramping.     On my last follow up his weight was up more as drinking too much so I educated about amount to drink.   Also discussed gastric bypass as BMI was 67.   Also continued to recommend ICD for primary prevention.      Interval History:  BP was up, increased hydralazine to 50 mg TID  Saw bariatrics, submitted Wegovy- denied  Will likely try Zepbound  Review of Systems   Constitutional:  Negative for activity change, appetite change, fatigue and unexpected weight change (wt up 24 lbs).   HENT:  Negative for congestion and nosebleeds.    Eyes: Negative.    Respiratory:  Negative for cough, chest tightness and shortness of breath.    Cardiovascular:  Negative for chest pain, palpitations and leg swelling.   Gastrointestinal:  Negative for abdominal distention.   Endocrine: Negative.    Genitourinary: Negative.    Musculoskeletal: Negative.    Skin: Negative.     Neurological:  Negative for dizziness, syncope and weakness.   Hematological: Negative.    Psychiatric/Behavioral: Negative.         Past Medical History:   Diagnosis Date    Arthritis     CHF (congestive heart failure) (HCC)     Hx of cardiac cath     Hypertension     KYRIE treated with BiPAP          Allergies   Allergen Reactions    Penicillins      .    Current Outpatient Medications:     aspirin 81 mg chewable tablet, Chew 1 tablet daily, Disp: , Rfl: 0    atorvastatin (LIPITOR) 20 mg tablet, TAKE 1 TABLET BY MOUTH EVERY DAY, Disp: 90 tablet, Rfl: 1    carvedilol (COREG) 12.5 mg tablet, TAKE 1 TABLET (12.5 MG TOTAL) BY MOUTH 2 (TWO) TIMES A DAY, Disp: 180 tablet, Rfl: 3    clotrimazole-betamethasone (LOTRISONE) 1-0.05 % cream, Apply topically 2 (two) times a day (Patient not taking: Reported on 8/16/2024), Disp: , Rfl:     diclofenac (VOLTAREN) 75 mg EC tablet, prn, Disp: , Rfl: 1    hydrALAZINE (APRESOLINE) 50 mg tablet, Take 1 tablet (50 mg total) by mouth 3 (three) times a day, Disp: 270 tablet, Rfl: 3    isosorbide mononitrate (IMDUR) 30 mg 24 hr tablet, Take 1 tablet (30 mg total) by mouth daily, Disp: 90 tablet, Rfl: 0    Magnesium 500 MG TABS, Take 1 capsule by mouth daily , Disp: , Rfl:     metFORMIN (GLUCOPHAGE-XR) 750 mg 24 hr tablet, Take 1 tablet by mouth twice a day with meals, Disp: 180 tablet, Rfl: 3    metolazone (ZAROXOLYN) 5 mg tablet, TAKE 1 TABLET (5 MG TOTAL) BY MOUTH IF NEEDED (FLUID WEIGHT GAIN OF 4 LBS), Disp: 90 tablet, Rfl: 1    naproxen (NAPROSYN) 500 mg tablet, Take 1 tablet (500 mg total) by mouth 2 (two) times a day with meals for 7 days, Disp: 14 tablet, Rfl: 0    potassium chloride (Klor-Con M20) 20 mEq tablet, Take 2 tablets (40 mEq total) by mouth daily, Disp: 180 tablet, Rfl: 3    Semaglutide-Weight Management (WEGOVY) 0.25 MG/0.5ML, Inject 0.5 mL (0.25 mg total) under the skin once a week, Disp: 2 mL, Rfl: 0    topiramate (Topamax) 25 mg tablet, Start with 1 tablet 20 minutes  before evening meal and after 1 week can increase to 1 tablet twice a day, Disp: 60 tablet, Rfl: 3    torsemide (DEMADEX) 20 mg tablet, TAKE 2 TABLETS BY MOUTH 2 TIMES A DAY., Disp: 360 tablet, Rfl: 1    Social History     Socioeconomic History    Marital status: /Civil Union     Spouse name: Not on file    Number of children: Not on file    Years of education: Not on file    Highest education level: Not on file   Occupational History    Not on file   Tobacco Use    Smoking status: Never    Smokeless tobacco: Never   Vaping Use    Vaping status: Never Used   Substance and Sexual Activity    Alcohol use: Not Currently    Drug use: No    Sexual activity: Not Currently   Other Topics Concern    Not on file   Social History Narrative    Not on file     Social Drivers of Health     Financial Resource Strain: Patient Declined (1/12/2025)    Received from [facility]    Overall Financial Resource Strain (CARDIA)     Difficulty of Paying Living Expenses: Patient declined   Food Insecurity: No Food Insecurity (1/12/2025)    Received from [facility]    Hunger Vital Sign     Worried About Running Out of Food in the Last Year: Never true     Ran Out of Food in the Last Year: Never true   Transportation Needs: No Transportation Needs (1/12/2025)    Received from [facility]    PRAPARE - Transportation     Lack of Transportation (Medical): No     Lack of Transportation (Non-Medical): No   Physical Activity: Not on file   Stress: No Stress Concern Present (11/25/2023)    [facility] from [facility], [facility]    [employer] - Occupational Stress Questionnaire     Feeling of Stress : Only a little   Social Connections: Feeling Somewhat Isolated (1/12/2025)    Received from [facility]    OASIS : Social Isolation     How often do you feel lonely or isolated from those around you?: Sometimes    Intimate Partner Violence: Not At Risk (1/12/2025)    Received from [facility]    Humiliation, Afraid, Rape, and Kick questionnaire     Fear of Current or Ex-Partner: No     Emotionally Abused: No     Physically Abused: No     Sexually Abused: No   Housing Stability: Unknown (1/12/2025)    Received from [facility]    Housing Stability Vital Sign     Unable to Pay for Housing in the Last Year: No     Number of Times Moved in the Last Year: Not on file     Homeless in the Last Year: No       No family history on file.    Physical Exam:    Vitals:     Physical Exam    Labs & Results:    Lab Results   Component Value Date    SODIUM 140 08/31/2024    K 3.9 08/31/2024     08/31/2024    CO2 30 08/31/2024    BUN 26 (H) 08/31/2024    CREATININE 1.06 08/31/2024    GLUC 130 (H) 08/31/2024    CALCIUM 9.3 08/31/2024     Lab Results   Component Value Date    WBC 9.28 08/31/2024    HGB 14.2 08/31/2024    HCT 44.1 08/31/2024    MCV 91 08/31/2024     08/31/2024     Lab Results   Component Value Date    NTBNP 348 (H) 03/07/2020      Lab Results   Component Value Date    CHOLESTEROL 140 08/31/2024    CHOLESTEROL 128 02/24/2024    CHOLESTEROL 108 03/04/2023     Lab Results   Component Value Date    HDL 39 (L) 02/24/2024    HDL 36 (L) 03/04/2023    HDL 33 (L) 07/02/2022     Lab Results   Component Value Date    TRIG 182 (H) 08/31/2024    TRIG 146 02/24/2024    TRIG 120 03/04/2023     Lab Results   Component Value Date    NONHDLC 89 02/24/2024    NONHDLC 72 03/04/2023    NONHDLC 92 07/02/2022       EKG personally reviewed by Haris Tidwell.     Counseling / Coordination of Care  Time spent today 25 minutes.  Greater than 50% of total time was spent with the patient and / or family counseling and / or coordination of care. We went over current diagnosis, most recent studies and any changes in treatment.    Thank you for the opportunity to participate in the care of this patient.    HARIS  CAITIE BERNARD.  DIRECTOR OF HEART FAILURE/ PULMONARY HYPERTENSION  MEDICAL DIRECTOR OF LVAD PROGRAM  [facility]

## 2025-02-12 ENCOUNTER — OFFICE VISIT (OUTPATIENT)
Dept: CARDIOLOGY CLINIC | Facility: CLINIC | Age: 60
End: 2025-02-12
Payer: MEDICARE

## 2025-02-12 VITALS
BODY MASS INDEX: 55.81 KG/M2 | DIASTOLIC BLOOD PRESSURE: 76 MMHG | OXYGEN SATURATION: 98 % | WEIGHT: 315 LBS | HEART RATE: 72 BPM | SYSTOLIC BLOOD PRESSURE: 154 MMHG | HEIGHT: 63 IN

## 2025-02-12 DIAGNOSIS — I10 ESSENTIAL HYPERTENSION: Chronic | ICD-10-CM

## 2025-02-12 DIAGNOSIS — I50.32 CHRONIC HEART FAILURE WITH PRESERVED EJECTION FRACTION (HFPEF) (HCC): ICD-10-CM

## 2025-02-12 DIAGNOSIS — E66.01 CLASS 3 SEVERE OBESITY DUE TO EXCESS CALORIES WITH SERIOUS COMORBIDITY AND BODY MASS INDEX (BMI) GREATER THAN OR EQUAL TO 70 IN ADULT (HCC): ICD-10-CM

## 2025-02-12 DIAGNOSIS — G47.33 OSA (OBSTRUCTIVE SLEEP APNEA): ICD-10-CM

## 2025-02-12 DIAGNOSIS — I47.29 NON-SUSTAINED VENTRICULAR TACHYCARDIA (HCC): Primary | ICD-10-CM

## 2025-02-12 DIAGNOSIS — E66.813 CLASS 3 SEVERE OBESITY DUE TO EXCESS CALORIES WITH SERIOUS COMORBIDITY AND BODY MASS INDEX (BMI) GREATER THAN OR EQUAL TO 70 IN ADULT (HCC): ICD-10-CM

## 2025-02-12 PROCEDURE — 99214 OFFICE O/P EST MOD 30 MIN: CPT | Performed by: INTERNAL MEDICINE

## 2025-02-26 ENCOUNTER — OFFICE VISIT (OUTPATIENT)
Dept: BARIATRICS | Facility: CLINIC | Age: 60
End: 2025-02-26
Payer: MEDICARE

## 2025-02-26 VITALS
SYSTOLIC BLOOD PRESSURE: 140 MMHG | HEART RATE: 86 BPM | WEIGHT: 315 LBS | HEIGHT: 63 IN | OXYGEN SATURATION: 94 % | BODY MASS INDEX: 55.81 KG/M2 | DIASTOLIC BLOOD PRESSURE: 80 MMHG

## 2025-02-26 DIAGNOSIS — G47.33 OBSTRUCTIVE SLEEP APNEA: ICD-10-CM

## 2025-02-26 DIAGNOSIS — E66.01 CLASS 3 SEVERE OBESITY DUE TO EXCESS CALORIES WITH SERIOUS COMORBIDITY AND BODY MASS INDEX (BMI) GREATER THAN OR EQUAL TO 70 IN ADULT (HCC): Primary | ICD-10-CM

## 2025-02-26 DIAGNOSIS — I50.43 ACUTE ON CHRONIC COMBINED SYSTOLIC AND DIASTOLIC CONGESTIVE HEART FAILURE (HCC): ICD-10-CM

## 2025-02-26 DIAGNOSIS — E66.813 CLASS 3 SEVERE OBESITY DUE TO EXCESS CALORIES WITH SERIOUS COMORBIDITY AND BODY MASS INDEX (BMI) GREATER THAN OR EQUAL TO 70 IN ADULT (HCC): Primary | ICD-10-CM

## 2025-02-26 PROCEDURE — G2211 COMPLEX E/M VISIT ADD ON: HCPCS | Performed by: INTERNAL MEDICINE

## 2025-02-26 PROCEDURE — 99215 OFFICE O/P EST HI 40 MIN: CPT | Performed by: INTERNAL MEDICINE

## 2025-02-26 RX ORDER — TIRZEPATIDE 2.5 MG/.5ML
2.5 INJECTION, SOLUTION SUBCUTANEOUS WEEKLY
Qty: 2 ML | Refills: 0 | Status: SHIPPED | OUTPATIENT
Start: 2025-02-26 | End: 2025-04-23

## 2025-02-26 RX ORDER — TOPIRAMATE 25 MG/1
TABLET, FILM COATED ORAL
Qty: 60 TABLET | Refills: 3 | Status: SHIPPED | OUTPATIENT
Start: 2025-02-26

## 2025-02-26 NOTE — ASSESSMENT & PLAN NOTE
Antiobesity Medications/Medical --  Intermittent diarrhea with metformin now better after he switched to the evenings  Patient was prescribed Topamax twice a day but he has only been taking it once a day.  We will increase it to twice a day  We discussed FDA indication for Zepbound for treatment of severe sleep apnea which patient has.  He uses a BiPAP  Will attempt prescription  Patient was once again presented with the option of bariatric surgery. He states that he is worried about taking his heart pills while on a liquid diet. He reports he will feel sick to his stomach and his hands start shaking. He is not open to this option.   Only medication options that are available are titrating Topamax titrating metformin and adding naltrexone orally  Would avoid phentermine given his congestive heart failure history and hypertension systolic blood pressure in the 140s to 150s  Some of his weight loss is attributable to water weight secondary to the use of Zaroxolyn for HFpEF.  He is lost a total of 16 pounds.      Nutrition:    Patient reports he is trying to stay protein focused and occasionally goes off plan  Rough calorie target will be provided [id number]  Target 30 g of protein with meals and 10 to 15 g with snacks    Physical Activity:   Encouraged incorporating chair yoga as he finds his joint pains to be very limiting even with light physical activities such as cooking    Sleep: -  Severe obstructive sleep apnea KYRIE on BiPAP-last saw sleep medicine in July 2023  Discussed indication of Zepbound for severe sleep apnea    Food Behaviors/Stress:   Struggles with motivation-discussed taking action with physical activity despite waxing waning motivation

## 2025-02-26 NOTE — PROGRESS NOTES
Program: [facility]    Assessment/Plan     Dante Keith  is a 59 y.o. male with  returns to follow up  for treatment of excess body weight and associated risk factors/co-morbidities.     Class 3 severe obesity due to excess calories with serious comorbidity and body mass index (BMI) greater than or equal to 70 in adult (HCC)  Antiobesity Medications/Medical --  Intermittent diarrhea with metformin now better after he switched to the evenings  Patient was prescribed Topamax twice a day but he has only been taking it once a day.  We will increase it to twice a day  We discussed FDA indication for Zepbound for treatment of severe sleep apnea which patient has.  He uses a BiPAP  Will attempt prescription  Patient was once again presented with the option of bariatric surgery. He states that he is worried about taking his heart pills while on a liquid diet. He reports he will feel sick to his stomach and his hands start shaking. He is not open to this option.   Only medication options that are available are titrating Topamax titrating metformin and adding naltrexone orally  Would avoid phentermine given his congestive heart failure history and hypertension systolic blood pressure in the 140s to 150s  Some of his weight loss is attributable to water weight secondary to the use of Zaroxolyn for HFpEF.  He is lost a total of 16 pounds.      Nutrition:    Patient reports he is trying to stay protein focused and occasionally goes off plan  Rough calorie target will be provided [id number]  Target 30 g of protein with meals and 10 to 15 g with snacks    Physical Activity:   Encouraged incorporating chair yoga as he finds his joint pains to be very limiting even with light physical activities such as cooking    Sleep: -  Severe obstructive sleep apnea KYRIE on BiPAP-last saw sleep medicine in July 2023  Discussed indication of Zepbound for severe sleep apnea    Food Behaviors/Stress:   Struggles with  "motivation-discussed taking action with physical activity despite waxing waning motivation            Most recent notes and records were reviewed.         Return visit:  2-3 months     Nutrition   Do not skip meals. Avoid sugary beverages. At least 64oz of water daily.    Behavioral/Stress   Food log via luiz or provided paper log (luiz options include www.IntegralReach.com, sparkpeople.com, loseit.com, calorieking.com, Excep Apps). Encouraged mindful eating    Physical Activity  Increase physical activity by 10 minutes daily. Gradually increase physical activity to a goal of 5 days per week for 30 minutes of MODERATE intensity ( ( should be able to pass the \"talk test\" but should not be able to sing.  target 150-300 minutes  PLUS 2-3 days per week of FULL BODY resistance training. Progression will be addressed at follow up visits. Encouraged planning regarding establishing physical activity routine    Sleep  Provided sleep hygiene counseling and importance of having adequate sleep duration          Dante was seen today for follow-up.    Diagnoses and all orders for this visit:    Class 3 severe obesity due to excess calories with serious comorbidity and body mass index (BMI) greater than or equal to 70 in adult (HCC)  -     tirzepatide (Zepbound) 2.5 mg/0.5 mL auto-injector; Inject 0.5 mL (2.5 mg total) under the skin once a week  -     topiramate (Topamax) 25 mg tablet; Start with 1 tablet 20 minutes before evening meal and after 1 week can increase to 1 tablet twice a day    Obstructive sleep apnea  -     tirzepatide (Zepbound) 2.5 mg/0.5 mL auto-injector; Inject 0.5 mL (2.5 mg total) under the skin once a week    Acute on chronic combined systolic and diastolic congestive heart failure (HCC)          Topiramate Instructions:  -Patient was counseled that the medication is associated with cleft palate if used during the first trimester of pregnancy and therefore was instructed to use birth control during the period " of her use of this medication.  -May reduce the effectiveness of hormonal birth control - backup method such as condoms recommended to avoid pregnancy.  -Upon discontinuation tapering dose over using every other day dosing is recommended.  - patient to maintain adequate fluid intake to minimize risk of kidney stones  Advise patient against sudden discontinuation, as this may cause increased seizure activity  Instruct female patient to use an additional form of contraception due to decreased effectiveness of estrogen-containing or progestin-only contraceptives  - report symptoms of acute myopia, sudden ocular pain, blurred vision, or decreased visual acuity.  -Advise patient to report new or worsening depression, suicidal thoughts or behavior, or unusual changes in mood or behavior    Reviewed the potential side effects of topiramate (Topamax) which may include - numbness or tingling, difficulty with word finding, metabolic acidosis, occurrence of gallstones and kidney stones, glaucoma, fatigue, worsening depression/anxiety, changes in taste, abdominal upset/heartburn, and trouble sleeping. Side effects may include anorexia, taste perversion, fatigue, paresthesia, psychomotor slowing, memory or concentration difficulties, cognitive dysfunction, mood problems, and flushing.         Total time spent reviewing chart, interviewing patient, examining patient, discussing plan, answering all questions, and documentin minutes with >50% face-to-face time with the patient.            Weight trajectory     Wt Readings from Last 20 Encounters:   25 (!) 179 kg (395 lb 3.2 oz)   25 (!) 181 kg (399 lb)   24 (!) 183 kg (403 lb 6.4 oz)   24 (!) 184 kg (404 lb 9.6 oz)   10/01/24 (!) 183 kg (403 lb 6.4 oz)   24 (!) 183 kg (404 lb)   24 (!) 184 kg (404 lb 9.6 oz)   24 (!) 187 kg (411 lb 6.4 oz)   24 (!) 186 kg (410 lb 6.4 oz)   24 (!) 189 kg (416 lb)   24 (!) 183 kg  (403 lb)   11/20/23 (!) 182 kg (401 lb)   07/24/23 (!) 173 kg (381 lb 6.4 oz)   05/08/23 (!) 171 kg (378 lb)   01/11/23 (!) 176 kg (388 lb)   10/13/22 (!) 176 kg (388 lb 11.2 oz)   06/29/22 (!) 182 kg (401 lb 9.6 oz)   03/07/22 (!) 182 kg (402 lb)   06/29/21 (!) 182 kg (402 lb)   05/19/21 (!) 182 kg (402 lb 1.6 oz)               Lifestyle questionnaire       Diet recall:  B: Leftovers  S: cottage cheese doubles lunch meat   L: skips   S: 2 pm ravioli lunch meat   D: P/V/S  S: Once in a while chips and cookies but not very often  Eating out vs cooking at home- once a week    Beverages  Water-- zero     Caffeine/tea--   diet green tea, 16 oz x 4  SSB- diet coke 24 oz    Alcohol: no  Smoking: no  Drug use: no    Physical Activity --arthritis    Sleep -- STOP- BANG- ;Bi PAP same machine 9 years ago hours nap a lot during the     Occupation-disabled since age 48     Psycho social- with wife            Start date: 8/16/2024  Intial weight : 411 lbs  Intial BMI: 72.88 kg/m2  Obesity Class: Above 40- Obesity Class III  Goal weight: 280  lbs    Weight on 11/26/24 : 403 lbs  BMI on 11/26/24: 71.46 kg/m2 Above 40- Obesity Class III  Difference: -8 lbs    Weight on 2/26/2025 :(!) 179 kg (395 lb 3.2 oz) (-8)  BMI on  2/26/2025 : Body mass index is 70.01 kg/m². Above 40- Obesity Class III  Difference: -16 lbs      Anti obesity Medications/ Medical---    Weight loss medication and dose: topomax  Date initiated: Nov 2024         Colonoscopy: Needs an order            Anti obesity Medications/ Medical---    Weight loss medication and dose: Metformin  Date initiated: August 2024              Objective         The following portions of the patient's history were reviewed and updated as appropriate: allergies, current medications, past family history, past medical history, past social history, past surgical history, and problem list.      /80 (BP Location: Right arm, Patient Position: Sitting, Cuff Size: Large)   Pulse 86   Ht  "5' 3\" (1.6 m)   Wt (!) 179 kg (395 lb 3.2 oz)   SpO2 94%   BMI 70.01 kg/m²             Review of Systems   Constitutional:  Negative for fatigue.   HENT:  Negative for sore throat.    Respiratory:  Negative for cough and shortness of breath.    Cardiovascular:  Negative for chest pain, palpitations and leg swelling.   Gastrointestinal:  Negative for abdominal pain, constipation, diarrhea and nausea.   Genitourinary:  Negative for dysuria.   Musculoskeletal:  Negative for arthralgias and back pain.   Skin:  Negative for rash.   Neurological:  Negative for headaches.   Psychiatric/Behavioral:  Negative for dysphoric mood. The patient is not nervous/anxious.          Physical Exam  Vitals and nursing note reviewed.   Constitutional:       Appearance: Normal appearance.   HENT:      Head: Normocephalic.   Pulmonary:      Effort: Pulmonary effort is normal.   Neurological:      General: No focal deficit present.      Mental Status: She is alert and oriented to person, place, and time.   Psychiatric:         Mood and Affect: Mood normal.         Behavior: Behavior normal.         Thought Content: Thought content normal.         Judgment: Judgment normal.          Current medications       Current Outpatient Medications:     aspirin 81 mg chewable tablet, Chew 1 tablet daily, Disp: , Rfl: 0    atorvastatin (LIPITOR) 20 mg tablet, TAKE 1 TABLET BY MOUTH EVERY DAY, Disp: 90 tablet, Rfl: 1    carvedilol (COREG) 12.5 mg tablet, TAKE 1 TABLET (12.5 MG TOTAL) BY MOUTH 2 (TWO) TIMES A DAY, Disp: 180 tablet, Rfl: 3    clotrimazole-betamethasone (LOTRISONE) 1-0.05 % cream, Apply topically 2 (two) times a day, Disp: , Rfl:     diclofenac (VOLTAREN) 75 mg EC tablet, prn, Disp: , Rfl: 1    hydrALAZINE (APRESOLINE) 50 mg tablet, Take 1 tablet (50 mg total) by mouth 3 (three) times a day, Disp: 270 tablet, Rfl: 3    isosorbide mononitrate (IMDUR) 30 mg 24 hr tablet, Take 1 tablet (30 mg total) by mouth daily, Disp: 90 tablet, Rfl: " 0    Magnesium 500 MG TABS, Take 1 capsule by mouth daily , Disp: , Rfl:     metFORMIN (GLUCOPHAGE-XR) 750 mg 24 hr tablet, Take 1 tablet by mouth twice a day with meals, Disp: 180 tablet, Rfl: 3    metolazone (ZAROXOLYN) 5 mg tablet, TAKE 1 TABLET (5 MG TOTAL) BY MOUTH IF NEEDED (FLUID WEIGHT GAIN OF 4 LBS), Disp: 90 tablet, Rfl: 1    naproxen (NAPROSYN) 500 mg tablet, Take 1 tablet (500 mg total) by mouth 2 (two) times a day with meals for 7 days, Disp: 14 tablet, Rfl: 0    potassium chloride (Klor-Con M20) 20 mEq tablet, Take 2 tablets (40 mEq total) by mouth daily, Disp: 180 tablet, Rfl: 3    tirzepatide (Zepbound) 2.5 mg/0.5 mL auto-injector, Inject 0.5 mL (2.5 mg total) under the skin once a week, Disp: 2 mL, Rfl: 0    topiramate (Topamax) 25 mg tablet, Start with 1 tablet 20 minutes before evening meal and after 1 week can increase to 1 tablet twice a day, Disp: 60 tablet, Rfl: 3    torsemide (DEMADEX) 20 mg tablet, TAKE 2 TABLETS BY MOUTH 2 TIMES A DAY., Disp: 360 tablet, Rfl: 1         Medication considerations/contraindications     -Patient denies personal history of pancreatitis. Patient also denies personal and family history of medullary thyroid cancer, and multiple endocrine neoplasia type 2 (MEN 2 tumor). -Patient denies any history of kidney stones, seizures, or glaucoma, diabetic retinopathy, gall bladder disease, gastroparesis, hyperthyroidism.  -Denies Hx of CAD, PAD, palpitations, arrhythmia, uncontrolled hypertension  -Denies uncontrolled anxiety or depression, suicidal behavior or thinking , insomnia or sleep disturbance.         Labs     Most recent labs reviewed   Lab Results   Component Value Date     06/26/2015    SODIUM 140 08/31/2024    K 3.9 08/31/2024     08/31/2024    CO2 30 08/31/2024    ANIONGAP 8 06/26/2015    AGAP 6 08/31/2024    BUN 26 (H) 08/31/2024    CREATININE 1.06 08/31/2024    GLUC 130 (H) 08/31/2024    GLUF 127 (H) 08/31/2024    CALCIUM 9.3 08/31/2024    AST 37  "08/31/2024    AST 46 (H) 08/31/2024    ALT 48 08/31/2024    ALT 58 (H) 08/31/2024    ALKPHOS 65 08/31/2024    PROT 7.9 06/26/2015    TP 7.7 08/31/2024    BILITOT 0.5 06/26/2015    TBILI 0.58 08/31/2024    EGFR 76 08/31/2024     Lab Results   Component Value Date    HGBA1C 5.2 03/04/2023     Lab Results   Component Value Date    QZH1RKECEKXP 1.832 12/04/2021     Lab Results   Component Value Date    CHOLESTEROL 140 08/31/2024     Lab Results   Component Value Date    HDL 39 (L) 02/24/2024     Lab Results   Component Value Date    TRIG 182 (H) 08/31/2024     Lab Results   Component Value Date    LDLCALC 60 02/24/2024     No results found for: \"VITD\"  No components found for: \"FASTINS\"   "

## 2025-03-22 DIAGNOSIS — I42.8 NICM (NONISCHEMIC CARDIOMYOPATHY) (HCC): ICD-10-CM

## 2025-03-22 DIAGNOSIS — I50.22 CHRONIC SYSTOLIC CHF (CONGESTIVE HEART FAILURE), NYHA CLASS 2 (HCC): ICD-10-CM

## 2025-03-22 DIAGNOSIS — I50.42 CHRONIC COMBINED SYSTOLIC AND DIASTOLIC CHF, NYHA CLASS 2 (HCC): ICD-10-CM

## 2025-03-24 RX ORDER — POTASSIUM CHLORIDE 1500 MG/1
40 TABLET, EXTENDED RELEASE ORAL DAILY
Qty: 180 TABLET | Refills: 1 | Status: SHIPPED | OUTPATIENT
Start: 2025-03-24

## 2025-04-05 ENCOUNTER — HOSPITAL ENCOUNTER (EMERGENCY)
Facility: HOSPITAL | Age: 60
Discharge: HOME | End: 2025-04-05
Attending: EMERGENCY MEDICINE
Payer: MEDICARE

## 2025-04-05 ENCOUNTER — APPOINTMENT (EMERGENCY)
Dept: CT IMAGING | Facility: HOSPITAL | Age: 60
End: 2025-04-05
Payer: MEDICARE

## 2025-04-05 VITALS
TEMPERATURE: 97.6 F | DIASTOLIC BLOOD PRESSURE: 71 MMHG | HEART RATE: 89 BPM | OXYGEN SATURATION: 95 % | SYSTOLIC BLOOD PRESSURE: 139 MMHG | RESPIRATION RATE: 16 BRPM

## 2025-04-05 DIAGNOSIS — N30.91 HEMORRHAGIC CYSTITIS: Primary | ICD-10-CM

## 2025-04-05 LAB
ABO GROUP BLD: NORMAL
ALBUMIN SERPL BCG-MCNC: 4.3 G/DL (ref 3.5–5)
ALP SERPL-CCNC: 74 U/L (ref 34–104)
ALT SERPL W P-5'-P-CCNC: 32 U/L (ref 7–52)
ANION GAP SERPL CALCULATED.3IONS-SCNC: 8 MMOL/L (ref 4–13)
APTT PPP: 31 SECONDS (ref 23–34)
AST SERPL W P-5'-P-CCNC: 21 U/L (ref 13–39)
BACTERIA UR QL AUTO: ABNORMAL /HPF
BASOPHILS # BLD AUTO: 0.11 THOUSANDS/ÂΜL (ref 0–0.1)
BASOPHILS NFR BLD AUTO: 1 % (ref 0–1)
BILIRUB SERPL-MCNC: 0.58 MG/DL (ref 0.2–1)
BILIRUB UR QL STRIP: NEGATIVE
BLD GP AB SCN SERPL QL: NEGATIVE
BUN SERPL-MCNC: 30 MG/DL (ref 5–25)
CALCIUM SERPL-MCNC: 9.2 MG/DL (ref 8.4–10.2)
CHLORIDE SERPL-SCNC: 106 MMOL/L (ref 96–108)
CLARITY UR: ABNORMAL
CO2 SERPL-SCNC: 25 MMOL/L (ref 21–32)
COLOR UR: ABNORMAL
CREAT SERPL-MCNC: 1.14 MG/DL (ref 0.6–1.3)
EOSINOPHIL # BLD AUTO: 0.37 THOUSAND/ÂΜL (ref 0–0.61)
EOSINOPHIL NFR BLD AUTO: 3 % (ref 0–6)
ERYTHROCYTE [DISTWIDTH] IN BLOOD BY AUTOMATED COUNT: 14.8 % (ref 11.6–15.1)
GFR SERPL CREATININE-BSD FRML MDRD: 70 ML/MIN/1.73SQ M
GLUCOSE SERPL-MCNC: 125 MG/DL (ref 65–140)
GLUCOSE UR STRIP-MCNC: ABNORMAL MG/DL
HCT VFR BLD AUTO: 42.6 % (ref 36.5–49.3)
HGB BLD-MCNC: 14.8 G/DL (ref 12–17)
HGB UR QL STRIP.AUTO: ABNORMAL
IMM GRANULOCYTES # BLD AUTO: 0.18 THOUSAND/UL (ref 0–0.2)
IMM GRANULOCYTES NFR BLD AUTO: 2 % (ref 0–2)
INR PPP: 0.92 (ref 0.85–1.19)
KETONES UR STRIP-MCNC: NEGATIVE MG/DL
LACTATE SERPL-SCNC: 1.4 MMOL/L (ref 0.5–2)
LEUKOCYTE ESTERASE UR QL STRIP: ABNORMAL
LIPASE SERPL-CCNC: 38 U/L (ref 11–82)
LYMPHOCYTES # BLD AUTO: 1.27 THOUSANDS/ÂΜL (ref 0.6–4.47)
LYMPHOCYTES NFR BLD AUTO: 11 % (ref 14–44)
MCH RBC QN AUTO: 32.2 PG (ref 26.8–34.3)
MCHC RBC AUTO-ENTMCNC: 34.7 G/DL (ref 31.4–37.4)
MCV RBC AUTO: 93 FL (ref 82–98)
MONOCYTES # BLD AUTO: 0.87 THOUSAND/ÂΜL (ref 0.17–1.22)
MONOCYTES NFR BLD AUTO: 8 % (ref 4–12)
NEUTROPHILS # BLD AUTO: 8.72 THOUSANDS/ÂΜL (ref 1.85–7.62)
NEUTS SEG NFR BLD AUTO: 75 % (ref 43–75)
NITRITE UR QL STRIP: NEGATIVE
NON-SQ EPI CELLS URNS QL MICRO: ABNORMAL /HPF
NRBC BLD AUTO-RTO: 0 /100 WBCS
PH UR STRIP.AUTO: 7.5 [PH]
PLATELET # BLD AUTO: 246 THOUSANDS/UL (ref 149–390)
PMV BLD AUTO: 10.3 FL (ref 8.9–12.7)
POTASSIUM SERPL-SCNC: 4 MMOL/L (ref 3.5–5.3)
PROT SERPL-MCNC: 8.3 G/DL (ref 6.4–8.4)
PROT UR STRIP-MCNC: ABNORMAL MG/DL
PROTHROMBIN TIME: 13.1 SECONDS (ref 12.3–15)
RBC # BLD AUTO: 4.6 MILLION/UL (ref 3.88–5.62)
RBC #/AREA URNS AUTO: ABNORMAL /HPF
RH BLD: POSITIVE
SODIUM SERPL-SCNC: 139 MMOL/L (ref 135–147)
SP GR UR STRIP.AUTO: 1.02 (ref 1–1.03)
SPECIMEN EXPIRATION DATE: NORMAL
UROBILINOGEN UR STRIP-ACNC: <2 MG/DL
WBC # BLD AUTO: 11.52 THOUSAND/UL (ref 4.31–10.16)
WBC #/AREA URNS AUTO: ABNORMAL /HPF

## 2025-04-05 PROCEDURE — 85610 PROTHROMBIN TIME: CPT

## 2025-04-05 PROCEDURE — 83605 ASSAY OF LACTIC ACID: CPT

## 2025-04-05 PROCEDURE — 86900 BLOOD TYPING SEROLOGIC ABO: CPT

## 2025-04-05 PROCEDURE — 85730 THROMBOPLASTIN TIME PARTIAL: CPT

## 2025-04-05 PROCEDURE — 86850 RBC ANTIBODY SCREEN: CPT

## 2025-04-05 PROCEDURE — 36415 COLL VENOUS BLD VENIPUNCTURE: CPT

## 2025-04-05 PROCEDURE — 83690 ASSAY OF LIPASE: CPT

## 2025-04-05 PROCEDURE — 85025 COMPLETE CBC W/AUTO DIFF WBC: CPT

## 2025-04-05 PROCEDURE — 86901 BLOOD TYPING SEROLOGIC RH(D): CPT

## 2025-04-05 PROCEDURE — 87040 BLOOD CULTURE FOR BACTERIA: CPT

## 2025-04-05 PROCEDURE — 96367 TX/PROPH/DG ADDL SEQ IV INF: CPT

## 2025-04-05 PROCEDURE — 96365 THER/PROPH/DIAG IV INF INIT: CPT

## 2025-04-05 PROCEDURE — 80053 COMPREHEN METABOLIC PANEL: CPT

## 2025-04-05 PROCEDURE — 74178 CT ABD&PLV WO CNTR FLWD CNTR: CPT

## 2025-04-05 PROCEDURE — 87086 URINE CULTURE/COLONY COUNT: CPT

## 2025-04-05 PROCEDURE — 99284 EMERGENCY DEPT VISIT MOD MDM: CPT

## 2025-04-05 PROCEDURE — 81001 URINALYSIS AUTO W/SCOPE: CPT

## 2025-04-05 RX ORDER — ACETAMINOPHEN 10 MG/ML
1000 INJECTION, SOLUTION INTRAVENOUS ONCE
Status: COMPLETED | OUTPATIENT
Start: 2025-04-05 | End: 2025-04-05

## 2025-04-05 RX ORDER — CEFPODOXIME PROXETIL 200 MG/1
200 TABLET, FILM COATED ORAL 2 TIMES DAILY
Qty: 14 TABLET | Refills: 0 | Status: SHIPPED | OUTPATIENT
Start: 2025-04-05 | End: 2025-04-12

## 2025-04-05 RX ADMIN — ACETAMINOPHEN 1000 MG: 10 INJECTION INTRAVENOUS at 03:57

## 2025-04-05 RX ADMIN — SODIUM CHLORIDE 500 ML: 0.9 INJECTION, SOLUTION INTRAVENOUS at 03:56

## 2025-04-05 RX ADMIN — IOHEXOL 100 ML: 350 INJECTION, SOLUTION INTRAVENOUS at 04:49

## 2025-04-05 RX ADMIN — CEFTRIAXONE SODIUM 2000 MG: 10 INJECTION, POWDER, FOR SOLUTION INTRAVENOUS at 05:08

## 2025-04-05 NOTE — ED ATTENDING ATTESTATION
4/5/2025  I, Omar Wilks MD, saw and evaluated the patient. I have discussed the patient with the resident/non-physician practitioner and agree with the resident's/non-physician practitioner's findings, Plan of Care, and MDM as documented in the resident's/non-physician practitioner's note, except where noted. All available labs and Radiology studies were reviewed.  I was present for key portions of any procedure(s) performed by the resident/non-physician practitioner and I was immediately available to provide assistance.       At this point I agree with the current assessment done in the Emergency Department.  I have conducted an independent evaluation of this patient a history and physical is as follows: Patient is a 59 year old male with gross hematuria tonight with urinary frequency. No fever. No N/V. No abdominal pain. Denies anticoagulation use. No weight loss. States he had similar episode 1 year ago. Was last seen at [facility] e-visit on 3/11/25 for UTI and was given bactrim. [facility] website checked on this patient and no Rx found. NCAT. No scleral icterus. Moist mucous membranes. Lungs clear. Tachycardia without murmur. Abdomen soft and nontender. Good bowel sounds. No CVAT. No edema. No rash noted. DDx including but not limited to: UTI, hemorrhagic cystitis, coagulopathy, anemia, renal colic, pyelonephritis, tumor, urinary retention, recent instrumentation. Will check labs and CT.     ED Course         Critical Care Time  Procedures

## 2025-04-05 NOTE — DISCHARGE INSTRUCTIONS
Your symptoms should start to improve after 48 hours while on antibiotics.  Please return to ED for worsening fevers, chills, blood clots in urine, abdominal pain, inability to tolerate food by mouth.

## 2025-04-05 NOTE — ED PROVIDER NOTES
Time reflects when diagnosis was documented in both MDM as applicable and the Disposition within this note       Time User Action Codes Description Comment    4/5/2025  6:30 AM Zac Rodríguez Add [N30.91] Hemorrhagic cystitis           ED Disposition       ED Disposition   Discharge    Condition   Stable    Date/Time   Sat Apr 5, 2025  6:30 AM    Comment   Dante TAYLOR Sagar discharge to home/self care.                   Assessment & Plan       Medical Decision Making  Patient is a 59-year-old male, past medical history of hypertension, who presents to the emergency department for evaluation of dysuria, urinary frequency, hematuria ongoing since this evening.  Patient denies daily aspirin or anticoagulation use.  Reports similar episode last year for which she required IV antibiotics for suspected urinary tract infection.    Differential diagnoses include but not limited to: Urinary tract infection, hemorrhagic cystitis, kidney stone, ureteral stricture, bladder neoplasm.    Orders written for labs, urinalysis, CT Renal Protocol.    Patient treated with IV Tylenol.     See ED course for full details.    Urinalysis noted to show innumerable red blood cells, white blood cells, but no bacteria.   Patient started on IV ceftriaxone, for suspected urinary tract infection.    CT results notable for thickening of gallbladder, concerning for cystitis, which was reviewed and discussed with the patient who verbalized understanding and was given the opportunity to ask questions.    On reevaluation, patient appears well, is in no acute distress, requesting discharge at this time.  Advised the patient that I will prescribe a 7 day course of cefpodoxime, which was sent to his pharmacy.  Advised the patient on emergency signs and symptoms for which she should urgently return to the ED and encouraged continued follow-up with PCP and referred to urologist within 1 week for reevaluation of symptoms.  Advised the patient he should  start to feel improved after 48 hours of antibiotics.    Encouraged him to have a low threshold to return to the ED for worsening symptoms  Patient verbalized understanding of plan of care, given the opportunity to ask questions.    Amount and/or Complexity of Data Reviewed  Labs: ordered. Decision-making details documented in ED Course.  Radiology: ordered. Decision-making details documented in ED Course.    Risk  Prescription drug management.        ED Course as of 04/05/25 0645   Sat Apr 05, 2025   0617 CBC and differential(!)  Leukocytosis, no evidence of thrombocytopenia, anemia   0619 Lactic acid, plasma (w/reflex if result > 2.0)   0619 Comprehensive metabolic panel(!)  No evidence of electrolyte abnormality, VIRA, evidence of endorgan damage   0620 UA w Reflex to Microscopic w Reflex to Culture(!)  Large amount of leukocytes, large amount of blood   0620 Urine Microscopic(!)  Red blood cells, white blood cells but no bacteria seen.  Initially read as present budding yeast, but lab called and informed me that this is not correct and that budding yeast is NOT present.    0642 CT renal protocol    IMPRESSION:     Evaluation of the urinary bladder is limited by under distention, however, the urinary bladder wall is diffusely thickened. Correlation with urinalysis and urine culture and sensitivity is recommended. If clinically appropriate, Urology follow-up could   be obtained.     Other nonemergent findings as above.      Workstation performed: [id number]         Medications   acetaminophen (Ofirmev) injection 1,000 mg (0 mg Intravenous Stopped 4/5/25 0511)   sodium chloride 0.9 % bolus 500 mL (0 mL Intravenous Stopped 4/5/25 0545)   ceftriaxone (ROCEPHIN) 2 g/50 mL in dextrose IVPB (0 mg Intravenous Stopped 4/5/25 0616)   iohexol (OMNIPAQUE) 350 MG/ML injection (MULTI-DOSE) 100 mL (100 mL Intravenous Given 4/5/25 0449)       ED Risk Strat Scores                            SBIRT 20yo+      Flowsheet Row Most  Recent Value   Initial Alcohol Screen: US AUDIT-C     1. How often do you have a drink containing alcohol? 0 Filed at: 04/05/2025 0320   2. How many drinks containing alcohol do you have on a typical day you are drinking?  0 Filed at: 04/05/2025 0320   3a. Male UNDER 65: How often do you have five or more drinks on one occasion? 0 Filed at: 04/05/2025 0320   3b. FEMALE Any Age, or MALE 65+: How often do you have 4 or more drinks on one occassion? 0 Filed at: 04/05/2025 0320   Audit-C Score 0 Filed at: 04/05/2025 0320   SUNNY: How many times in the past year have you...    Used an illegal drug or used a prescription medication for non-medical reasons? Never Filed at: 04/05/2025 0320                            History of Present Illness       Chief Complaint   Patient presents with    Blood in Urine     Patient comes in reporting xiomara red blood in urine and passing blood clots. States he has had burning w/ urinating for past few weeks. Was started on abx and finished course approx 1 week ago. Started tonight w/ frequent urinating and bloody urine.        Past Medical History:   Diagnosis Date    Arthritis     CHF (congestive heart failure) (HCC)     Chronic kidney disease     Hx of cardiac cath     Hypertension     KYRIE treated with BiPAP       Past Surgical History:   Procedure Laterality Date    ABDOMINAL SURGERY      stomach cauterization post emesis    KNEE ARTHROSCOPY Right       History reviewed. No pertinent family history.   Social History     Tobacco Use    Smoking status: Never    Smokeless tobacco: Never   Vaping Use    Vaping status: Never Used   Substance Use Topics    Alcohol use: Not Currently    Drug use: No      E-Cigarette/Vaping    E-Cigarette Use Never User       E-Cigarette/Vaping Substances    Nicotine No     THC No     CBD No     Flavoring No     Other No     Unknown No       I have reviewed and agree with the history as documented.       Blood in Urine      Patient is a 59-year-old male, with a  history of high blood pressure, who presents to the emergency department for evaluation of urinary frequency, dysuria, hematuria beginning tonight.  Patient denies any fevers, chills, abdominal pain, nausea, vomiting.  He denies taking anticoagulation, aspirin daily patient reports a similar episode approximately 1 year ago for which he required IV antibiotics.  Patient was recently evaluated at urgent care for similar symptoms 3 weeks ago, was started on Bactrim.  Patient denies any flank pain, history of kidney stones.     Review of Systems   Genitourinary:  Positive for hematuria.   All other systems reviewed and are negative.          Objective       ED Triage Vitals   Temperature Pulse Blood Pressure Respirations SpO2 Patient Position - Orthostatic VS   04/05/25 0321 04/05/25 0319 04/05/25 0320 04/05/25 0319 04/05/25 0319 04/05/25 0319   97.6 °F (36.4 °C) 105 (!) 202/89 20 96 % Sitting      Temp Source Heart Rate Source BP Location FiO2 (%) Pain Score    04/05/25 0321 04/05/25 0319 04/05/25 0319 -- 04/05/25 0319    Oral Monitor Right arm  7      Vitals      Date and Time Temp Pulse SpO2 Resp BP Pain Score FACES Pain Rating User   04/05/25 0511 -- 89 95 % 16 139/71 -- --    04/05/25 0321 97.6 °F (36.4 °C) -- -- -- -- -- --    04/05/25 0320 -- -- -- -- 202/89 -- --    04/05/25 0319 -- 105 96 % 20 -- 7 --             Physical Exam  Vitals reviewed. Exam conducted with a chaperone present.   Constitutional:       General: He is not in acute distress.     Appearance: Normal appearance. He is not ill-appearing, toxic-appearing or diaphoretic.   HENT:      Mouth/Throat:      Mouth: Mucous membranes are moist.   Cardiovascular:      Rate and Rhythm: Normal rate and regular rhythm.      Heart sounds: Normal heart sounds. No murmur heard.     No friction rub. No gallop.   Pulmonary:      Breath sounds: Normal breath sounds. No wheezing, rhonchi or rales.   Abdominal:      Palpations: Abdomen is soft.       Tenderness: There is no abdominal tenderness.   Genitourinary:     Penis: Normal.       Testes: Normal.   Skin:     General: Skin is warm.      Capillary Refill: Capillary refill takes less than 2 seconds.   Neurological:      Mental Status: He is alert and oriented to person, place, and time.   Psychiatric:         Mood and Affect: Mood normal.         Behavior: Behavior normal.         Thought Content: Thought content normal.         Judgment: Judgment normal.         Results Reviewed       Procedure Component Value Units Date/Time    Urine Microscopic [[id number]]  (Abnormal) Collected: 04/05/25 0348    Lab Status: Edited Result - FINAL Specimen: Urine, Clean Catch Updated: 04/05/25 0432     RBC, UA Innumerable /hpf      WBC, UA Innumerable /hpf      Epithelial Cells None Seen /hpf      Bacteria, UA None Seen /hpf      Budding Yeast --    Urine culture [[id number]] Collected: 04/05/25 0348    Lab Status: In process Specimen: Urine, Clean Catch Updated: 04/05/25 0432    Comprehensive metabolic panel [[id number]]  (Abnormal) Collected: 04/05/25 0348    Lab Status: Final result Specimen: Blood from Arm, Right Updated: 04/05/25 0418     Sodium 139 mmol/L      Potassium 4.0 mmol/L      Chloride 106 mmol/L      CO2 25 mmol/L      ANION GAP 8 mmol/L      BUN 30 mg/dL      Creatinine 1.14 mg/dL      Glucose 125 mg/dL      Calcium 9.2 mg/dL      AST 21 U/L      ALT 32 U/L      Alkaline Phosphatase 74 U/L      Total Protein 8.3 g/dL      Albumin 4.3 g/dL      Total Bilirubin 0.58 mg/dL      eGFR 70 ml/min/1.73sq m     Narrative:      [employer] Kid[employer] guidelines for Chronic Kidney Disease (CKD):     Stage 1 with normal or high GFR (GFR > 90 mL/min/1.73 square meters)    Stage 2 Mild CKD (GFR = 60-89 mL/min/1.73 square meters)    Stage 3A Moderate CKD (GFR = 45-59 mL/min/1.73 square meters)    Stage 3B Moderate CKD (GFR = 30-44 mL/min/1.73 square meters)    Stage 4 Severe CKD (GFR = 15-29 mL/min/1.73 square  meters)    Stage 5 End Stage CKD (GFR <15 mL/min/1.73 square meters)  Note: GFR calculation is accurate only with a steady state creatinine    Lipase [[id number]]  (Normal) Collected: 04/05/25 0348    Lab Status: Final result Specimen: Blood from Arm, Right Updated: 04/05/25 0418     Lipase 38 u/L     Lactic acid, plasma (w/reflex if result > 2.0) [[id number]]  (Normal) Collected: 04/05/25 0348    Lab Status: Final result Specimen: Blood from Arm, Right Updated: 04/05/25 0417     LACTIC ACID 1.4 mmol/L     Narrative:      Result may be elevated if tourniquet was used during collection.    Protime-INR [[id number]]  (Normal) Collected: 04/05/25 0348    Lab Status: Final result Specimen: Blood from Arm, Right Updated: 04/05/25 0414     Protime 13.1 seconds      INR 0.92    Narrative:      INR Therapeutic Range    Indication                                             INR Range      Atrial Fibrillation                                               2.0-3.0  Hypercoagulable State                                    2.0.2.3  Left Ventricular Asist Device                            2.0-3.0  Mechanical Heart Valve                                  -    Aortic(with afib, MI, embolism, HF, LA enlargement,    and/or coagulopathy)                                     2.0-3.0 (2.5-3.5)     Mitral                                                             2.5-3.5  Prosthetic/Bioprosthetic Heart Valve               2.0-3.0  Venous thromboembolism (VTE: VT, PE        2.0-3.0    APTT [[id number]]  (Normal) Collected: 04/05/25 0348    Lab Status: Final result Specimen: Blood from Arm, Right Updated: 04/05/25 0414     PTT 31 seconds     CBC and differential [[id number]]  (Abnormal) Collected: 04/05/25 0348    Lab Status: Final result Specimen: Blood from Arm, Right Updated: 04/05/25 0410     WBC 11.52 Thousand/uL      RBC 4.60 Million/uL      Hemoglobin 14.8 g/dL      Hematocrit 42.6 %      MCV 93 fL      MCH 32.2 pg      MCHC 34.7 g/dL       RDW 14.8 %      MPV 10.3 fL      Platelets 246 Thousands/uL      nRBC 0 /100 WBCs      Segmented % 75 %      Immature Grans % 2 %      Lymphocytes % 11 %      Monocytes % 8 %      Eosinophils Relative 3 %      Basophils Relative 1 %      Absolute Neutrophils 8.72 Thousands/µL      Absolute Immature Grans 0.18 Thousand/uL      Absolute Lymphocytes 1.27 Thousands/µL      Absolute Monocytes 0.87 Thousand/µL      Eosinophils Absolute 0.37 Thousand/µL      Basophils Absolute 0.11 Thousands/µL     Blood culture #2 [[id number]] Collected: 04/05/25 0403    Lab Status: In process Specimen: Blood from Hand, Right Updated: 04/05/25 0410    UA w Reflex to Microscopic w Reflex to Culture [[id number]]  (Abnormal) Collected: 04/05/25 0348    Lab Status: Final result Specimen: Urine, Clean Catch Updated: 04/05/25 0407     Color, UA Dark Brown     Clarity, UA Extra Turbid     Specific Gravity, UA 1.020     pH, UA 7.5     Leukocytes, UA Large     Nitrite, UA Negative     Protein,  (3+) mg/dl      Glucose, UA Trace mg/dl      Ketones, UA Negative mg/dl      Urobilinogen, UA <2.0 mg/dl      Bilirubin, UA Negative     Occult Blood, UA Large    Blood culture #1 [[id number]] Collected: 04/05/25 0348    Lab Status: In process Specimen: Blood from Arm, Right Updated: 04/05/25 0356            CT renal protocol   Final Interpretation by Mayra Medel MD (04/05 0621)      Evaluation of the urinary bladder is limited by under distention, however, the urinary bladder wall is diffusely thickened. Correlation with urinalysis and urine culture and sensitivity is recommended. If clinically appropriate, Urology follow-up could    be obtained.      Other nonemergent findings as above.                  Workstation performed: [id number]             Procedures    ED Medication and Procedure Management   Prior to Admission Medications   Prescriptions Last Dose Informant Patient Reported? Taking?   Magnesium 500 MG TABS  Self Yes No   Sig: Take 1  capsule by mouth daily    aspirin 81 mg chewable tablet  Self No No   Sig: Chew 1 tablet daily   atorvastatin (LIPITOR) 20 mg tablet   No No   Sig: TAKE 1 TABLET BY MOUTH EVERY DAY   carvedilol (COREG) 12.5 mg tablet  Self No No   Sig: TAKE 1 TABLET (12.5 MG TOTAL) BY MOUTH 2 (TWO) TIMES A DAY   clotrimazole-betamethasone (LOTRISONE) 1-0.05 % cream  Self Yes No   Sig: Apply topically 2 (two) times a day   diclofenac (VOLTAREN) 75 mg EC tablet  Self Yes No   Sig: prn   hydrALAZINE (APRESOLINE) 50 mg tablet  Self No No   Sig: Take 1 tablet (50 mg total) by mouth 3 (three) times a day   isosorbide mononitrate (IMDUR) 30 mg 24 hr tablet  Self No No   Sig: Take 1 tablet (30 mg total) by mouth daily   metFORMIN (GLUCOPHAGE-XR) 750 mg 24 hr tablet   No No   Sig: Take 1 tablet by mouth twice a day with meals   metolazone (ZAROXOLYN) 5 mg tablet  Self No No   Sig: TAKE 1 TABLET (5 MG TOTAL) BY MOUTH IF NEEDED (FLUID WEIGHT GAIN OF 4 LBS)   naproxen (NAPROSYN) 500 mg tablet  Self No No   Sig: Take 1 tablet (500 mg total) by mouth 2 (two) times a day with meals for 7 days   potassium chloride (Klor-Con M20) 20 mEq tablet   No No   Sig: TAKE 2 TABLETS BY MOUTH DAILY   tirzepatide (Zepbound) 2.5 mg/0.5 mL auto-injector   No No   Sig: Inject 0.5 mL (2.5 mg total) under the skin once a week   topiramate (Topamax) 25 mg tablet   No No   Sig: Start with 1 tablet 20 minutes before evening meal and after 1 week can increase to 1 tablet twice a day   torsemide (DEMADEX) 20 mg tablet   No No   Sig: TAKE 2 TABLETS BY MOUTH 2 TIMES A DAY.      Facility-Administered Medications: None     Patient's Medications   Discharge Prescriptions    CEFPODOXIME (VANTIN) 200 MG TABLET    Take 1 tablet (200 mg total) by mouth 2 (two) times a day for 7 days       Start Date: 4/5/2025  End Date: 4/12/2025       Order Dose: 200 mg       Quantity: 14 tablet    Refills: 0       ED SEPSIS DOCUMENTATION   Time reflects when diagnosis was documented in both MDM  as applicable and the Disposition within this note       Time User Action Codes Description Comment    4/5/2025  6:30 AM Zac Rodríguez Add [N30.91] Hemorrhagic cystitis                  Zac BANG DO  04/05/25 0645

## 2025-04-06 ENCOUNTER — RESULTS FOLLOW-UP (OUTPATIENT)
Dept: EMERGENCY DEPT | Facility: HOSPITAL | Age: 60
End: 2025-04-06

## 2025-04-06 LAB — BACTERIA UR CULT: ABNORMAL

## 2025-04-10 LAB
BACTERIA BLD CULT: NORMAL
BACTERIA BLD CULT: NORMAL

## 2025-04-29 DIAGNOSIS — I50.22 CHRONIC SYSTOLIC CHF (CONGESTIVE HEART FAILURE), NYHA CLASS 2 (HCC): ICD-10-CM

## 2025-05-01 RX ORDER — ISOSORBIDE MONONITRATE 30 MG/1
30 TABLET, EXTENDED RELEASE ORAL DAILY
Qty: 90 TABLET | Refills: 2 | Status: SHIPPED | OUTPATIENT
Start: 2025-05-01

## 2025-07-08 DIAGNOSIS — I10 HTN (HYPERTENSION), BENIGN: ICD-10-CM

## 2025-07-09 RX ORDER — HYDRALAZINE HYDROCHLORIDE 50 MG/1
50 TABLET, FILM COATED ORAL 3 TIMES DAILY
Qty: 270 TABLET | Refills: 1 | Status: SHIPPED | OUTPATIENT
Start: 2025-07-09

## 2025-07-29 ENCOUNTER — TELEPHONE (OUTPATIENT)
Dept: BARIATRICS | Facility: CLINIC | Age: 60
End: 2025-07-29

## 2025-08-02 ENCOUNTER — APPOINTMENT (OUTPATIENT)
Dept: LAB | Facility: CLINIC | Age: 60
End: 2025-08-02
Payer: MEDICARE

## 2025-08-02 DIAGNOSIS — K76.0 HEPATIC STEATOSIS: ICD-10-CM

## 2025-08-02 DIAGNOSIS — R16.0 HEPATOMEGALY: ICD-10-CM

## 2025-08-02 LAB
ALBUMIN SERPL BCG-MCNC: 3.8 G/DL (ref 3.5–5)
ALP SERPL-CCNC: 61 U/L (ref 34–104)
ALT SERPL W P-5'-P-CCNC: 40 U/L (ref 7–52)
ANION GAP SERPL CALCULATED.3IONS-SCNC: 7 MMOL/L (ref 4–13)
AST SERPL W P-5'-P-CCNC: 23 U/L (ref 13–39)
BASOPHILS # BLD AUTO: 0.09 THOUSANDS/ÂΜL (ref 0–0.1)
BASOPHILS NFR BLD AUTO: 1 % (ref 0–1)
BILIRUB SERPL-MCNC: 0.8 MG/DL (ref 0.2–1)
BUN SERPL-MCNC: 27 MG/DL (ref 5–25)
CALCIUM SERPL-MCNC: 9.1 MG/DL (ref 8.4–10.2)
CHLORIDE SERPL-SCNC: 103 MMOL/L (ref 96–108)
CO2 SERPL-SCNC: 29 MMOL/L (ref 21–32)
CREAT SERPL-MCNC: 1.1 MG/DL (ref 0.6–1.3)
EOSINOPHIL # BLD AUTO: 0.46 THOUSAND/ÂΜL (ref 0–0.61)
EOSINOPHIL NFR BLD AUTO: 5 % (ref 0–6)
ERYTHROCYTE [DISTWIDTH] IN BLOOD BY AUTOMATED COUNT: 14.1 % (ref 11.6–15.1)
GFR SERPL CREATININE-BSD FRML MDRD: 73 ML/MIN/1.73SQ M
GLUCOSE P FAST SERPL-MCNC: 115 MG/DL (ref 65–99)
HCT VFR BLD AUTO: 42.6 % (ref 36.5–49.3)
HGB BLD-MCNC: 14.5 G/DL (ref 12–17)
IMM GRANULOCYTES # BLD AUTO: 0.11 THOUSAND/UL (ref 0–0.2)
IMM GRANULOCYTES NFR BLD AUTO: 1 % (ref 0–2)
INR PPP: 0.92 (ref 0.85–1.19)
LYMPHOCYTES # BLD AUTO: 1.71 THOUSANDS/ÂΜL (ref 0.6–4.47)
LYMPHOCYTES NFR BLD AUTO: 19 % (ref 14–44)
MCH RBC QN AUTO: 31.1 PG (ref 26.8–34.3)
MCHC RBC AUTO-ENTMCNC: 34 G/DL (ref 31.4–37.4)
MCV RBC AUTO: 91 FL (ref 82–98)
MONOCYTES # BLD AUTO: 0.79 THOUSAND/ÂΜL (ref 0.17–1.22)
MONOCYTES NFR BLD AUTO: 9 % (ref 4–12)
NEUTROPHILS # BLD AUTO: 5.86 THOUSANDS/ÂΜL (ref 1.85–7.62)
NEUTS SEG NFR BLD AUTO: 65 % (ref 43–75)
NRBC BLD AUTO-RTO: 0 /100 WBCS
PLATELET # BLD AUTO: 214 THOUSANDS/UL (ref 149–390)
PMV BLD AUTO: 10.8 FL (ref 8.9–12.7)
POTASSIUM SERPL-SCNC: 3.6 MMOL/L (ref 3.5–5.3)
PROT SERPL-MCNC: 7.4 G/DL (ref 6.4–8.4)
PROTHROMBIN TIME: 13.1 SECONDS (ref 12.3–15)
RBC # BLD AUTO: 4.66 MILLION/UL (ref 3.88–5.62)
SODIUM SERPL-SCNC: 139 MMOL/L (ref 135–147)
WBC # BLD AUTO: 9.02 THOUSAND/UL (ref 4.31–10.16)

## 2025-08-02 PROCEDURE — 85025 COMPLETE CBC W/AUTO DIFF WBC: CPT

## 2025-08-02 PROCEDURE — 84460 ALANINE AMINO (ALT) (SGPT): CPT

## 2025-08-02 PROCEDURE — 84478 ASSAY OF TRIGLYCERIDES: CPT

## 2025-08-02 PROCEDURE — 84450 TRANSFERASE (AST) (SGOT): CPT

## 2025-08-02 PROCEDURE — 83010 ASSAY OF HAPTOGLOBIN QUANT: CPT

## 2025-08-02 PROCEDURE — 82947 ASSAY GLUCOSE BLOOD QUANT: CPT

## 2025-08-02 PROCEDURE — 85610 PROTHROMBIN TIME: CPT

## 2025-08-02 PROCEDURE — 36415 COLL VENOUS BLD VENIPUNCTURE: CPT

## 2025-08-02 PROCEDURE — 82977 ASSAY OF GGT: CPT

## 2025-08-02 PROCEDURE — 80053 COMPREHEN METABOLIC PANEL: CPT

## 2025-08-02 PROCEDURE — 82465 ASSAY BLD/SERUM CHOLESTEROL: CPT

## 2025-08-02 PROCEDURE — 83883 ASSAY NEPHELOMETRY NOT SPEC: CPT

## 2025-08-02 PROCEDURE — 82172 ASSAY OF APOLIPOPROTEIN: CPT

## 2025-08-02 PROCEDURE — 82247 BILIRUBIN TOTAL: CPT

## 2025-08-04 ENCOUNTER — OFFICE VISIT (OUTPATIENT)
Dept: GASTROENTEROLOGY | Facility: AMBULARY SURGERY CENTER | Age: 60
End: 2025-08-04
Payer: MEDICARE

## 2025-08-04 VITALS
BODY MASS INDEX: 55.81 KG/M2 | WEIGHT: 315 LBS | OXYGEN SATURATION: 95 % | HEART RATE: 73 BPM | HEIGHT: 63 IN | SYSTOLIC BLOOD PRESSURE: 128 MMHG | DIASTOLIC BLOOD PRESSURE: 80 MMHG

## 2025-08-04 DIAGNOSIS — R16.0 HEPATOMEGALY: ICD-10-CM

## 2025-08-04 DIAGNOSIS — K76.0 METABOLIC DYSFUNCTION-ASSOCIATED STEATOTIC LIVER DISEASE (MASLD): Primary | ICD-10-CM

## 2025-08-04 DIAGNOSIS — Z12.11 SCREENING FOR COLON CANCER: ICD-10-CM

## 2025-08-04 LAB
A2 MACROGLOB SERPL-MCNC: 238 MG/DL (ref 110–276)
ALT SERPL W P-5'-P-CCNC: 47 IU/L (ref 0–55)
APO A-I SERPL-MCNC: 126 MG/DL (ref 101–178)
AST SERPL W P-5'-P-CCNC: 28 IU/L (ref 0–40)
BILIRUB SERPL-MCNC: 0.4 MG/DL (ref 0–1.2)
CHOLEST SERPL-MCNC: 129 MG/DL (ref 100–199)
FIBROSIS SCORING:: ABNORMAL
FIBROSIS STAGE SERPL QL: ABNORMAL
GGT SERPL-CCNC: 35 IU/L (ref 0–65)
GLUCOSE SERPL-MCNC: 112 MG/DL (ref 70–99)
HAPTOGLOB SERPL-MCNC: 211 MG/DL (ref 29–370)
INTERPRETATION: ABNORMAL
LABORATORY COMMENT REPORT: ABNORMAL
LIVER FIBR SCORE SERPL CALC.FIBROSURE: 0.31 (ref 0–0.21)
NASH GRADE SERPL QL: ABNORMAL
NASH SCORE SERPL: 0.66 (ref 0–0.25)
REF LAB TEST METHOD: ABNORMAL
SL AMB NASH SCORING: ABNORMAL
SL AMB STEATOSIS GRADE: ABNORMAL
SL AMB STEATOSIS SCORE: 0.57 (ref 0–0.4)
STEATOSIS SCORING: ABNORMAL
TEST PERFORMANCE INFO SPEC: ABNORMAL
TRIGL SERPL-MCNC: 144 MG/DL (ref 0–149)

## 2025-08-04 PROCEDURE — 99213 OFFICE O/P EST LOW 20 MIN: CPT | Performed by: FAMILY MEDICINE

## 2025-08-18 PROBLEM — I50.32 CHRONIC HEART FAILURE WITH PRESERVED EJECTION FRACTION (HFPEF) (HCC): Status: ACTIVE | Noted: 2025-08-18

## 2025-08-19 ENCOUNTER — OFFICE VISIT (OUTPATIENT)
Dept: CARDIOLOGY CLINIC | Facility: CLINIC | Age: 60
End: 2025-08-19
Payer: MEDICARE

## 2025-08-19 VITALS
SYSTOLIC BLOOD PRESSURE: 130 MMHG | WEIGHT: 315 LBS | BODY MASS INDEX: 55.81 KG/M2 | DIASTOLIC BLOOD PRESSURE: 84 MMHG | HEART RATE: 81 BPM | OXYGEN SATURATION: 97 % | HEIGHT: 63 IN

## 2025-08-19 DIAGNOSIS — I10 ESSENTIAL HYPERTENSION: Chronic | ICD-10-CM

## 2025-08-19 DIAGNOSIS — I47.29 NON-SUSTAINED VENTRICULAR TACHYCARDIA (HCC): ICD-10-CM

## 2025-08-19 DIAGNOSIS — G47.33 OSA (OBSTRUCTIVE SLEEP APNEA): Primary | ICD-10-CM

## 2025-08-19 PROCEDURE — 99214 OFFICE O/P EST MOD 30 MIN: CPT | Performed by: INTERNAL MEDICINE
